# Patient Record
Sex: FEMALE | Race: WHITE | NOT HISPANIC OR LATINO | URBAN - NONMETROPOLITAN AREA
[De-identification: names, ages, dates, MRNs, and addresses within clinical notes are randomized per-mention and may not be internally consistent; named-entity substitution may affect disease eponyms.]

---

## 2018-03-12 ENCOUNTER — HISTORICAL (OUTPATIENT)
Dept: ADMINISTRATIVE | Facility: HOSPITAL | Age: 58
End: 2018-03-12

## 2021-04-08 LAB — CRC RECOMMENDATION EXT: NORMAL

## 2021-04-28 LAB
ALBUMIN SERPL-MCNC: 3.8 G/DL (ref 3.4–5)
ALBUMIN/GLOB SERPL: 1.5 {RATIO}
ALP SERPL-CCNC: 90 U/L (ref 50–144)
ALT SERPL-CCNC: 17 U/L (ref 1–45)
ANION GAP SERPL CALC-SCNC: 9 MMOL/L (ref 7–16)
AST SERPL-CCNC: 18 U/L (ref 14–36)
BASOPHILS # BLD AUTO: 0.04 X10(3)/MCL (ref 0.01–0.08)
BASOPHILS NFR BLD AUTO: 0.6 % (ref 0.1–1.2)
BILIRUB SERPL-MCNC: 0.55 MG/DL (ref 0.1–1)
BUN SERPL-MCNC: 9 MG/DL (ref 7–20)
CALCIUM SERPL-MCNC: 8.9 MG/DL (ref 8.4–10.2)
CHLORIDE SERPL-SCNC: 108 MMOL/L (ref 94–110)
CHOLEST SERPL-MCNC: 194 MG/DL (ref 0–200)
CO2 SERPL-SCNC: 25 MMOL/L (ref 21–32)
CREAT SERPL-MCNC: 0.7 MG/DL (ref 0.52–1.04)
CREAT UR-MCNC: 17.4 MG/DL
CREAT/UREA NIT SERPL: 12.9 (ref 12–20)
EOSINOPHIL # BLD AUTO: 0.18 X10(3)/MCL (ref 0.04–0.36)
EOSINOPHIL NFR BLD AUTO: 2.5 % (ref 0.7–7)
ERYTHROCYTE [DISTWIDTH] IN BLOOD BY AUTOMATED COUNT: 12.7 % (ref 11–14.5)
EST. AVERAGE GLUCOSE BLD GHB EST-MCNC: 138 MG/DL (ref 70–115)
GLOBULIN SER-MCNC: 2.5 G/DL (ref 2–3.9)
GLUCOSE SERPL-MCNC: 124 MGM./DL (ref 70–115)
HBA1C MFR BLD: 6.6 % (ref 4–6)
HCT VFR BLD AUTO: 42.3 % (ref 36–48)
HDLC SERPL-MCNC: 46 MG/DL (ref 40–60)
HGB BLD-MCNC: 13.7 G/DL (ref 11.8–16)
IMM GRANULOCYTES # BLD AUTO: 0.01 X10E3/UL (ref 0–0.03)
IMM GRANULOCYTES NFR BLD AUTO: 0.1 % (ref 0–0.5)
LDLC SERPL CALC-MCNC: 124.1 MG/DL (ref 30–100)
LYMPHOCYTES # BLD AUTO: 1.89 X10(3)/MCL (ref 1.16–3.74)
LYMPHOCYTES NFR BLD AUTO: 26.1 % (ref 20–55)
MCH RBC QN AUTO: 29.4 PG (ref 27–34)
MCHC RBC AUTO-ENTMCNC: 32.4 G/DL (ref 31–37)
MCV RBC AUTO: 90.8 FL (ref 79–99)
MICROALBUMIN/CREAT RATIO PNL UR: <69 (ref 0–29)
MONOCYTES # BLD AUTO: 0.65 X10(3)/MCL (ref 0.24–0.36)
MONOCYTES NFR BLD AUTO: 9 % (ref 4.7–12.5)
NEUTROPHILS # BLD AUTO: 4.47 X10(3)/MCL (ref 1.56–6.13)
NEUTROPHILS NFR BLD AUTO: 61.7 % (ref 37–73)
PLATELET # BLD AUTO: 303 X10(3)/MCL (ref 140–371)
PMV BLD AUTO: 11.3 FL (ref 9.4–12.4)
POTASSIUM SERPL-SCNC: 4.1 MMOL/L (ref 3.5–5.1)
PROT SERPL-MCNC: 6.3 G/DL (ref 6.3–8.2)
RBC # BLD AUTO: 4.66 X10(6)/MCL (ref 4–5.1)
SODIUM SERPL-SCNC: 142 MMOL/L (ref 135–145)
TRIGL SERPL-MCNC: 113 MG/DL (ref 30–200)
TSH SERPL-ACNC: 1.77 UIU/ML (ref 0.36–3.74)
WBC # SPEC AUTO: 7.2 X10(3)/MCL (ref 4–11.5)

## 2021-05-06 ENCOUNTER — HISTORICAL (OUTPATIENT)
Dept: ADMINISTRATIVE | Facility: HOSPITAL | Age: 61
End: 2021-05-06

## 2021-05-06 LAB — BCS RECOMMENDATION EXT: NORMAL

## 2021-08-27 LAB
ALBUMIN SERPL-MCNC: 3.9 G/DL (ref 3.4–5)
ALBUMIN/GLOB SERPL: 1.4 {RATIO}
ALP SERPL-CCNC: 91 U/L (ref 50–144)
ALT SERPL-CCNC: 16 U/L (ref 1–45)
ANION GAP SERPL CALC-SCNC: 7 MMOL/L (ref 7–16)
AST SERPL-CCNC: 23 U/L (ref 14–36)
BILIRUB SERPL-MCNC: 0.64 MG/DL (ref 0.1–1)
BUN SERPL-MCNC: 9 MG/DL (ref 7–20)
CALCIUM SERPL-MCNC: 9.4 MG/DL (ref 8.4–10.2)
CHLORIDE SERPL-SCNC: 106 MMOL/L (ref 94–110)
CHOLEST SERPL-MCNC: 140 MG/DL (ref 0–200)
CO2 SERPL-SCNC: 26 MMOL/L (ref 21–32)
CREAT SERPL-MCNC: 0.7 MG/DL (ref 0.52–1.04)
CREAT/UREA NIT SERPL: 12.9 (ref 12–20)
GLOBULIN SER-MCNC: 2.7 G/DL (ref 2–3.9)
GLUCOSE SERPL-MCNC: 120 MGM./DL (ref 70–115)
HDLC SERPL-MCNC: 40 MG/DL (ref 40–60)
LDLC SERPL CALC-MCNC: 66.6 MG/DL (ref 30–100)
POTASSIUM SERPL-SCNC: 4 MMOL/L (ref 3.5–5.1)
PROT SERPL-MCNC: 6.6 G/DL (ref 6.3–8.2)
SODIUM SERPL-SCNC: 139 MMOL/L (ref 135–145)
TRIGL SERPL-MCNC: 170 MG/DL (ref 30–200)

## 2022-04-12 ENCOUNTER — HISTORICAL (OUTPATIENT)
Dept: ADMINISTRATIVE | Facility: HOSPITAL | Age: 62
End: 2022-04-12

## 2022-04-30 VITALS
DIASTOLIC BLOOD PRESSURE: 60 MMHG | BODY MASS INDEX: 29.54 KG/M2 | HEIGHT: 63 IN | SYSTOLIC BLOOD PRESSURE: 122 MMHG | WEIGHT: 166.69 LBS | OXYGEN SATURATION: 98 %

## 2022-05-04 LAB
AGE: 63
ALBUMIN SERPL-MCNC: 4 G/DL (ref 3.4–5)
ALBUMIN/GLOB SERPL: 1.7 {RATIO}
ALP SERPL-CCNC: 93 U/L (ref 50–144)
ALT SERPL-CCNC: 16 U/L (ref 1–45)
ANION GAP SERPL CALC-SCNC: 7 MMOL/L (ref 2–13)
AST SERPL-CCNC: 20 U/L (ref 14–36)
BASOPHILS # BLD AUTO: 0.06 10*3/UL (ref 0.01–0.08)
BASOPHILS NFR BLD AUTO: 1.1 % (ref 0.1–1.2)
BILIRUB SERPL-MCNC: 0.5 MG/DL (ref 0–1)
BUN SERPL-MCNC: 8 MG/DL (ref 7–20)
CALCIUM SERPL-MCNC: 9.2 MG/DL (ref 8.4–10.2)
CHLORIDE SERPL-SCNC: 106 MMOL/L (ref 98–110)
CHOLEST SERPL-MCNC: 210 MG/DL (ref 0–200)
CO2 SERPL-SCNC: 28 MMOL/L (ref 21–32)
CREAT SERPL-MCNC: 0.69 MG/DL (ref 0.52–1.04)
CREAT/UREA NIT SERPL: 11.6 (ref 12–20)
EOSINOPHIL # BLD AUTO: 0.14 10*3/UL (ref 0.04–0.36)
EOSINOPHIL NFR BLD AUTO: 2.6 % (ref 0.7–7)
ERYTHROCYTE [DISTWIDTH] IN BLOOD BY AUTOMATED COUNT: 12.5 % (ref 11–14.5)
EST. AVERAGE GLUCOSE BLD GHB EST-MCNC: 147 MG/DL (ref 70–115)
GLOBULIN SER-MCNC: 2.4 G/DL (ref 2–3.9)
GLUCOSE SERPL-MCNC: 149 MG/DL (ref 70–115)
HBA1C MFR BLD: 6.9 % (ref 4–6)
HCT VFR BLD AUTO: 41.3 % (ref 36–48)
HDLC SERPL-MCNC: 34 MG/DL (ref 40–60)
HGB BLD-MCNC: 13.8 G/DL (ref 11.8–16)
IMM GRANULOCYTES # BLD AUTO: 0.02 10*3/UL (ref 0–0.03)
IMM GRANULOCYTES NFR BLD AUTO: 0.4 % (ref 0–0.5)
LDLC SERPL CALC-MCNC: 117.4 MG/DL (ref 30–100)
LYMPHOCYTES # BLD AUTO: 1.7 10*3/UL (ref 1.16–3.74)
LYMPHOCYTES NFR BLD AUTO: 31 % (ref 20–55)
MANUAL DIFF? (OHS): NORMAL
MCH RBC QN AUTO: 29.7 PG (ref 27–34)
MCHC RBC AUTO-ENTMCNC: 33.4 G/DL (ref 31–37)
MCV RBC AUTO: 89 FL (ref 79–99)
MONOCYTES # BLD AUTO: 0.51 10*3/UL (ref 0.24–0.36)
MONOCYTES NFR BLD AUTO: 9.3 % (ref 4.7–12.5)
NEUTROPHILS # BLD AUTO: 3.06 10*3/UL (ref 1.56–6.13)
NEUTROPHILS NFR BLD AUTO: 55.6 % (ref 37–73)
PLATELET # BLD AUTO: 292 10*3/UL (ref 140–371)
PMV BLD AUTO: 11.2 FL (ref 9.4–12.4)
POTASSIUM SERPL-SCNC: 4.2 MMOL/L (ref 3.5–5.1)
PROT SERPL-MCNC: 6.4 G/DL (ref 6.3–8.2)
RBC # BLD AUTO: 4.64 10*6/UL (ref 4–5.1)
SODIUM SERPL-SCNC: 141 MMOL/L (ref 135–145)
TRIGL SERPL-MCNC: 291 MG/DL (ref 30–200)
TSH SERPL-ACNC: 3.53 M[IU]/L (ref 0.36–3.74)
WBC # SPEC AUTO: 5.5 10*3/UL (ref 4–11.5)

## 2022-05-09 ENCOUNTER — HISTORICAL (OUTPATIENT)
Dept: ADMINISTRATIVE | Facility: HOSPITAL | Age: 62
End: 2022-05-09

## 2022-06-01 LAB
LEFT EYE DM RETINOPATHY: NEGATIVE
RIGHT EYE DM RETINOPATHY: NEGATIVE

## 2023-01-19 ENCOUNTER — DOCUMENTATION ONLY (OUTPATIENT)
Dept: ADMINISTRATIVE | Facility: HOSPITAL | Age: 63
End: 2023-01-19
Payer: MEDICAID

## 2023-01-20 ENCOUNTER — DOCUMENTATION ONLY (OUTPATIENT)
Dept: ADMINISTRATIVE | Facility: HOSPITAL | Age: 63
End: 2023-01-20
Payer: MEDICAID

## 2023-01-20 ENCOUNTER — DOCUMENTATION ONLY (OUTPATIENT)
Dept: ADMINISTRATIVE | Facility: HOSPITAL | Age: 63
End: 2023-01-20

## 2023-04-03 DIAGNOSIS — F32.A DEPRESSION, UNSPECIFIED DEPRESSION TYPE: Primary | ICD-10-CM

## 2023-04-03 RX ORDER — DULOXETIN HYDROCHLORIDE 60 MG/1
CAPSULE, DELAYED RELEASE ORAL
Qty: 90 CAPSULE | Refills: 3 | Status: SHIPPED | OUTPATIENT
Start: 2023-04-03 | End: 2023-07-03

## 2023-06-13 DIAGNOSIS — I99.8 VASCULAR INSUFFICIENCY: Primary | ICD-10-CM

## 2023-06-13 RX ORDER — CLOPIDOGREL BISULFATE 75 MG/1
TABLET ORAL
Qty: 90 TABLET | Refills: 0 | Status: SHIPPED | OUTPATIENT
Start: 2023-06-13 | End: 2023-07-03 | Stop reason: SDUPTHER

## 2023-06-15 ENCOUNTER — DOCUMENTATION ONLY (OUTPATIENT)
Dept: FAMILY MEDICINE | Facility: CLINIC | Age: 63
End: 2023-06-15

## 2023-06-15 LAB
LEFT EYE DM RETINOPATHY: NEGATIVE
RIGHT EYE DM RETINOPATHY: NEGATIVE

## 2023-06-21 PROCEDURE — 82306 VITAMIN D 25 HYDROXY: CPT | Performed by: NURSE PRACTITIONER

## 2023-06-21 PROCEDURE — 80061 LIPID PANEL: CPT | Performed by: NURSE PRACTITIONER

## 2023-06-21 PROCEDURE — 80053 COMPREHEN METABOLIC PANEL: CPT | Performed by: NURSE PRACTITIONER

## 2023-06-21 PROCEDURE — 83036 HEMOGLOBIN GLYCOSYLATED A1C: CPT | Performed by: NURSE PRACTITIONER

## 2023-06-21 PROCEDURE — 85025 COMPLETE CBC W/AUTO DIFF WBC: CPT | Performed by: NURSE PRACTITIONER

## 2023-06-21 PROCEDURE — 84443 ASSAY THYROID STIM HORMONE: CPT | Performed by: NURSE PRACTITIONER

## 2023-07-03 ENCOUNTER — OFFICE VISIT (OUTPATIENT)
Dept: FAMILY MEDICINE | Facility: CLINIC | Age: 63
End: 2023-07-03
Payer: MEDICARE

## 2023-07-03 VITALS
HEIGHT: 63 IN | BODY MASS INDEX: 27.73 KG/M2 | OXYGEN SATURATION: 98 % | SYSTOLIC BLOOD PRESSURE: 100 MMHG | DIASTOLIC BLOOD PRESSURE: 60 MMHG | WEIGHT: 156.5 LBS | TEMPERATURE: 98 F | HEART RATE: 63 BPM

## 2023-07-03 DIAGNOSIS — I34.0 MITRAL VALVE INSUFFICIENCY, UNSPECIFIED ETIOLOGY: ICD-10-CM

## 2023-07-03 DIAGNOSIS — Z00.00 MEDICARE ANNUAL WELLNESS VISIT, SUBSEQUENT: Primary | ICD-10-CM

## 2023-07-03 DIAGNOSIS — Z12.4 SCREENING FOR MALIGNANT NEOPLASM OF CERVIX: ICD-10-CM

## 2023-07-03 DIAGNOSIS — F17.200 CURRENT SMOKER: ICD-10-CM

## 2023-07-03 DIAGNOSIS — E78.2 MIXED HYPERLIPIDEMIA: ICD-10-CM

## 2023-07-03 DIAGNOSIS — M51.36 DEGENERATIVE DISC DISEASE, LUMBAR: ICD-10-CM

## 2023-07-03 DIAGNOSIS — G47.33 OBSTRUCTIVE SLEEP APNEA SYNDROME: ICD-10-CM

## 2023-07-03 DIAGNOSIS — J44.9 MILD CHRONIC OBSTRUCTIVE PULMONARY DISEASE: ICD-10-CM

## 2023-07-03 DIAGNOSIS — Z12.31 SCREENING MAMMOGRAM FOR BREAST CANCER: ICD-10-CM

## 2023-07-03 DIAGNOSIS — E11.9 TYPE 2 DIABETES MELLITUS WITHOUT COMPLICATION, WITHOUT LONG-TERM CURRENT USE OF INSULIN: ICD-10-CM

## 2023-07-03 DIAGNOSIS — Z91.199 NONCOMPLIANCE: ICD-10-CM

## 2023-07-03 DIAGNOSIS — E55.9 VITAMIN D DEFICIENCY: ICD-10-CM

## 2023-07-03 DIAGNOSIS — E03.9 HYPOTHYROIDISM, UNSPECIFIED TYPE: ICD-10-CM

## 2023-07-03 DIAGNOSIS — F41.1 GENERALIZED ANXIETY DISORDER: ICD-10-CM

## 2023-07-03 PROBLEM — E66.3 OVERWEIGHT WITH BODY MASS INDEX (BMI) 25.0-29.9: Status: ACTIVE | Noted: 2023-07-03

## 2023-07-03 PROBLEM — F32.A DEPRESSIVE DISORDER: Status: ACTIVE | Noted: 2023-07-03

## 2023-07-03 PROBLEM — M54.50 LOW BACK PAIN: Status: ACTIVE | Noted: 2023-07-03

## 2023-07-03 PROBLEM — I87.2 CHRONIC VENOUS INSUFFICIENCY: Status: ACTIVE | Noted: 2018-08-20

## 2023-07-03 PROBLEM — F31.9 BIPOLAR DISORDER: Status: ACTIVE | Noted: 2023-07-03

## 2023-07-03 PROBLEM — M51.369 DEGENERATIVE DISC DISEASE, LUMBAR: Status: ACTIVE | Noted: 2023-07-03

## 2023-07-03 PROBLEM — Z72.0 TOBACCO USER: Status: ACTIVE | Noted: 2023-07-03

## 2023-07-03 PROCEDURE — 99406 PR TOBACCO USE CESSATION INTERMEDIATE 3-10 MINUTES: ICD-10-PCS | Mod: ,,, | Performed by: NURSE PRACTITIONER

## 2023-07-03 PROCEDURE — 3074F PR MOST RECENT SYSTOLIC BLOOD PRESSURE < 130 MM HG: ICD-10-PCS | Mod: ,,, | Performed by: NURSE PRACTITIONER

## 2023-07-03 PROCEDURE — 3078F DIAST BP <80 MM HG: CPT | Mod: ,,, | Performed by: NURSE PRACTITIONER

## 2023-07-03 PROCEDURE — 1160F PR REVIEW ALL MEDS BY PRESCRIBER/CLIN PHARMACIST DOCUMENTED: ICD-10-PCS | Mod: ,,, | Performed by: NURSE PRACTITIONER

## 2023-07-03 PROCEDURE — 1160F RVW MEDS BY RX/DR IN RCRD: CPT | Mod: ,,, | Performed by: NURSE PRACTITIONER

## 2023-07-03 PROCEDURE — 3074F SYST BP LT 130 MM HG: CPT | Mod: ,,, | Performed by: NURSE PRACTITIONER

## 2023-07-03 PROCEDURE — G0439 PR MEDICARE ANNUAL WELLNESS SUBSEQUENT VISIT: ICD-10-PCS | Mod: 25,,, | Performed by: NURSE PRACTITIONER

## 2023-07-03 PROCEDURE — 1159F PR MEDICATION LIST DOCUMENTED IN MEDICAL RECORD: ICD-10-PCS | Mod: ,,, | Performed by: NURSE PRACTITIONER

## 2023-07-03 PROCEDURE — 3008F BODY MASS INDEX DOCD: CPT | Mod: ,,, | Performed by: NURSE PRACTITIONER

## 2023-07-03 PROCEDURE — 1159F MED LIST DOCD IN RCRD: CPT | Mod: ,,, | Performed by: NURSE PRACTITIONER

## 2023-07-03 PROCEDURE — 3078F PR MOST RECENT DIASTOLIC BLOOD PRESSURE < 80 MM HG: ICD-10-PCS | Mod: ,,, | Performed by: NURSE PRACTITIONER

## 2023-07-03 PROCEDURE — 99406 BEHAV CHNG SMOKING 3-10 MIN: CPT | Mod: ,,, | Performed by: NURSE PRACTITIONER

## 2023-07-03 PROCEDURE — G0439 PPPS, SUBSEQ VISIT: HCPCS | Mod: 25,,, | Performed by: NURSE PRACTITIONER

## 2023-07-03 PROCEDURE — 3008F PR BODY MASS INDEX (BMI) DOCUMENTED: ICD-10-PCS | Mod: ,,, | Performed by: NURSE PRACTITIONER

## 2023-07-03 RX ORDER — AZELASTINE 1 MG/ML
1 SPRAY, METERED NASAL 2 TIMES DAILY
COMMUNITY
End: 2023-07-03

## 2023-07-03 RX ORDER — PREGABALIN 150 MG/1
150 CAPSULE ORAL 2 TIMES DAILY
COMMUNITY

## 2023-07-03 RX ORDER — LEVOTHYROXINE SODIUM 88 UG/1
88 TABLET ORAL
Qty: 90 TABLET | Refills: 1 | Status: SHIPPED | OUTPATIENT
Start: 2023-07-03 | End: 2023-12-26

## 2023-07-03 RX ORDER — METFORMIN HYDROCHLORIDE 500 MG/1
1000 TABLET, EXTENDED RELEASE ORAL
COMMUNITY
End: 2023-07-03

## 2023-07-03 RX ORDER — SEMAGLUTIDE 1.34 MG/ML
INJECTION, SOLUTION SUBCUTANEOUS
Qty: 1.5 ML | Refills: 1 | Status: SHIPPED | OUTPATIENT
Start: 2023-07-03 | End: 2023-08-24

## 2023-07-03 RX ORDER — ROSUVASTATIN CALCIUM 40 MG/1
40 TABLET, COATED ORAL DAILY
COMMUNITY
Start: 2023-06-06 | End: 2023-07-03 | Stop reason: SDUPTHER

## 2023-07-03 RX ORDER — FLUTICASONE PROPIONATE 50 UG/1
1 POWDER, METERED RESPIRATORY (INHALATION) 2 TIMES DAILY
COMMUNITY
End: 2023-07-03

## 2023-07-03 RX ORDER — CLOPIDOGREL BISULFATE 75 MG/1
75 TABLET ORAL DAILY
Qty: 90 TABLET | Refills: 3 | Status: SHIPPED | OUTPATIENT
Start: 2023-07-03

## 2023-07-03 RX ORDER — CARIPRAZINE 1.5 MG/1
1 CAPSULE, GELATIN COATED ORAL DAILY
COMMUNITY
Start: 2022-07-12 | End: 2023-07-03

## 2023-07-03 RX ORDER — BUSPIRONE HYDROCHLORIDE 5 MG/1
5 TABLET ORAL DAILY
Qty: 90 TABLET | Refills: 3 | Status: SHIPPED | OUTPATIENT
Start: 2023-07-03

## 2023-07-03 RX ORDER — FAMOTIDINE 20 MG/1
20 TABLET, FILM COATED ORAL 2 TIMES DAILY
COMMUNITY
End: 2023-07-03

## 2023-07-03 RX ORDER — MONTELUKAST SODIUM 10 MG/1
10 TABLET ORAL NIGHTLY
COMMUNITY
End: 2023-07-03

## 2023-07-03 RX ORDER — ROSUVASTATIN CALCIUM 40 MG/1
40 TABLET, COATED ORAL DAILY
Qty: 90 TABLET | Refills: 0 | Status: SHIPPED | OUTPATIENT
Start: 2023-07-03

## 2023-07-03 RX ORDER — LEVOTHYROXINE SODIUM 88 UG/1
1 TABLET ORAL DAILY
COMMUNITY
Start: 2023-02-22 | End: 2023-07-03 | Stop reason: SDUPTHER

## 2023-07-03 RX ORDER — ERGOCALCIFEROL 1.25 MG/1
50000 CAPSULE ORAL
Qty: 13 CAPSULE | Refills: 1 | Status: SHIPPED | OUTPATIENT
Start: 2023-07-03

## 2023-07-03 RX ORDER — BUSPIRONE HYDROCHLORIDE 5 MG/1
5 TABLET ORAL 2 TIMES DAILY
COMMUNITY
End: 2023-07-03 | Stop reason: SDUPTHER

## 2023-07-03 RX ORDER — ASPIRIN 81 MG/1
81 TABLET ORAL DAILY
COMMUNITY

## 2023-07-03 NOTE — ASSESSMENT & PLAN NOTE
Diabetes labs:   Lab Results   Component Value Date    HGBA1C 8.1 (H) 06/21/2023      The risks and benefits of medications were discussed with the patient. All questions answered.

## 2023-07-03 NOTE — PROGRESS NOTES
Patient ID: Magda Wahl  : 1960    Chief Complaint: Medicare AWV    History of Present Illness:  The patient is a 62 y.o. White female who presents to clinic for annual wellness visit.    Diet and nutrition:  Diet is high in salt, high in fat, low in fiber, high caloric intake, high carbohydrate meals, high calcium intake.    Fracture risk: No history of fracture, no recent unexplained fracture.    Physical activity:  Does not exercise on a regular basis, good physical condition.    Depression risks:  + history of depression, never feel sad, empty, or tearful, no sleep disturbances, no agitation, no loss of energy, no feelings of worthlessness or guilt, no thoughts of suicide.    Orientation:  No disorientation to time, no disorientation to place.    Concentration and memory:  No decreased concentration ability, no memory lapses or loss, does not forget words.    Speech forward/motor difficulties: No speech difficulties, no difficulty expressing formulated concepts, no difficulty with fine manipulative tasks, no difficulty writing forward/copying, no slowed reaction time, does not knock things over when trying to pick them up.    Fall risk assessment:  No frequent falls while walking, no fall in the past year, no dizziness forward/vertigo, no fear of falling.  Hearing:  No loss of hearing, does not wear hearing aids.    Vision:  No vision problems, does wear glasses.    Activity of daily living: Able to bathe with limited or no assistance, able to control urination and bowels, able to dress with limited or no assistance, able to feed self with limited or no assistance, able to get out of chair or bed with limited or no assistance, able to [place] with limited or no assistance, able to toilet with limited are no assistance.    Activities of daily living:  Able to do housework with limited or no assistance, able to grocery shop with limited or no assistance, able to manage medications with limited or no  assistance, able to manage money with limited or no assistance, able to prepare meals with limited or no assistance, able to use the phone with limited or no assistance.    Screenings: not due for vaccinations, due for breast cancer screening, due for cervical cancer screening, not due for colorectal cancer screening.    Patient helps care for disabled adult son as well as 90 yr old mother       Allergies: Patient is allergic to fluoxetine, codeine, and olanzapine.     Past Medical History:  has no past medical history on file.    Surgical History:  has a past surgical history that includes Colonoscopy.    Family History: family history is not on file.    Social History:  reports that she has been smoking cigarettes. She has a 15.00 pack-year smoking history. She has been exposed to tobacco smoke. She has never used smokeless tobacco.    Care Team: Patient Care Team:  SOLIS Huber as PCP - General (Family Medicine)  Manas Medrano MD as Consulting Physician (Cardiology)     Current Medications:  Current Outpatient Medications   Medication Instructions    aspirin (ECOTRIN) 81 mg, Oral, Daily    busPIRone (BUSPAR) 5 mg, Oral, Daily    clopidogreL (PLAVIX) 75 mg, Oral, Daily    ergocalciferol (ERGOCALCIFEROL) 50,000 Units, Oral, Every 7 days    levothyroxine (SYNTHROID) 88 mcg, Oral, Before breakfast    pregabalin (LYRICA) 150 mg, Oral, 2 times daily    rosuvastatin (CRESTOR) 40 mg, Oral, Daily    semaglutide (OZEMPIC) 0.25 mg or 0.5 mg(2 mg/1.5 mL) pen injector Inject 0.25 mg into the skin every 7 days for 28 days, THEN 0.5 mg every 7 days. Start with 0.25 mg dose once weekly x 1 month, then increase to 0.5 mg dose once weekly thereafter.       Opioid Screening: Patient medication list reviewed, patient is taking prescription opioids. Patient is not using additional opioids than prescribed. Patient is at low risk of substance abuse based on this opioid use history.     Patient Reported Health Risk  Assessment  Are you male or female?: Female  During the past four weeks, how much have you been bothered by emotional problems such as feeling anxious, depressed, irritable, sad, or downhearted and blue?: Moderately  During the past five weeks, has your physical and/or emotional health limited your social activities with family, friends, neighbors, or groups?: Not at all  During the past four weeks, how much bodily pain have you generally had?: Moderate pain  During the past four weeks, was someone available to help if you needed and wanted help?: Yes, as much as I wanted  During the past four weeks, what was the hardest physical activity you could do for at least two minutes?: Heavy  Can you get to places out of walking distance without help?  (For example, can you travel alone on buses or taxis, or drive your own car?): Yes  Can you go shopping for groceries or clothes without someone's help?: Yes  Can you prepare your own meals?: Yes  Can you do your own housework without help?: Yes  Because of any health problems, do you need the help of another person with your personal care needs such as eating, bathing, dressing, or getting around the house?: No  Can you handle your own money without help?: Yes  During the past four weeks, how would you rate your health in general?: Good  How have things been going for you during the past four weeks?: Good and bad parts about equal  Are you having difficulties driving your car?: No  Do you always fasten your seat belt when you are in a car?: Yes, usually  How often in the past four weeks have you been bothered by falling or dizzy when standing up?: Never  How often in the past four weeks have you been bothered by sexual problems?: Never  How often in the past four weeks have you been bothered by trouble eating well?: Never  How often in the past four weeks have you been bothered by teeth or denture problems?: Never  How often in the past four weeks have you been bothered with  problems using the telephone?: Never  How often in the past four weeks have you been bothered by tiredness or fatigue?: Always  Have you fallen two or more times in the past year?: No  Are you afraid of falling?: Yes  Are you a smoker?: Yes, I'm not ready to quit  During the past four weeks, how many drinks of wine, beer, or other alcoholic beverages did you have?: No alcohol at all  Do you exercise for about 20 minutes three or more days a week?: No, I usually do not exercise this much  Have you been given any information to help you with hazards in your house that might hurt you?: Yes  Have you been given any information to help you with keeping track of your medications?: Yes  How often do you have trouble taking medicines the way you've been told to take them?: I always take them as prescribed  How confident are you that you can control and manage most of your health problems?: Very confident  What is your race? (Check all that apply.):     Review of Systems   Constitutional:  Negative for appetite change, fatigue, fever and unexpected weight change.   HENT:  Negative for nasal congestion, ear pain, facial swelling, hearing loss, mouth sores, nosebleeds, sore throat and trouble swallowing.    Eyes:  Negative for pain, discharge, redness and visual disturbance.   Respiratory:  Negative for cough, chest tightness and shortness of breath.    Cardiovascular:  Negative for chest pain, palpitations and leg swelling.   Gastrointestinal:  Negative for abdominal pain, blood in stool, constipation, diarrhea and nausea.   Endocrine: Negative for cold intolerance, heat intolerance, polydipsia, polyphagia and polyuria.   Genitourinary:  Negative for difficulty urinating, dysuria, frequency and hematuria.   Musculoskeletal:  Positive for arthralgias and myalgias. Negative for joint swelling and joint deformity.   Integumentary:  Negative for color change, rash and mole/lesion.   Neurological:  Negative for dizziness,  "weakness, headaches and memory loss.   Hematological:  Negative for adenopathy. Does not bruise/bleed easily.   Psychiatric/Behavioral:  Negative for confusion, sleep disturbance and suicidal ideas.       Visit Vitals  /60 (BP Location: Right arm, Patient Position: Sitting)   Pulse 63   Temp 97.9 °F (36.6 °C) (Temporal)   Ht 5' 3.39" (1.61 m)   Wt 71 kg (156 lb 8.4 oz)   SpO2 98%   BMI 27.39 kg/m²       Physical Exam  Vitals reviewed.   Constitutional:       General: She is not in acute distress.     Appearance: Normal appearance. She is overweight.   HENT:      Head: Normocephalic and atraumatic.      Right Ear: Tympanic membrane, ear canal and external ear normal.      Left Ear: Tympanic membrane, ear canal and external ear normal.      Nose: Nose normal. No congestion.      Mouth/Throat:      Mouth: Mucous membranes are moist.      Pharynx: Oropharynx is clear. No oropharyngeal exudate or posterior oropharyngeal erythema.   Eyes:      Extraocular Movements: Extraocular movements intact.      Conjunctiva/sclera: Conjunctivae normal.      Pupils: Pupils are equal, round, and reactive to light.   Cardiovascular:      Rate and Rhythm: Normal rate and regular rhythm.      Pulses: Normal pulses.      Heart sounds: Murmur (2/6) heard.   Pulmonary:      Effort: Pulmonary effort is normal.      Breath sounds: Normal breath sounds.   Abdominal:      General: Bowel sounds are normal.      Palpations: Abdomen is soft.      Tenderness: There is no abdominal tenderness.   Musculoskeletal:         General: No swelling, tenderness or deformity. Normal range of motion.      Cervical back: Normal range of motion and neck supple.   Lymphadenopathy:      Cervical: No cervical adenopathy.   Skin:     General: Skin is warm and dry.      Capillary Refill: Capillary refill takes less than 2 seconds.      Coloration: Skin is not jaundiced.      Findings: No rash.   Neurological:      Mental Status: She is alert and oriented to " person, place, and time.      Cranial Nerves: No cranial nerve deficit.   Psychiatric:         Mood and Affect: Mood normal.         Behavior: Behavior normal.        No flowsheet data found.  Fall Risk Assessment - Outpatient 7/3/2023   Mobility Status Ambulatory   Number of falls 0   Identified as fall risk 0           Depression Screening  Over the past two weeks, has the patient felt down, depressed, or hopeless?: Yes  Over the past two weeks, has the patient felt little interest or pleasure in doing things?: No  Functional Ability/Safety Screening  Was the patient's timed Up & Go test unsteady or longer than 30 seconds?: No  Does the patient need help with phone, transportation, shopping, preparing meals, housework, laundry, meds, or managing money?: No  Does the patient's home have rugs in the hallway, lack grab bars in the bathroom, lack handrails on the stairs or have poor lighting?: No  Have you noticed any hearing difficulties?: No  Cognitive Function (Assessed through direct observation with due consideration of information obtained by way of patient reports and/or concerns raised by family, friends, caretakers, or others)    Does the patient repeat questions/statements in the same day?: No  Does the patient have trouble remembering the date, year, and time?: No  Does the patient have difficulty managing finances?: No  Does the patient have a decreased sense of direction?: No    Labs Reviewed:  Chemistry:  Lab Results   Component Value Date     06/21/2023    K 4.4 06/21/2023    CHLORIDE 104 06/21/2023    BUN 10.0 06/21/2023    CREATININE 0.77 06/21/2023    EGFRNORACEVR 86 06/21/2023    GLUCOSE 178 (H) 06/21/2023    CALCIUM 9.2 06/21/2023    ALKPHOS 88 06/21/2023    LABPROT 6.8 06/21/2023    ALBUMIN 4.2 06/21/2023    AST 21 06/21/2023    ALT 16 06/21/2023    [id number] <20.0 (L) 06/21/2023    TSH 1.360 06/21/2023        Lab Results   Component Value Date    HGBA1C 8.1 (H) 06/21/2023         Hematology:  Lab Results   Component Value Date    WBC 5.82 06/21/2023    RBC 4.85 06/21/2023    HGB 14.5 06/21/2023    HCT 42.5 06/21/2023    MCV 87.6 06/21/2023    MCH 29.9 06/21/2023    MCHC 34.1 06/21/2023    RDW 12.4 06/21/2023     06/21/2023    MPV 11.2 06/21/2023       Lipid Panel:  Lab Results   Component Value Date    CHOL 307 (H) 06/21/2023    HDL 45 06/21/2023    DLDL 216.6 (H) 06/21/2023    TRIG 132 06/21/2023        Assessment & Plan:  1. Medicare annual wellness visit, subsequent  Overview:  Cervical Cancer Screening- refer to obclinic  Breast Cancer Screening- ordered mammo  Osteoporosis Screening- not due  Colon Cancer Screening -  4/08/21 colonoscopy WNL, repeat 10 years  Eye Exam- established with Dr. Franklin.   Dental Exam- Recommend biannually.          2. Type 2 diabetes mellitus without complication, without long-term current use of insulin  Overview:  Established with Dr. Franklin for vision.  Last prescribed metformin 1000 daily. Stopped d/t side effects. Begin ozempic    Assessment & Plan:  Diabetes labs:   Lab Results   Component Value Date    HGBA1C 8.1 (H) 06/21/2023      The risks and benefits of medications were discussed with the patient. All questions answered.       Orders:  -     semaglutide (OZEMPIC) 0.25 mg or 0.5 mg(2 mg/1.5 mL) pen injector; Inject 0.25 mg into the skin every 7 days for 28 days, THEN 0.5 mg every 7 days. Start with 0.25 mg dose once weekly x 1 month, then increase to 0.5 mg dose once weekly thereafter.  Dispense: 1.5 mL; Refill: 1    3. Current smoker  Overview:  Down to 1/2 ppd. Declines medication     Assessment & Plan:  Educated patient on need to identify triggers for cigarette smoking and to find an alternative to alleviate these triggers such as walking, eating unsalted sunflower seeds, eating carrots. Advised patient to develop a plan to quit smoking whether it is to decrease by a few cigarettes every 3-5 days or quit cold turkey. Advised  patient to schedule a quit date. Spent 3 minutes discussing smoking cessation with patient. Patient states understanding.         4. Degenerative disc disease, lumbar  Overview:  Fall in 2010 at work, worked up by Dr. Soto and workman's comp.  Deemed disabled.  Completed injections and PT in the past. Did not tolerate gabapentin, now takes lyrica only once daily PRN, filled per Dr. Mendoza.     Assessment & Plan:   last filled 8/15/22, #60, 8/15/22, 9/12/22 and 10/27/22      5. Obstructive sleep apnea syndrome  Overview:  Improved with CPAP      6. Mixed hyperlipidemia  Overview:  Last prescribed rosuvastatin 40 per Dr. Medrano, gaps in fill hx,     Assessment & Plan:  Lipid Panel:  Lab Results   Component Value Date    CHOL 307 (H) 06/21/2023    HDL 45 06/21/2023    DLDL 216.6 (H) 06/21/2023    TRIG 132 06/21/2023    AST 21 06/21/2023    ALT 16 06/21/2023    ALKPHOS 88 06/21/2023    LABPROT 6.8 06/21/2023    ALBUMIN 4.2 06/21/2023        Orders:  -     rosuvastatin (CRESTOR) 40 MG Tab; Take 1 tablet (40 mg total) by mouth Daily.  Dispense: 90 tablet; Refill: 0    7. Noncompliance  Comments:  educated on importance of compliance, 90 day supply medication sent out  Overview:  Gaps in fill history       8. Hypothyroidism, unspecified type  Overview:  Gaps in fill hx but no dose adjustment in over a year. Continue 88mcg.     Assessment & Plan:  Lab Results   Component Value Date    TSH 1.360 06/21/2023         Orders:  -     levothyroxine (SYNTHROID) 88 MCG tablet; Take 1 tablet (88 mcg total) by mouth before breakfast.  Dispense: 90 tablet; Refill: 1    9. Generalized anxiety disorder  Overview:  Only taking buspar 5mg - takes 1/2 tab every morning.    Orders:  -     busPIRone (BUSPAR) 5 MG Tab; Take 1 tablet (5 mg total) by mouth once daily.  Dispense: 90 tablet; Refill: 3    10. Mild chronic obstructive pulmonary disease  Comments:  hasn't filled inhalers in over a year. obtain update pft  Overview:  2018 pft showed  mild restriction. Patient did not do PFT in 2022 as ordered.     Orders:  -     Complete PFT w/ bronchodilator; Future    11. Mitral valve insufficiency, unspecified etiology  Overview:  Taking plavix    Orders:  -     clopidogreL (PLAVIX) 75 mg tablet; Take 1 tablet (75 mg total) by mouth once daily.  Dispense: 90 tablet; Refill: 3    12. Vitamin D deficiency  Assessment & Plan:  Lab Results   Component Value Date    [id number] <20.0 (L) 06/21/2023     Begin weekly vit D    Orders:  -     ergocalciferol (ERGOCALCIFEROL) 50,000 unit Cap; Take 1 capsule (50,000 Units total) by mouth every 7 days.  Dispense: 13 capsule; Refill: 1    13. Screening for malignant neoplasm of cervix  -     Ambulatory referral/consult to Obstetrics / Gynecology; Future; Expected date: 07/10/2023    14. Screening mammogram for breast cancer  -     Mammo Digital Screening Bilat w/ [facility]; Future; Expected date: 07/03/2023       Vaccinations:  Immunization History   Administered Date(s) Administered    Tdap 05/15/2015       Future Appointments   Date Time Provider Department Center   9/5/2023  1:00 PM SOLIS Huber [facility]       Follow up for 1)2 mo f/u DM injection 2) 4 mo f/u DM, chol, fast labs 3)1 yr Medicare Wellness Fasting labs prior. Call sooner if needed.    SOLIS HUBER         Medicare Annual Wellness and Personalized Prevention Plan:   Fall Risk + Home Safety + Hearing Impairment + Depression Screen + Opioid and Substance Abuse Screening + Cognitive Impairment Screen + Health Risk Assessment all reviewed.     Health Maintenance Topics with due status: Not Due       Topic Last Completion Date    TETANUS VACCINE 05/15/2015    Colorectal Cancer Screening 04/08/2021    Lipid Panel 06/21/2023    Hemoglobin A1c 06/21/2023    Low Dose Statin 07/03/2023    Influenza Vaccine Not Due      The patient's Health Maintenance was reviewed and the following appears to be due at this time:   Health Maintenance Due   Topic  Date Due    Hepatitis C Screening  Never done    Cervical Cancer Screening  Never done    COVID-19 Vaccine (1) Never done    Foot Exam  Never done    HIV Screening  Never done    Shingles Vaccine (1 of 2) Never done    Diabetes Urine Screening  04/28/2022    Mammogram  05/06/2022    Eye Exam  06/01/2023       Advance Care Planning   I attest to discussing Advance Care Planning with patient and/or family member.  Education was provided including the importance of the Health Care Power of , Advance Directives, and/or [place] documentation.  The patient expressed understanding to the importance of this information and discussion.

## 2023-07-03 NOTE — ASSESSMENT & PLAN NOTE
Educated patient on need to identify triggers for cigarette smoking and to find an alternative to alleviate these triggers such as walking, eating unsalted sunflower seeds, eating carrots. Advised patient to develop a plan to quit smoking whether it is to decrease by a few cigarettes every 3-5 days or quit cold turkey. Advised patient to schedule a quit date. Spent 3 minutes discussing smoking cessation with patient. Patient states understanding.

## 2023-07-03 NOTE — ASSESSMENT & PLAN NOTE
Lipid Panel:  Lab Results   Component Value Date    CHOL 307 (H) 06/21/2023    HDL 45 06/21/2023    DLDL 216.6 (H) 06/21/2023    TRIG 132 06/21/2023    AST 21 06/21/2023    ALT 16 06/21/2023    ALKPHOS 88 06/21/2023    LABPROT 6.8 06/21/2023    ALBUMIN 4.2 06/21/2023

## 2023-08-24 DIAGNOSIS — E11.9 TYPE 2 DIABETES MELLITUS WITHOUT COMPLICATION, WITHOUT LONG-TERM CURRENT USE OF INSULIN: ICD-10-CM

## 2023-08-24 RX ORDER — SEMAGLUTIDE 0.68 MG/ML
INJECTION, SOLUTION SUBCUTANEOUS
Qty: 3 ML | Refills: 0 | Status: SHIPPED | OUTPATIENT
Start: 2023-08-24 | End: 2023-09-05 | Stop reason: SDUPTHER

## 2023-09-05 ENCOUNTER — OFFICE VISIT (OUTPATIENT)
Dept: FAMILY MEDICINE | Facility: CLINIC | Age: 63
End: 2023-09-05
Payer: MEDICARE

## 2023-09-05 VITALS
HEART RATE: 81 BPM | TEMPERATURE: 99 F | DIASTOLIC BLOOD PRESSURE: 60 MMHG | SYSTOLIC BLOOD PRESSURE: 102 MMHG | WEIGHT: 150.38 LBS | HEIGHT: 63 IN | BODY MASS INDEX: 26.64 KG/M2 | OXYGEN SATURATION: 97 %

## 2023-09-05 DIAGNOSIS — E78.2 MIXED HYPERLIPIDEMIA: ICD-10-CM

## 2023-09-05 DIAGNOSIS — E11.65 TYPE 2 DIABETES MELLITUS WITH HYPERGLYCEMIA, WITHOUT LONG-TERM CURRENT USE OF INSULIN: Primary | ICD-10-CM

## 2023-09-05 PROCEDURE — 1159F MED LIST DOCD IN RCRD: CPT | Mod: ,,, | Performed by: NURSE PRACTITIONER

## 2023-09-05 PROCEDURE — 99214 OFFICE O/P EST MOD 30 MIN: CPT | Mod: ,,, | Performed by: NURSE PRACTITIONER

## 2023-09-05 PROCEDURE — 3008F PR BODY MASS INDEX (BMI) DOCUMENTED: ICD-10-PCS | Mod: ,,, | Performed by: NURSE PRACTITIONER

## 2023-09-05 PROCEDURE — 1159F PR MEDICATION LIST DOCUMENTED IN MEDICAL RECORD: ICD-10-PCS | Mod: ,,, | Performed by: NURSE PRACTITIONER

## 2023-09-05 PROCEDURE — 3078F PR MOST RECENT DIASTOLIC BLOOD PRESSURE < 80 MM HG: ICD-10-PCS | Mod: ,,, | Performed by: NURSE PRACTITIONER

## 2023-09-05 PROCEDURE — 3074F PR MOST RECENT SYSTOLIC BLOOD PRESSURE < 130 MM HG: ICD-10-PCS | Mod: ,,, | Performed by: NURSE PRACTITIONER

## 2023-09-05 PROCEDURE — 3052F HG A1C>EQUAL 8.0%<EQUAL 9.0%: CPT | Mod: ,,, | Performed by: NURSE PRACTITIONER

## 2023-09-05 PROCEDURE — 1101F PT FALLS ASSESS-DOCD LE1/YR: CPT | Mod: ,,, | Performed by: NURSE PRACTITIONER

## 2023-09-05 PROCEDURE — 3078F DIAST BP <80 MM HG: CPT | Mod: ,,, | Performed by: NURSE PRACTITIONER

## 2023-09-05 PROCEDURE — 99214 PR OFFICE/OUTPT VISIT, EST, LEVL IV, 30-39 MIN: ICD-10-PCS | Mod: ,,, | Performed by: NURSE PRACTITIONER

## 2023-09-05 PROCEDURE — 3288F PR FALLS RISK ASSESSMENT DOCUMENTED: ICD-10-PCS | Mod: ,,, | Performed by: NURSE PRACTITIONER

## 2023-09-05 PROCEDURE — 3008F BODY MASS INDEX DOCD: CPT | Mod: ,,, | Performed by: NURSE PRACTITIONER

## 2023-09-05 PROCEDURE — 1160F RVW MEDS BY RX/DR IN RCRD: CPT | Mod: ,,, | Performed by: NURSE PRACTITIONER

## 2023-09-05 PROCEDURE — 1160F PR REVIEW ALL MEDS BY PRESCRIBER/CLIN PHARMACIST DOCUMENTED: ICD-10-PCS | Mod: ,,, | Performed by: NURSE PRACTITIONER

## 2023-09-05 PROCEDURE — 3074F SYST BP LT 130 MM HG: CPT | Mod: ,,, | Performed by: NURSE PRACTITIONER

## 2023-09-05 PROCEDURE — 3052F PR MOST RECENT HEMOGLOBIN A1C LEVEL 8.0 - < 9.0%: ICD-10-PCS | Mod: ,,, | Performed by: NURSE PRACTITIONER

## 2023-09-05 PROCEDURE — 1101F PR PT FALLS ASSESS DOC 0-1 FALLS W/OUT INJ PAST YR: ICD-10-PCS | Mod: ,,, | Performed by: NURSE PRACTITIONER

## 2023-09-05 PROCEDURE — 3288F FALL RISK ASSESSMENT DOCD: CPT | Mod: ,,, | Performed by: NURSE PRACTITIONER

## 2023-09-05 RX ORDER — SEMAGLUTIDE 0.68 MG/ML
0.5 INJECTION, SOLUTION SUBCUTANEOUS
Qty: 3 ML | Refills: 2 | Status: SHIPPED | OUTPATIENT
Start: 2023-09-05 | End: 2023-12-11

## 2023-09-05 RX ORDER — INSULIN PUMP SYRINGE, 3 ML
EACH MISCELLANEOUS
Qty: 1 EACH | Refills: 0 | Status: SHIPPED | OUTPATIENT
Start: 2023-09-05

## 2023-09-05 RX ORDER — LANCETS
EACH MISCELLANEOUS
Qty: 100 EACH | Refills: 3 | Status: SHIPPED | OUTPATIENT
Start: 2023-09-05

## 2023-09-05 NOTE — PROGRESS NOTES
Patient ID: Magda Wahl  : 1960     Chief Complaint: 2 month follow up ozempic     Allergies: Patient is allergic to fluoxetine, codeine, and olanzapine.     History of Present Illness:  The patient is a 63 y.o. White female who presents to clinic for evaluation and management with a chief complaint of 2 month follow up ozempic    Tolerating 0.5mg ozempic, declines increase in dose at this time.     Diabetes  She presents for her follow-up diabetic visit. She has type 2 diabetes mellitus. Her disease course has been improving. Pertinent negatives for diabetes include no blurred vision, no chest pain, no fatigue, no foot paresthesias, no foot ulcerations, no polydipsia, no polyphagia, no polyuria, no visual change, no weakness and no weight loss. Symptoms are improving. Pertinent negatives for diabetic complications include no CVA, peripheral neuropathy or retinopathy. Risk factors for coronary artery disease include diabetes mellitus, dyslipidemia, family history, obesity, tobacco exposure and post-menopausal. Current diabetic treatment includes oral agent (dual therapy).        Past Medical History:  has a past medical history of Bipolar disorder, Chronic venous insufficiency, Degenerative disc disease, lumbar, Depressive disorder, Hypothyroidism, Mild chronic obstructive pulmonary disease, Mitral valve insufficiency, Mixed hyperlipidemia, Obstructive sleep apnea syndrome, Tobacco user, Type 2 diabetes mellitus, and Vitamin D deficiency.    Social History:  reports that she has been smoking cigarettes. She has a 15.0 pack-year smoking history. She has been exposed to tobacco smoke. She has never used smokeless tobacco.    Care Team: Patient Care Team:  Omar Barboza APRN as PCP - General (Family Medicine)  Manas Medrano MD as Consulting Physician (Cardiology)  Buddy Franklin OD as Consulting Physician (Optometry)     Current Medications:  Current Outpatient Medications   Medication  "Instructions    aspirin (ECOTRIN) 81 mg, Oral, Daily    blood sugar diagnostic Strp To check BG 1 times daily for NIDDM, to use with insurance preferred meter    blood-glucose meter kit To check BG 1 times daily, to use with insurance preferred meter    busPIRone (BUSPAR) 5 mg, Oral, Daily    clopidogreL (PLAVIX) 75 mg, Oral, Daily    ergocalciferol (ERGOCALCIFEROL) 50,000 Units, Oral, Every 7 days    lancets Misc To check BG 1 times daily for NIDDM, to use with insurance preferred meter    levothyroxine (SYNTHROID) 88 mcg, Oral, Before breakfast    OZEMPIC 0.5 mg, Subcutaneous, Every 7 days    pregabalin (LYRICA) 150 mg, Oral, 2 times daily    rosuvastatin (CRESTOR) 40 mg, Oral, Daily       Review of Systems   Constitutional:  Negative for fatigue and weight loss.   Eyes:  Negative for blurred vision.   Cardiovascular:  Negative for chest pain.   Endocrine: Negative for polydipsia, polyphagia and polyuria.   Neurological:  Negative for weakness.        Visit Vitals  /60 (BP Location: Right arm, Patient Position: Sitting)   Pulse 81   Temp 99 °F (37.2 °C)   Ht 5' 3.39" (1.61 m)   Wt 68.2 kg (150 lb 6.4 oz)   SpO2 97%   BMI 26.32 kg/m²       Physical Exam  Vitals reviewed.   Constitutional:       General: She is not in acute distress.     Appearance: Normal appearance. She is overweight.   HENT:      Head: Normocephalic and atraumatic.      Nose: Nose normal.      Mouth/Throat:      Mouth: Mucous membranes are moist.      Pharynx: Oropharynx is clear.   Eyes:      Conjunctiva/sclera: Conjunctivae normal.      Comments: Wearing eyeglasses   Cardiovascular:      Rate and Rhythm: Normal rate and regular rhythm.      Pulses: Normal pulses.      Heart sounds: Murmur (2/6) heard.   Pulmonary:      Effort: Pulmonary effort is normal.      Breath sounds: Normal breath sounds.   Abdominal:      General: Bowel sounds are normal.      Palpations: Abdomen is soft.      Tenderness: There is no abdominal tenderness. "   Musculoskeletal:         General: No swelling. Normal range of motion.      Cervical back: Normal range of motion and neck supple.   Lymphadenopathy:      Cervical: No cervical adenopathy.   Skin:     General: Skin is warm and dry.      Coloration: Skin is not jaundiced.      Findings: No rash.   Neurological:      Mental Status: She is alert and oriented to person, place, and time.      Cranial Nerves: No cranial nerve deficit.   Psychiatric:         Mood and Affect: Mood normal.         Behavior: Behavior normal.          Labs Reviewed:  Chemistry:  Lab Results   Component Value Date     06/21/2023    K 4.4 06/21/2023    CHLORIDE 104 06/21/2023    BUN 10.0 06/21/2023    CREATININE 0.77 06/21/2023    EGFRNORACEVR 86 06/21/2023    GLUCOSE 178 (H) 06/21/2023    CALCIUM 9.2 06/21/2023    ALKPHOS 88 06/21/2023    LABPROT 6.8 06/21/2023    ALBUMIN 4.2 06/21/2023    AST 21 06/21/2023    ALT 16 06/21/2023    [id number] <20.0 (L) 06/21/2023    TSH 1.360 06/21/2023        Lab Results   Component Value Date    HGBA1C 8.1 (H) 06/21/2023        Hematology:  Lab Results   Component Value Date    WBC 5.82 06/21/2023    RBC 4.85 06/21/2023    HGB 14.5 06/21/2023    HCT 42.5 06/21/2023    MCV 87.6 06/21/2023    MCH 29.9 06/21/2023    MCHC 34.1 06/21/2023    RDW 12.4 06/21/2023     06/21/2023    MPV 11.2 06/21/2023       Lipid Panel:  Lab Results   Component Value Date    CHOL 307 (H) 06/21/2023    HDL 45 06/21/2023    DLDL 216.6 (H) 06/21/2023    TRIG 132 06/21/2023        Assessment & Plan:  1. Type 2 diabetes mellitus with hyperglycemia, without long-term current use of insulin  Overview:  Established with Dr. Franklin for vision.  Last prescribed metformin 1000 daily. Stopped d/t side effects. Begin ozempic    Orders:  -     Comprehensive Metabolic Panel; Future; Expected date: 10/05/2023  -     Hemoglobin A1C; Future; Expected date: 10/05/2023  -     semaglutide (OZEMPIC) 0.25 mg or 0.5 mg (2 mg/3 mL) pen  injector; Inject 0.5 mg into the skin every 7 days.  Dispense: 3 mL; Refill: 2  -     blood-glucose meter kit; To check BG 1 times daily, to use with insurance preferred meter  Dispense: 1 each; Refill: 0  -     lancets Misc; To check BG 1 times daily for NIDDM, to use with insurance preferred meter  Dispense: 100 each; Refill: 3  -     blood sugar diagnostic Strp; To check BG 1 times daily for NIDDM, to use with insurance preferred meter  Dispense: 100 each; Refill: 3    2. Mixed hyperlipidemia  Overview:  Last prescribed rosuvastatin 40 per Dr. Medrano, gaps in fill hx,     Orders:  -     Comprehensive Metabolic Panel; Future; Expected date: 10/05/2023  -     Lipid Panel; Future; Expected date: 10/05/2023         No future appointments.      Follow up for 1) 1 month dm, chol, fasting labs prior 2) after 7/4/24 Wellness, fasting labs prior. Call sooner if needed.    SOLIS SANCHEZ    Lab Frequency Next Occurrence   Ambulatory referral/consult to Obstetrics / Gynecology Once 07/10/2023   Mammo Digital Screening Bilat w/ Medhat Once 07/03/2023

## 2023-10-02 PROBLEM — Z00.00 MEDICARE ANNUAL WELLNESS VISIT, SUBSEQUENT: Status: RESOLVED | Noted: 2023-07-03 | Resolved: 2023-10-02

## 2023-10-06 ENCOUNTER — LAB VISIT (OUTPATIENT)
Dept: LAB | Facility: HOSPITAL | Age: 63
End: 2023-10-06
Attending: NURSE PRACTITIONER
Payer: MEDICARE

## 2023-10-06 DIAGNOSIS — E11.65 TYPE 2 DIABETES MELLITUS WITH HYPERGLYCEMIA, WITHOUT LONG-TERM CURRENT USE OF INSULIN: ICD-10-CM

## 2023-10-06 DIAGNOSIS — E78.2 MIXED HYPERLIPIDEMIA: ICD-10-CM

## 2023-10-06 LAB
ALBUMIN SERPL-MCNC: 4.3 G/DL (ref 3.4–5)
ALBUMIN/GLOB SERPL: 1.7 RATIO
ALP SERPL-CCNC: 91 UNIT/L (ref 50–144)
ALT SERPL-CCNC: 15 UNIT/L (ref 1–45)
ANION GAP SERPL CALC-SCNC: 9 MEQ/L (ref 2–13)
AST SERPL-CCNC: 21 UNIT/L (ref 14–36)
BILIRUB SERPL-MCNC: 0.6 MG/DL (ref 0–1)
BUN SERPL-MCNC: 12 MG/DL (ref 7–20)
CALCIUM SERPL-MCNC: 9.2 MG/DL (ref 8.4–10.2)
CHLORIDE SERPL-SCNC: 105 MMOL/L (ref 98–110)
CHOLEST SERPL-MCNC: 135 MG/DL (ref 0–200)
CO2 SERPL-SCNC: 24 MMOL/L (ref 21–32)
CREAT SERPL-MCNC: 0.9 MG/DL (ref 0.66–1.25)
CREAT/UREA NIT SERPL: 13 (ref 12–20)
EST. AVERAGE GLUCOSE BLD GHB EST-MCNC: 162.8 MG/DL (ref 70–115)
GFR SERPLBLD CREATININE-BSD FMLA CKD-EPI: 71 MLS/MIN/1.73/M2
GLOBULIN SER-MCNC: 2.5 GM/DL (ref 2–3.9)
GLUCOSE SERPL-MCNC: 123 MG/DL (ref 70–115)
HBA1C MFR BLD: 7.3 % (ref 4–6)
HDLC SERPL-MCNC: 45 MG/DL (ref 40–60)
LDLC SERPL DIRECT ASSAY-SCNC: 69.6 MG/DL (ref 30–100)
POTASSIUM SERPL-SCNC: 4.1 MMOL/L (ref 3.5–5.1)
PROT SERPL-MCNC: 6.8 GM/DL (ref 6.3–8.2)
SODIUM SERPL-SCNC: 138 MMOL/L (ref 135–145)
TRIGL SERPL-MCNC: 124 MG/DL (ref 30–200)

## 2023-10-06 PROCEDURE — 80061 LIPID PANEL: CPT

## 2023-10-06 PROCEDURE — 36415 COLL VENOUS BLD VENIPUNCTURE: CPT

## 2023-10-06 PROCEDURE — 83036 HEMOGLOBIN GLYCOSYLATED A1C: CPT

## 2023-10-06 PROCEDURE — 80053 COMPREHEN METABOLIC PANEL: CPT

## 2023-12-11 DIAGNOSIS — E11.65 TYPE 2 DIABETES MELLITUS WITH HYPERGLYCEMIA, WITHOUT LONG-TERM CURRENT USE OF INSULIN: ICD-10-CM

## 2023-12-11 RX ORDER — SEMAGLUTIDE 0.68 MG/ML
INJECTION, SOLUTION SUBCUTANEOUS
Qty: 3 ML | Refills: 2 | Status: SHIPPED | OUTPATIENT
Start: 2023-12-11 | End: 2023-12-12 | Stop reason: SDUPTHER

## 2023-12-12 ENCOUNTER — OFFICE VISIT (OUTPATIENT)
Dept: FAMILY MEDICINE | Facility: CLINIC | Age: 63
End: 2023-12-12
Payer: MEDICARE

## 2023-12-12 VITALS
BODY MASS INDEX: 24.48 KG/M2 | HEART RATE: 79 BPM | HEIGHT: 63 IN | OXYGEN SATURATION: 97 % | SYSTOLIC BLOOD PRESSURE: 124 MMHG | TEMPERATURE: 98 F | WEIGHT: 138.19 LBS | DIASTOLIC BLOOD PRESSURE: 68 MMHG

## 2023-12-12 DIAGNOSIS — E78.49 OTHER HYPERLIPIDEMIA: ICD-10-CM

## 2023-12-12 DIAGNOSIS — E11.65 TYPE 2 DIABETES MELLITUS WITH HYPERGLYCEMIA, WITHOUT LONG-TERM CURRENT USE OF INSULIN: Primary | ICD-10-CM

## 2023-12-12 DIAGNOSIS — Z28.21 INFLUENZA VACCINATION DECLINED BY PATIENT: ICD-10-CM

## 2023-12-12 DIAGNOSIS — Z28.21 PNEUMOCOCCAL VACCINATION DECLINED BY PATIENT: ICD-10-CM

## 2023-12-12 PROCEDURE — 3008F PR BODY MASS INDEX (BMI) DOCUMENTED: ICD-10-PCS | Mod: ,,, | Performed by: NURSE PRACTITIONER

## 2023-12-12 PROCEDURE — 1160F RVW MEDS BY RX/DR IN RCRD: CPT | Mod: ,,, | Performed by: NURSE PRACTITIONER

## 2023-12-12 PROCEDURE — 1159F PR MEDICATION LIST DOCUMENTED IN MEDICAL RECORD: ICD-10-PCS | Mod: ,,, | Performed by: NURSE PRACTITIONER

## 2023-12-12 PROCEDURE — 1159F MED LIST DOCD IN RCRD: CPT | Mod: ,,, | Performed by: NURSE PRACTITIONER

## 2023-12-12 PROCEDURE — 3074F SYST BP LT 130 MM HG: CPT | Mod: ,,, | Performed by: NURSE PRACTITIONER

## 2023-12-12 PROCEDURE — 3078F DIAST BP <80 MM HG: CPT | Mod: ,,, | Performed by: NURSE PRACTITIONER

## 2023-12-12 PROCEDURE — 3051F PR MOST RECENT HEMOGLOBIN A1C LEVEL 7.0 - < 8.0%: ICD-10-PCS | Mod: ,,, | Performed by: NURSE PRACTITIONER

## 2023-12-12 PROCEDURE — 99214 PR OFFICE/OUTPT VISIT, EST, LEVL IV, 30-39 MIN: ICD-10-PCS | Mod: ,,, | Performed by: NURSE PRACTITIONER

## 2023-12-12 PROCEDURE — 3008F BODY MASS INDEX DOCD: CPT | Mod: ,,, | Performed by: NURSE PRACTITIONER

## 2023-12-12 PROCEDURE — 1160F PR REVIEW ALL MEDS BY PRESCRIBER/CLIN PHARMACIST DOCUMENTED: ICD-10-PCS | Mod: ,,, | Performed by: NURSE PRACTITIONER

## 2023-12-12 PROCEDURE — 3078F PR MOST RECENT DIASTOLIC BLOOD PRESSURE < 80 MM HG: ICD-10-PCS | Mod: ,,, | Performed by: NURSE PRACTITIONER

## 2023-12-12 PROCEDURE — 99214 OFFICE O/P EST MOD 30 MIN: CPT | Mod: ,,, | Performed by: NURSE PRACTITIONER

## 2023-12-12 PROCEDURE — 3051F HG A1C>EQUAL 7.0%<8.0%: CPT | Mod: ,,, | Performed by: NURSE PRACTITIONER

## 2023-12-12 PROCEDURE — 82043 UR ALBUMIN QUANTITATIVE: CPT | Performed by: NURSE PRACTITIONER

## 2023-12-12 PROCEDURE — 3074F PR MOST RECENT SYSTOLIC BLOOD PRESSURE < 130 MM HG: ICD-10-PCS | Mod: ,,, | Performed by: NURSE PRACTITIONER

## 2023-12-12 RX ORDER — SEMAGLUTIDE 0.68 MG/ML
0.5 INJECTION, SOLUTION SUBCUTANEOUS
Qty: 9 ML | Refills: 3 | Status: SHIPPED | OUTPATIENT
Start: 2023-12-12 | End: 2024-03-06

## 2023-12-12 NOTE — PROGRESS NOTES
Patient ID: Magda Wahl  : 1960     Chief Complaint: Diabetes (Follow up)    Allergies: Patient is allergic to fluoxetine, codeine, and olanzapine.     History of Present Illness:  The patient is a 63 y.o. White female who presents to clinic for evaluation and management with a chief complaint of Diabetes (Follow up)   Tolerating ozempic    Diabetes  She presents for her follow-up diabetic visit. She has type 2 diabetes mellitus. Her disease course has been improving. Pertinent negatives for diabetes include no blurred vision, no chest pain, no fatigue, no foot paresthesias, no foot ulcerations, no polydipsia, no polyphagia, no polyuria, no visual change, no weakness and no weight loss. Symptoms are improving. Pertinent negatives for diabetic complications include no CVA, peripheral neuropathy or retinopathy. Risk factors for coronary artery disease include diabetes mellitus, dyslipidemia, family history, obesity, tobacco exposure and post-menopausal. Current diabetic treatment includes oral agent (dual therapy).        Past Medical History:  has a past medical history of Bipolar disorder, Chronic venous insufficiency, Degenerative disc disease, lumbar, Depressive disorder, Hypothyroidism, Mild chronic obstructive pulmonary disease, Mitral valve insufficiency, Mixed hyperlipidemia, Obstructive sleep apnea syndrome, Tobacco user, Type 2 diabetes mellitus, and Vitamin D deficiency.    Social History:  reports that she has been smoking cigarettes. She has a 15.0 pack-year smoking history. She has been exposed to tobacco smoke. She has never used smokeless tobacco.    Care Team: Patient Care Team:  Omar Barboza APRN as PCP - General (Family Medicine)  Manas Medrano MD as Consulting Physician (Cardiology)  Buddy Franklin OD as Consulting Physician (Optometry)     Current Medications:  Current Outpatient Medications   Medication Instructions    aspirin (ECOTRIN) 81 mg, Oral, Daily    blood sugar  "diagnostic Strp To check BG 1 times daily for NIDDM, to use with insurance preferred meter    blood-glucose meter kit To check BG 1 times daily, to use with insurance preferred meter    busPIRone (BUSPAR) 5 mg, Oral, Daily    clopidogreL (PLAVIX) 75 mg, Oral, Daily    ergocalciferol (ERGOCALCIFEROL) 50,000 Units, Oral, Every 7 days    lancets Misc To check BG 1 times daily for NIDDM, to use with insurance preferred meter    levothyroxine (SYNTHROID) 88 mcg, Oral, Before breakfast    OZEMPIC 0.5 mg, Subcutaneous, Every 7 days    pregabalin (LYRICA) 150 mg, Oral, 2 times daily    rosuvastatin (CRESTOR) 40 mg, Oral, Daily       Review of Systems   Constitutional:  Negative for fatigue and weight loss.   Eyes:  Negative for blurred vision.   Cardiovascular:  Negative for chest pain.   Endocrine: Negative for polydipsia, polyphagia and polyuria.   Neurological:  Negative for weakness.        Visit Vitals  /68 (BP Location: Right arm, Patient Position: Sitting, BP Method: Medium (Manual))   Pulse 79   Temp 97.5 °F (36.4 °C) (Temporal)   Ht 5' 3.39" (1.61 m)   Wt 62.7 kg (138 lb 3.2 oz)   SpO2 97%   BMI 24.18 kg/m²       Physical Exam  Vitals reviewed.   Constitutional:       General: She is not in acute distress.     Appearance: Normal appearance. She is overweight.   HENT:      Head: Normocephalic and atraumatic.      Nose: Nose normal.      Mouth/Throat:      Mouth: Mucous membranes are moist.      Pharynx: Oropharynx is clear.   Eyes:      Conjunctiva/sclera: Conjunctivae normal.      Comments: Wearing eyeglasses   Cardiovascular:      Rate and Rhythm: Normal rate and regular rhythm.      Pulses: Normal pulses.      Heart sounds: Murmur (2/6) heard.   Pulmonary:      Effort: Pulmonary effort is normal.      Breath sounds: Normal breath sounds.   Abdominal:      General: Bowel sounds are normal.      Palpations: Abdomen is soft.      Tenderness: There is no abdominal tenderness.   Musculoskeletal:         General: " No swelling. Normal range of motion.      Cervical back: Normal range of motion and neck supple.   Lymphadenopathy:      Cervical: No cervical adenopathy.   Skin:     General: Skin is warm and dry.      Coloration: Skin is not jaundiced.      Findings: No rash.   Neurological:      Mental Status: She is alert and oriented to person, place, and time.      Cranial Nerves: No cranial nerve deficit.   Psychiatric:         Mood and Affect: Mood normal.         Behavior: Behavior normal.          Labs Reviewed:  Chemistry:  Lab Results   Component Value Date     10/06/2023    K 4.1 10/06/2023    CHLORIDE 105 10/06/2023    BUN 12.0 10/06/2023    CREATININE 0.90 10/06/2023    EGFRNORACEVR 71 10/06/2023    GLUCOSE 123 (H) 10/06/2023    CALCIUM 9.2 10/06/2023    ALKPHOS 91 10/06/2023    LABPROT 6.8 10/06/2023    ALBUMIN 4.3 10/06/2023    AST 21 10/06/2023    ALT 15 10/06/2023    [id number] <20.0 (L) 06/21/2023    TSH 1.360 06/21/2023        Lab Results   Component Value Date    HGBA1C 7.3 (H) 10/06/2023        Hematology:  Lab Results   Component Value Date    WBC 5.82 06/21/2023    RBC 4.85 06/21/2023    HGB 14.5 06/21/2023    HCT 42.5 06/21/2023    MCV 87.6 06/21/2023    MCH 29.9 06/21/2023    MCHC 34.1 06/21/2023    RDW 12.4 06/21/2023     06/21/2023    MPV 11.2 06/21/2023       Lipid Panel:  Lab Results   Component Value Date    CHOL 135 10/06/2023    HDL 45 10/06/2023    DLDL 69.6 10/06/2023    TRIG 124 10/06/2023        Assessment & Plan:  1. Type 2 diabetes mellitus with hyperglycemia, without long-term current use of insulin  Overview:  Established with Dr. Franklin for vision.  Last prescribed metformin 1000 daily. Stopped d/t side effects. Improved with ozempic    Assessment & Plan:  Diabetes labs:   Lab Results   Component Value Date    HGBA1C 7.3 (H) 10/06/2023          Orders:  -     semaglutide (OZEMPIC) 0.25 mg or 0.5 mg (2 mg/3 mL) pen injector; Inject 0.5 mg into the skin every 7 days.   Dispense: 9 mL; Refill: 3  -     Microalbumin/Creatinine Ratio, Urine    2. Other hyperlipidemia  Overview:  Improved with rosuvastatin 40mg    Assessment & Plan:  Lipid Panel:  Lab Results   Component Value Date    CHOL 135 10/06/2023    HDL 45 10/06/2023    DLDL 69.6 10/06/2023    TRIG 124 10/06/2023    AST 21 10/06/2023    ALT 15 10/06/2023    ALKPHOS 91 10/06/2023    LABPROT 6.8 10/06/2023    ALBUMIN 4.3 10/06/2023            3. Influenza vaccination declined by patient    4. Pneumococcal vaccination declined by patient         No future appointments.    Follow up for 1) March f/u dm, nonfast labs prior 2) after 7/3/24, Medicare Wellness, fasting labs prior. Call sooner if needed.    SOLIS SANCHEZ    Lab Frequency Next Occurrence   Ambulatory referral/consult to Obstetrics / Gynecology Once 07/10/2023   Mammo Digital Screening Bilat w/ Medhat Once 07/03/2023   Hemoglobin A1C Once 03/12/2024

## 2023-12-12 NOTE — ASSESSMENT & PLAN NOTE
Lipid Panel:  Lab Results   Component Value Date    CHOL 135 10/06/2023    HDL 45 10/06/2023    DLDL 69.6 10/06/2023    TRIG 124 10/06/2023    AST 21 10/06/2023    ALT 15 10/06/2023    ALKPHOS 91 10/06/2023    LABPROT 6.8 10/06/2023    ALBUMIN 4.3 10/06/2023

## 2023-12-13 ENCOUNTER — PATIENT OUTREACH (OUTPATIENT)
Dept: ADMINISTRATIVE | Facility: HOSPITAL | Age: 63
End: 2023-12-13
Payer: MEDICARE

## 2023-12-13 LAB
CREAT UR-MCNC: 75.5 MG/DL (ref 45–106)
MICROALBUMIN UR-MCNC: 6.3 UG/ML
MICROALBUMIN/CREAT RATIO PNL UR: 8.3 MG/GM CR (ref 0–30)

## 2023-12-13 NOTE — PROGRESS NOTES
Population Health Chart Review & Patient Outreach Details    Health Maintenance Topics Addressed and Outreach Outcomes / Actions Taken:            Diabetic Eye Exam []  Eye Exam Screening Order Placed    []  Eye Camera Scheduled or Optometry/Ophthalmology Referral Placed    [x]  External Records Requested & Care Team Updated if Applicable    []  External Records Uploaded, Care Team Updated, & History Updated if Applicable    []  Patient Declined Scheduling Eye Exam    []  Patient Will Schedule with External Provider & Care Team Updated if Applicable            Abi Yadav MA - Panel Care Coordinator

## 2023-12-13 NOTE — LETTER
AUTHORIZATION FOR RELEASE OF   CONFIDENTIAL INFORMATION    Dear [facility],    We are seeing Magda Wahl, date of birth 1960, in the clinic at [facility]. Omar Barboza APRN is the patient's PCP. Magda Wahl has an outstanding lab/procedure at the time we reviewed her chart. In order to help keep her health information updated, she has authorized us to request the following medical record(s):        (  )  MAMMOGRAM                                      (  )  COLONOSCOPY      (  )  PAP SMEAR                                          (  )  OUTSIDE LAB RESULTS     (  )  DEXA SCAN                                          ( x ) DIABETIC EYE EXAM            (  )  FOOT EXAM                                          (  )  ENTIRE RECORD     (  )  OUTSIDE IMMUNIZATIONS                 (  )  _______________         Please fax records to [facility], Byrne, Laci, APRN, [phone].            Patient Name: Magda Wahl  : 1960  Patient Phone #: [phone]

## 2023-12-26 DIAGNOSIS — E03.9 HYPOTHYROIDISM, UNSPECIFIED TYPE: ICD-10-CM

## 2023-12-26 RX ORDER — LEVOTHYROXINE SODIUM 88 UG/1
88 TABLET ORAL EVERY MORNING
Qty: 90 TABLET | Refills: 0 | Status: SHIPPED | OUTPATIENT
Start: 2023-12-26 | End: 2024-03-22

## 2024-03-05 PROCEDURE — 83036 HEMOGLOBIN GLYCOSYLATED A1C: CPT | Performed by: NURSE PRACTITIONER

## 2024-03-06 DIAGNOSIS — E11.65 TYPE 2 DIABETES MELLITUS WITH HYPERGLYCEMIA, WITHOUT LONG-TERM CURRENT USE OF INSULIN: ICD-10-CM

## 2024-03-06 RX ORDER — SEMAGLUTIDE 0.68 MG/ML
INJECTION, SOLUTION SUBCUTANEOUS
Qty: 3 ML | Refills: 0 | Status: SHIPPED | OUTPATIENT
Start: 2024-03-06 | End: 2024-03-11 | Stop reason: SDUPTHER

## 2024-03-11 ENCOUNTER — PATIENT OUTREACH (OUTPATIENT)
Dept: ADMINISTRATIVE | Facility: HOSPITAL | Age: 64
End: 2024-03-11
Payer: MEDICARE

## 2024-03-11 ENCOUNTER — OFFICE VISIT (OUTPATIENT)
Dept: FAMILY MEDICINE | Facility: CLINIC | Age: 64
End: 2024-03-11
Payer: MEDICARE

## 2024-03-11 VITALS
WEIGHT: 126.81 LBS | HEIGHT: 63 IN | HEART RATE: 84 BPM | OXYGEN SATURATION: 98 % | TEMPERATURE: 97 F | SYSTOLIC BLOOD PRESSURE: 116 MMHG | DIASTOLIC BLOOD PRESSURE: 68 MMHG | BODY MASS INDEX: 22.47 KG/M2

## 2024-03-11 DIAGNOSIS — E55.9 VITAMIN D DEFICIENCY: ICD-10-CM

## 2024-03-11 DIAGNOSIS — E03.9 ACQUIRED HYPOTHYROIDISM: ICD-10-CM

## 2024-03-11 DIAGNOSIS — Z00.00 ENCOUNTER FOR MEDICARE ANNUAL WELLNESS EXAM: ICD-10-CM

## 2024-03-11 DIAGNOSIS — E78.49 OTHER HYPERLIPIDEMIA: ICD-10-CM

## 2024-03-11 DIAGNOSIS — E11.65 TYPE 2 DIABETES MELLITUS WITH HYPERGLYCEMIA, WITHOUT LONG-TERM CURRENT USE OF INSULIN: Primary | ICD-10-CM

## 2024-03-11 PROCEDURE — 3008F BODY MASS INDEX DOCD: CPT | Mod: ,,, | Performed by: NURSE PRACTITIONER

## 2024-03-11 PROCEDURE — 1159F MED LIST DOCD IN RCRD: CPT | Mod: ,,, | Performed by: NURSE PRACTITIONER

## 2024-03-11 PROCEDURE — 3074F SYST BP LT 130 MM HG: CPT | Mod: ,,, | Performed by: NURSE PRACTITIONER

## 2024-03-11 PROCEDURE — 3044F HG A1C LEVEL LT 7.0%: CPT | Mod: ,,, | Performed by: NURSE PRACTITIONER

## 2024-03-11 PROCEDURE — 1160F RVW MEDS BY RX/DR IN RCRD: CPT | Mod: ,,, | Performed by: NURSE PRACTITIONER

## 2024-03-11 PROCEDURE — 3078F DIAST BP <80 MM HG: CPT | Mod: ,,, | Performed by: NURSE PRACTITIONER

## 2024-03-11 PROCEDURE — 99213 OFFICE O/P EST LOW 20 MIN: CPT | Mod: ,,, | Performed by: NURSE PRACTITIONER

## 2024-03-11 RX ORDER — SEMAGLUTIDE 0.68 MG/ML
0.5 INJECTION, SOLUTION SUBCUTANEOUS
Qty: 3 ML | Refills: 11 | Status: SHIPPED | OUTPATIENT
Start: 2024-03-11 | End: 2024-05-29

## 2024-03-11 NOTE — LETTER
AUTHORIZATION FOR RELEASE OF   CONFIDENTIAL INFORMATION    Dear [facility],    Fax: [phone]    We are seeing Magda Wahl, date of birth 1960, in the clinic at [facility]. Omar Barboza APRN is the patient's PCP. Magda Wahl has an outstanding lab/procedure at the time we reviewed her chart. In order to help keep her health information updated, she has authorized us to request the following medical record(s):        (  )  MAMMOGRAM                                      (  )  COLONOSCOPY      (  )  PAP SMEAR                                          (  )  OUTSIDE LAB RESULTS     (  )  DEXA SCAN                                          ( x ) DIABETIC  EYE EXAM            (  )  FOOT EXAM                                          (  )  ENTIRE RECORD     (  )  OUTSIDE IMMUNIZATIONS                 (  )  _______________         Please fax records to [facility], Byrne, Laci, APRN, [phone].            Patient Name: Magda Wahl  : 1960  Patient Phone #: [phone]

## 2024-03-11 NOTE — PROGRESS NOTES
Population Health Chart Review & Patient Outreach Details      Health Maintenance Topics Overdue:      VBHM Score: 4     Osteoporosis Screening  Eye Exam  Mammogram  Foot Exam    Influenza Vaccine  Pneumonia Vaccine  Shingles/Zoster Vaccine  RSV Vaccine                  Health Maintenance Topic(s) Outreach Outcomes & Actions Taken:    Eye Exam - Outreach Outcomes & Actions Taken  : External Records Requested & Care Team Updated if Applicable

## 2024-03-11 NOTE — PROGRESS NOTES
Patient ID: Magda Wahl  : 1960     Chief Complaint: Diabetes (Follow up ) and Results    Allergies: Patient is allergic to fluoxetine, codeine, and olanzapine.     History of Present Illness:  The patient is a 63 y.o. White female who presents to clinic for evaluation and management with a chief complaint of Diabetes (Follow up ) and Results   Tolerating ozempic    Diabetes  She presents for her follow-up diabetic visit. She has type 2 diabetes mellitus. Her disease course has been improving. Pertinent negatives for diabetes include no blurred vision, no chest pain, no fatigue, no foot paresthesias, no foot ulcerations, no polydipsia, no polyphagia, no polyuria, no visual change, no weakness and no weight loss. Symptoms are improving. Pertinent negatives for diabetic complications include no CVA, peripheral neuropathy or retinopathy. Risk factors for coronary artery disease include diabetes mellitus, dyslipidemia, family history, obesity, tobacco exposure and post-menopausal. Current diabetic treatment includes oral agent (dual therapy).        Past Medical History:  has a past medical history of Bipolar disorder, Chronic venous insufficiency, Degenerative disc disease, lumbar, Depressive disorder, Hypothyroidism, Mild chronic obstructive pulmonary disease, Mitral valve insufficiency, Mixed hyperlipidemia, Obstructive sleep apnea syndrome, Tobacco user, Type 2 diabetes mellitus, and Vitamin D deficiency.    Social History:  reports that she has been smoking cigarettes. She has a 15.0 pack-year smoking history. She has been exposed to tobacco smoke. She has never used smokeless tobacco.    Care Team: Patient Care Team:  Omar Barboza APRN as PCP - General (Family Medicine)  Manas Medrano MD as Consulting Physician (Cardiology)  Buddy Franklin OD as Consulting Physician (Optometry)     Current Medications:  Current Outpatient Medications   Medication Instructions    aspirin (ECOTRIN) 81 mg,  "Oral, Daily    blood sugar diagnostic Strp To check BG 1 times daily for NIDDM, to use with insurance preferred meter    blood-glucose meter kit To check BG 1 times daily, to use with insurance preferred meter    busPIRone (BUSPAR) 5 mg, Oral, Daily    clopidogreL (PLAVIX) 75 mg, Oral, Daily    ergocalciferol (ERGOCALCIFEROL) 50,000 Units, Oral, Every 7 days    lancets Misc To check BG 1 times daily for NIDDM, to use with insurance preferred meter    levothyroxine (SYNTHROID) 88 mcg, Oral, Every morning    OZEMPIC 0.5 mg, Subcutaneous, Every 7 days    pregabalin (LYRICA) 150 mg, Oral, 2 times daily    rosuvastatin (CRESTOR) 40 mg, Oral, Daily       Review of Systems   Constitutional:  Negative for fatigue and weight loss.   Eyes:  Negative for blurred vision.   Cardiovascular:  Negative for chest pain.   Endocrine: Negative for polydipsia, polyphagia and polyuria.   Neurological:  Negative for weakness.        Visit Vitals  /68 (BP Location: Right arm, Patient Position: Sitting, BP Method: Medium (Manual))   Pulse 84   Temp 97.2 °F (36.2 °C) (Temporal)   Ht 5' 3.39" (1.61 m)   Wt 57.5 kg (126 lb 12.8 oz)   SpO2 98%   BMI 22.19 kg/m²       Physical Exam  Vitals reviewed.   Constitutional:       General: She is not in acute distress.     Appearance: Normal appearance. She is overweight.   HENT:      Head: Normocephalic and atraumatic.      Nose: Nose normal.      Mouth/Throat:      Mouth: Mucous membranes are moist.      Pharynx: Oropharynx is clear.   Eyes:      Conjunctiva/sclera: Conjunctivae normal.      Comments: Wearing eyeglasses   Cardiovascular:      Rate and Rhythm: Normal rate and regular rhythm.      Pulses: Normal pulses.      Heart sounds: Murmur (2/6) heard.   Pulmonary:      Effort: Pulmonary effort is normal.      Breath sounds: Normal breath sounds.   Musculoskeletal:         General: No swelling.   Skin:     General: Skin is warm and dry.      Coloration: Skin is not jaundiced.      Findings: " No rash.   Neurological:      Mental Status: She is alert and oriented to person, place, and time.      Cranial Nerves: No cranial nerve deficit.   Psychiatric:         Mood and Affect: Mood normal.         Behavior: Behavior normal.          Labs Reviewed:  Chemistry:  Lab Results   Component Value Date     10/06/2023    K 4.1 10/06/2023    CHLORIDE 105 10/06/2023    BUN 12.0 10/06/2023    CREATININE 0.90 10/06/2023    EGFRNORACEVR 71 10/06/2023    GLUCOSE 123 (H) 10/06/2023    CALCIUM 9.2 10/06/2023    ALKPHOS 91 10/06/2023    LABPROT 6.8 10/06/2023    ALBUMIN 4.3 10/06/2023    AST 21 10/06/2023    ALT 15 10/06/2023    [id number] <20.0 (L) 06/21/2023    TSH 1.360 06/21/2023        Lab Results   Component Value Date    HGBA1C 5.9 03/05/2024        Hematology:  Lab Results   Component Value Date    WBC 5.82 06/21/2023    RBC 4.85 06/21/2023    HGB 14.5 06/21/2023    HCT 42.5 06/21/2023    MCV 87.6 06/21/2023    MCH 29.9 06/21/2023    MCHC 34.1 06/21/2023    RDW 12.4 06/21/2023     06/21/2023    MPV 11.2 06/21/2023       Lipid Panel:  Lab Results   Component Value Date    CHOL 135 10/06/2023    HDL 45 10/06/2023    DLDL 69.6 10/06/2023    TRIG 124 10/06/2023        Assessment & Plan:  1. Type 2 diabetes mellitus with hyperglycemia, without long-term current use of insulin  Overview:  Established with Dr. Franklin for vision.  Last prescribed metformin 1000 daily. Stopped d/t side effects. Improved with ozempic    Assessment & Plan:  Diabetes labs:   Lab Results   Component Value Date    HGBA1C 5.9 03/05/2024          Orders:  -     semaglutide (OZEMPIC) 0.25 mg or 0.5 mg (2 mg/3 mL) pen injector; Inject 0.5 mg into the skin every 7 days.  Dispense: 3 mL; Refill: 11  -     CBC Auto Differential; Future; Expected date: 06/11/2024  -     Comprehensive Metabolic Panel; Future; Expected date: 06/11/2024  -     Hemoglobin A1C; Future; Expected date: 06/11/2024  -     Microalbumin/Creatinine Ratio, Urine;  Future; Expected date: 06/11/2024           Future Appointments   Date Time Provider Department Center   7/31/2024  8:10 AM LAB, [facility] LABORATORY [facility]   8/6/2024 10:30 AM Omar Barboza APRN [facility]       Follow up if symptoms worsen or fail to improve, for Keep Apt as Scheduled. Call sooner if needed.    SOLIS SANCHEZ    Lab Frequency Next Occurrence   Ambulatory referral/consult to Obstetrics / Gynecology Once 07/10/2023   Mammo Digital Screening Bilat w/ Medhat Once 07/03/2023   CBC Auto Differential Once 06/11/2024   Comprehensive Metabolic Panel Once 06/11/2024   Lipid Panel Once 06/11/2024   TSH Once 06/11/2024   Hemoglobin A1C Once 06/11/2024   Microalbumin/Creatinine Ratio, Urine Once 06/11/2024   Vitamin D Once 06/11/2024

## 2024-03-22 DIAGNOSIS — E03.9 HYPOTHYROIDISM, UNSPECIFIED TYPE: ICD-10-CM

## 2024-03-22 RX ORDER — LEVOTHYROXINE SODIUM 88 UG/1
88 TABLET ORAL EVERY MORNING
Qty: 90 TABLET | Refills: 2 | Status: SHIPPED | OUTPATIENT
Start: 2024-03-22

## 2024-04-22 ENCOUNTER — TELEPHONE (OUTPATIENT)
Dept: FAMILY MEDICINE | Facility: CLINIC | Age: 64
End: 2024-04-22
Payer: MEDICARE

## 2024-05-29 ENCOUNTER — OFFICE VISIT (OUTPATIENT)
Dept: FAMILY MEDICINE | Facility: CLINIC | Age: 64
End: 2024-05-29
Payer: MEDICARE

## 2024-05-29 VITALS
WEIGHT: 128 LBS | BODY MASS INDEX: 22.68 KG/M2 | SYSTOLIC BLOOD PRESSURE: 120 MMHG | OXYGEN SATURATION: 98 % | HEART RATE: 70 BPM | HEIGHT: 63 IN | TEMPERATURE: 98 F | DIASTOLIC BLOOD PRESSURE: 62 MMHG

## 2024-05-29 DIAGNOSIS — F31.9 BIPOLAR AFFECTIVE DISORDER, REMISSION STATUS UNSPECIFIED: ICD-10-CM

## 2024-05-29 DIAGNOSIS — Z13.31 POSITIVE DEPRESSION SCREENING: ICD-10-CM

## 2024-05-29 DIAGNOSIS — M51.36 DEGENERATIVE DISC DISEASE, LUMBAR: ICD-10-CM

## 2024-05-29 DIAGNOSIS — E11.65 TYPE 2 DIABETES MELLITUS WITH HYPERGLYCEMIA, WITHOUT LONG-TERM CURRENT USE OF INSULIN: ICD-10-CM

## 2024-05-29 DIAGNOSIS — F31.30 BIPOLAR AFFECTIVE DISORDER, CURRENT EPISODE DEPRESSED, CURRENT EPISODE SEVERITY UNSPECIFIED: Primary | ICD-10-CM

## 2024-05-29 DIAGNOSIS — F32.2 SEVERE DEPRESSION: ICD-10-CM

## 2024-05-29 DIAGNOSIS — F41.1 GENERALIZED ANXIETY DISORDER: Primary | ICD-10-CM

## 2024-05-29 PROCEDURE — 3044F HG A1C LEVEL LT 7.0%: CPT | Mod: ,,, | Performed by: NURSE PRACTITIONER

## 2024-05-29 PROCEDURE — 99214 OFFICE O/P EST MOD 30 MIN: CPT | Mod: ,,, | Performed by: NURSE PRACTITIONER

## 2024-05-29 PROCEDURE — 3078F DIAST BP <80 MM HG: CPT | Mod: ,,, | Performed by: NURSE PRACTITIONER

## 2024-05-29 PROCEDURE — 3074F SYST BP LT 130 MM HG: CPT | Mod: ,,, | Performed by: NURSE PRACTITIONER

## 2024-05-29 PROCEDURE — 3008F BODY MASS INDEX DOCD: CPT | Mod: ,,, | Performed by: NURSE PRACTITIONER

## 2024-05-29 PROCEDURE — 1159F MED LIST DOCD IN RCRD: CPT | Mod: ,,, | Performed by: NURSE PRACTITIONER

## 2024-05-29 PROCEDURE — 1160F RVW MEDS BY RX/DR IN RCRD: CPT | Mod: ,,, | Performed by: NURSE PRACTITIONER

## 2024-05-29 RX ORDER — PREGABALIN 150 MG/1
150 CAPSULE ORAL 2 TIMES DAILY
Qty: 30 CAPSULE | Refills: 2 | Status: CANCELLED | OUTPATIENT
Start: 2024-05-29

## 2024-05-29 RX ORDER — VENLAFAXINE HYDROCHLORIDE 37.5 MG/1
37.5 CAPSULE, EXTENDED RELEASE ORAL DAILY
Qty: 90 CAPSULE | Refills: 1 | Status: SHIPPED | OUTPATIENT
Start: 2024-05-29 | End: 2025-05-29

## 2024-05-29 RX ORDER — CARIPRAZINE 1.5 MG/1
1.5 CAPSULE, GELATIN COATED ORAL DAILY
Qty: 90 CAPSULE | Refills: 1 | Status: SHIPPED | OUTPATIENT
Start: 2024-05-29 | End: 2024-05-29

## 2024-05-29 RX ORDER — PREGABALIN 150 MG/1
150 CAPSULE ORAL 2 TIMES DAILY
Qty: 60 CAPSULE | Refills: 0 | Status: CANCELLED | OUTPATIENT
Start: 2024-05-29

## 2024-05-29 RX ORDER — FAMOTIDINE 20 MG/1
20 TABLET, FILM COATED ORAL DAILY
COMMUNITY

## 2024-05-29 RX ORDER — ARIPIPRAZOLE 2 MG/1
2 TABLET ORAL DAILY
Qty: 90 TABLET | Refills: 0 | Status: SHIPPED | OUTPATIENT
Start: 2024-05-29 | End: 2025-05-29

## 2024-05-29 RX ORDER — SEMAGLUTIDE 0.68 MG/ML
0.5 INJECTION, SOLUTION SUBCUTANEOUS
Qty: 3 ML | Refills: 11 | Status: SHIPPED | OUTPATIENT
Start: 2024-05-29

## 2024-05-29 NOTE — TELEPHONE ENCOUNTER
Can try abilify instead. Might be cheaper. I sent rx to pharmacy     Medications Ordered This Encounter   Medications    ARIPiprazole (ABILIFY) 2 MG Tab     Sig: Take 1 tablet (2 mg total) by mouth once daily.     Dispense:  90 tablet     Refill:  0

## 2024-05-29 NOTE — PROGRESS NOTES
Patient ID: Magda Wahl  : 1960     Chief Complaint: Depression    Allergies: Patient is allergic to fluoxetine, codeine, and olanzapine.     History of Present Illness:  The patient is a 63 y.o. White female who presents to clinic for evaluation and management with a chief complaint of Depression   Depression Patient Health Questionnaire (PHQ-2)  Over the last two weeks how often have you been bothered by little interest or pleasure in doing things: Nearly every day  Over the last two weeks how often have you been bothered by feeling down, depressed or hopeless: Nearly every day  PHQ-2 Total Score: 6  Depression Patient Health Questionnaire (PHQ-9)  Over the last two weeks how often have you been bothered by little interest or pleasure in doing things: Nearly every day  Over the last two weeks how often have you been bothered by feeling down, depressed or hopeless: Nearly every day  Over the last two weeks how often have you been bothered by trouble falling or staying asleep, or sleeping too much: Nearly every day  Over the last two weeks how often have you been bothered by feeling tired or having little energy: Nearly every day  Over the last two weeks how often have you been bothered by a poor appetite or overeating: Nearly every day  Over the last two weeks how often have you been bothered by feeling bad about yourself - or that you are a failure or have let yourself or your family down: Nearly every day  Over the last two weeks how often have you been bothered by trouble concentrating on things, such as reading the newspaper or watching television: Nearly every day  Over the last two weeks how often have you been bothered by moving or speaking so slowly that other people could have noticed. Or the opposite - being so fidgety or restless that you have been moving around a lot more than usual.: Nearly every day  Over the last two weeks how often have you been bothered by thoughts that you would be  better off dead, or of hurting yourself: Nearly every day  If you checked off any problems, how difficult have these problems made it for you to do your work, take care of things at home or get along with other people?: Very difficult  PHQ-9 Score: 27  PHQ-9 Interpretation: Severe    Generalized Anxiety Disorder 7-item (LESLEE-7) Scale. HOW OFTEN DURING THE PAST 2 WEEKS HAVE YOU FELT BOTHERED BY:  1. Feeling nervous, anxious, or on edge?: Nearly everyday  2. Not being able to stop or control worrying?: Nearly everyday  3. Worrying too much about different things?: Nearly everyday  4. Trouble relaxing?: Nearly everyday  5. Being so restless that it is hard to sit still?: Nearly everyday  6. Becoming easily annoyed or irritable?: Nearly everyday  7. Feeling afraid as if something awful might happen?: Nearly everyday  8. If you checked off any problems, how difficult have these problems made it for you to do your work, take care of things at home, or get along with other people?: Extremely difficult  LESLEE-7 Score: 21  Number answered (out of first 7): 7  Interpretation: Severe Anxiety      Has there ever been a period of time when you were not your usual self and...  you felt so good or so hyper that other people thought you were not your normal self or you were so hyper that you got into trouble?: No  you were so irritable that you shouted at people or started fights or arguments?: Yes  you felt much more self-confident than usual?: No  you got much less sleep than usual and found that you didn't really miss it?: No  you were more talkative or spoke much faster than usual?: Yes  thoughts raced through your head or you couldn't slow your mind down?: Yes  you were so easily distracted by things around you that you had trouble concentrating or staying on track?: Yes  you had more energy than usual?: No  you were much more active or did many more things than usual?: No  you were much more social or outgoing than usual, for  example, you telephoned friends in the middle of the night?: No  you were much more interested in sex than usual?: No  you did things that were unusual for you or that other people might have thought were excessive, foolish, or risky?: No  spending money got you or your family in trouble?: No    Patient was helping to care for 92 year old mother but can no longer do so due to sister causing problems.          Past Medical History:  has a past medical history of Bipolar disorder, Chronic venous insufficiency, Degenerative disc disease, lumbar, Depressive disorder, Hypothyroidism, Mild chronic obstructive pulmonary disease, Mitral valve insufficiency, Mixed hyperlipidemia, Obstructive sleep apnea syndrome, Tobacco user, Type 2 diabetes mellitus, and Vitamin D deficiency.    Social History:  reports that she has been smoking cigarettes. She started smoking about 49 years ago. She has a 24.7 pack-year smoking history. She has been exposed to tobacco smoke. She has never used smokeless tobacco. She reports that she does not currently use alcohol. She reports that she does not use drugs.    Care Team: Patient Care Team:  Omar Barboza APRN as PCP - General (Family Medicine)  Manas Medrano MD as Consulting Physician (Cardiology)  Buddy Franklin OD as Consulting Physician (Optometry)     Current Medications:  Current Outpatient Medications   Medication Instructions    blood sugar diagnostic Strp To check BG 1 times daily for NIDDM, to use with insurance preferred meter    blood-glucose meter kit To check BG 1 times daily, to use with insurance preferred meter    busPIRone (BUSPAR) 5 mg, Oral, Daily    clopidogreL (PLAVIX) 75 mg, Oral, Daily    famotidine (PEPCID) 20 mg, Oral, Daily    lancets Misc To check BG 1 times daily for NIDDM, to use with insurance preferred meter    levothyroxine (SYNTHROID) 88 mcg, Oral, Every morning    pregabalin (LYRICA) 150 mg, Oral, 2 times daily    rosuvastatin (CRESTOR) 40 mg,  "Oral, Daily    venlafaxine (EFFEXOR-XR) 37.5 mg, Oral, Daily       Review of Systems   Constitutional:  Negative for appetite change, fatigue, fever and unexpected weight change.   HENT:  Negative for nasal congestion, ear pain, facial swelling, hearing loss, mouth sores, nosebleeds, sore throat and trouble swallowing.    Eyes:  Negative for pain, discharge, redness and visual disturbance.   Respiratory:  Negative for cough, chest tightness and shortness of breath.    Cardiovascular:  Negative for chest pain, palpitations and leg swelling.   Gastrointestinal:  Negative for abdominal pain, blood in stool, constipation, diarrhea and nausea.   Endocrine: Negative for cold intolerance, heat intolerance, polydipsia, polyphagia and polyuria.   Genitourinary:  Negative for difficulty urinating, dysuria, frequency and hematuria.   Musculoskeletal:  Positive for arthralgias and myalgias. Negative for joint swelling and joint deformity.   Integumentary:  Negative for color change, rash and mole/lesion.   Neurological:  Negative for dizziness, weakness, headaches and memory loss.   Hematological:  Negative for adenopathy. Does not bruise/bleed easily.   Psychiatric/Behavioral:  Positive for agitation and sleep disturbance. Negative for confusion and suicidal ideas. The patient is nervous/anxious.         Visit Vitals  /62 (BP Location: Left arm)   Pulse 70   Temp 98.2 °F (36.8 °C) (Temporal)   Ht 5' 3.39" (1.61 m)   Wt 58.1 kg (128 lb)   SpO2 98%   BMI 22.40 kg/m²       Physical Exam  Vitals reviewed.   Constitutional:       General: She is not in acute distress.     Appearance: Normal appearance. She is overweight.   HENT:      Head: Normocephalic and atraumatic.      Nose: Nose normal.      Mouth/Throat:      Mouth: Mucous membranes are moist.      Pharynx: Oropharynx is clear.   Eyes:      Conjunctiva/sclera: Conjunctivae normal.      Comments: Wearing eyeglasses   Cardiovascular:      Rate and Rhythm: Normal rate and " regular rhythm.      Pulses: Normal pulses.      Heart sounds: Murmur (2/6) heard.   Pulmonary:      Effort: Pulmonary effort is normal.      Breath sounds: Normal breath sounds.   Musculoskeletal:         General: No swelling.   Skin:     General: Skin is warm and dry.      Coloration: Skin is not jaundiced.      Findings: No rash.   Neurological:      Mental Status: She is alert and oriented to person, place, and time.      Cranial Nerves: No cranial nerve deficit.   Psychiatric:         Mood and Affect: Mood normal.         Behavior: Behavior normal.          Labs Reviewed:  Chemistry:  Lab Results   Component Value Date     10/06/2023    K 4.1 10/06/2023    CHLORIDE 105 10/06/2023    BUN 12.0 10/06/2023    CREATININE 0.90 10/06/2023    EGFRNORACEVR 71 10/06/2023    GLUCOSE 123 (H) 10/06/2023    CALCIUM 9.2 10/06/2023    ALKPHOS 91 10/06/2023    LABPROT 6.8 10/06/2023    ALBUMIN 4.3 10/06/2023    AST 21 10/06/2023    ALT 15 10/06/2023    ITUWECOP23IX <20.0 (L) 06/21/2023    TSH 1.360 06/21/2023        Lab Results   Component Value Date    HGBA1C 5.9 03/05/2024        Hematology:  Lab Results   Component Value Date    WBC 5.82 06/21/2023    RBC 4.85 06/21/2023    HGB 14.5 06/21/2023    HCT 42.5 06/21/2023    MCV 87.6 06/21/2023    MCH 29.9 06/21/2023    MCHC 34.1 06/21/2023    RDW 12.4 06/21/2023     06/21/2023    MPV 11.2 06/21/2023       Lipid Panel:  Lab Results   Component Value Date    CHOL 135 10/06/2023    HDL 45 10/06/2023    DLDL 69.6 10/06/2023    TRIG 124 10/06/2023        Assessment & Plan:  1. Generalized anxiety disorder  Overview:  Only taking buspar 5mg - takes 1/2 tab every morning.    Orders:  -     venlafaxine (EFFEXOR-XR) 37.5 MG 24 hr capsule; Take 1 capsule (37.5 mg total) by mouth once daily.  Dispense: 90 capsule; Refill: 1    2. Positive depression screening  Comments:  I have reviewed the positive depression score which warrants active treatment with psychotherapy and/or  medications.    3. Severe depression  Overview:  Did not tolerate zoloft and prozac in the past.  Tolerated  cymbalta and venlafaxine in the past. Restart venlafaxine per patient request. Declines counseling.     Orders:  -     venlafaxine (EFFEXOR-XR) 37.5 MG 24 hr capsule; Take 1 capsule (37.5 mg total) by mouth once daily.  Dispense: 90 capsule; Refill: 1    4. Degenerative disc disease, lumbar  Overview:  Fall in 2010 at work, worked up by Dr. Soto and workman's comp.  Deemed disabled.  Completed injections and PT in the past. Did not tolerate gabapentin, now takes lyrica only once daily PRN, filled per Dr. Mendoza.       5. Type 2 diabetes mellitus with hyperglycemia, without long-term current use of insulin  Overview:  Established with Dr. Franklin for vision.  Last prescribed metformin 1000 daily. Stopped d/t side effects. Improved with ozempic but can no longer afford.  Send rx back to outpatient pharmacy.     Orders:  -     semaglutide (OZEMPIC) 0.25 mg or 0.5 mg (2 mg/3 mL) pen injector; Inject 0.5 mg into the skin every 7 days.  Dispense: 3 mL; Refill: 11    6. Bipolar affective disorder, remission status unspecified  Overview:  Allergy to zyprexa, abilify ineffective. Last took vraylar 1.5- restart.     Orders:  -     cariprazine (VRAYLAR) 1.5 mg Cap; Take 1 capsule (1.5 mg total) by mouth Daily.  Dispense: 90 capsule; Refill: 1         Future Appointments   Date Time Provider Department Center   7/31/2024  8:10 AM LAB, [facility] LABORATORY DRAW STATION [facility]   8/6/2024 10:30 AM Omar Barboza APRN [facility]       Follow up for 6 wk f/u virtual visit Depression/Anxiety. Call sooner if needed.    SOLIS SANCHEZ    Lab Frequency Next Occurrence   Ambulatory referral/consult to Obstetrics / Gynecology Once 07/10/2023   Mammo Digital Screening Bilat w/ Medhat Once 07/03/2023   CBC Auto Differential Once 06/11/2024   Comprehensive Metabolic Panel Once 06/11/2024   Lipid Panel Once  06/11/2024   TSH Once 06/11/2024   Hemoglobin A1C Once 06/11/2024   Microalbumin/Creatinine Ratio, Urine Once 06/11/2024   Vitamin D Once 06/11/2024          I have reviewed the positive depression score which warrants active treatment with psychotherapy and/or medications.

## 2024-06-10 ENCOUNTER — TELEPHONE (OUTPATIENT)
Dept: FAMILY MEDICINE | Facility: CLINIC | Age: 64
End: 2024-06-10
Payer: MEDICARE

## 2024-06-10 NOTE — TELEPHONE ENCOUNTER
Pt states her anxiety is really high and she is taking 2 buspars a day, advised to give effexor more time to work. Pt verbalized understanding

## 2024-07-09 ENCOUNTER — OFFICE VISIT (OUTPATIENT)
Dept: FAMILY MEDICINE | Facility: CLINIC | Age: 64
End: 2024-07-09
Payer: MEDICARE

## 2024-07-09 DIAGNOSIS — F41.1 GENERALIZED ANXIETY DISORDER: Primary | ICD-10-CM

## 2024-07-09 DIAGNOSIS — Z13.31 POSITIVE DEPRESSION SCREENING: ICD-10-CM

## 2024-07-09 DIAGNOSIS — G47.09 OTHER INSOMNIA: ICD-10-CM

## 2024-07-09 DIAGNOSIS — F32.2 SEVERE DEPRESSION: ICD-10-CM

## 2024-07-09 DIAGNOSIS — E11.65 TYPE 2 DIABETES MELLITUS WITH HYPERGLYCEMIA, WITHOUT LONG-TERM CURRENT USE OF INSULIN: ICD-10-CM

## 2024-07-09 PROCEDURE — 1160F RVW MEDS BY RX/DR IN RCRD: CPT | Mod: 95,,, | Performed by: NURSE PRACTITIONER

## 2024-07-09 PROCEDURE — 1159F MED LIST DOCD IN RCRD: CPT | Mod: 95,,, | Performed by: NURSE PRACTITIONER

## 2024-07-09 PROCEDURE — 99214 OFFICE O/P EST MOD 30 MIN: CPT | Mod: 95,,, | Performed by: NURSE PRACTITIONER

## 2024-07-09 PROCEDURE — 3044F HG A1C LEVEL LT 7.0%: CPT | Mod: 95,,, | Performed by: NURSE PRACTITIONER

## 2024-07-09 RX ORDER — DOXEPIN 3 MG/1
3 TABLET, FILM COATED ORAL NIGHTLY
Qty: 30 TABLET | Refills: 3 | Status: SHIPPED | OUTPATIENT
Start: 2024-07-09

## 2024-07-09 RX ORDER — VENLAFAXINE HYDROCHLORIDE 75 MG/1
75 CAPSULE, EXTENDED RELEASE ORAL DAILY
Qty: 90 CAPSULE | Refills: 1 | Status: SHIPPED | OUTPATIENT
Start: 2024-07-09 | End: 2025-07-09

## 2024-07-09 RX ORDER — BUSPIRONE HYDROCHLORIDE 5 MG/1
10 TABLET ORAL DAILY
Qty: 180 TABLET | Refills: 3 | Status: SHIPPED | OUTPATIENT
Start: 2024-07-09

## 2024-07-09 NOTE — PROGRESS NOTES
Patient ID: Magda Wahl  : 1960     Chief Complaint: follow up anxiety and depression     Allergies: Patient is allergic to fluoxetine, codeine, and olanzapine.     This is a telemedicine note. Patient was treated using telemedicine, real time audio and video, according to [facility] protocols. Omar [facility], conducted the visit from the [facility]. The patient participated in the visit at a non-[facility] location selected by the patient. I am licensed in the state where the patient stated they are located. The patient stated that they understood and accepted the privacy and security risks to their information at their location. The patient verbally consented to proceed with a telemedicine visit. This visit is not recorded. Patient was located at the patient's home, [place], [place].     Video Time Documentation:  Time of call:   Spent 12 minutes with patient face to face discussed health concerns.     History of Present Illness:  The patient is a 63 y.o. White female who scheduled telemedicine visit for chief complaint of follow up anxiety and depression    Patient was helping to care for 92 year old mother but can no longer do so due to sister causing problems.   Pateint is f/u today on restarting effexor and buspar. Reports mood improving but not at goal. Denies any adverse affects. Does c/o difficulty staying asleep.     Depression Patient Health Questionnaire (PHQ-2)  Over the last two weeks how often have you been bothered by little interest or pleasure in doing things: Not at all  Over the last two weeks how often have you been bothered by feeling down, depressed or hopeless: Nearly every day  PHQ-2 Total Score: 3  Depression Patient Health Questionnaire (PHQ-9)  Over the last two weeks how often have you been bothered by little interest or pleasure in doing things: Not at all  Over the last two weeks how often have you been bothered by feeling down, depressed or hopeless:  Nearly every day  Over the last two weeks how often have you been bothered by trouble falling or staying asleep, or sleeping too much: More than half the days  Over the last two weeks how often have you been bothered by feeling tired or having little energy: More than half the days  Over the last two weeks how often have you been bothered by a poor appetite or overeating: Not at all  Over the last two weeks how often have you been bothered by feeling bad about yourself - or that you are a failure or have let yourself or your family down: Several days  Over the last two weeks how often have you been bothered by trouble concentrating on things, such as reading the newspaper or watching television: More than half the days  Over the last two weeks how often have you been bothered by moving or speaking so slowly that other people could have noticed. Or the opposite - being so fidgety or restless that you have been moving around a lot more than usual.: More than half the days  Over the last two weeks how often have you been bothered by thoughts that you would be better off dead, or of hurting yourself: Not at all  If you checked off any problems, how difficult have these problems made it for you to do your work, take care of things at home or get along with other people?: Very difficult  PHQ-9 Score: 12  PHQ-9 Interpretation: Moderate      Generalized Anxiety Disorder 7-item (LESLEE-7) Scale. HOW OFTEN DURING THE PAST 2 WEEKS HAVE YOU FELT BOTHERED BY:  1. Feeling nervous, anxious, or on edge?: Nearly everyday  2. Not being able to stop or control worrying?: Several days  3. Worrying too much about different things?: More than half the days  4. Trouble relaxing?: Several days  5. Being so restless that it is hard to sit still?: Several days  6. Becoming easily annoyed or irritable?: Nearly everyday  7. Feeling afraid as if something awful might happen?: Several days  8. If you checked off any problems, how difficult have  these problems made it for you to do your work, take care of things at home, or get along with other people?: Very difficult  LESLEE-7 Score: 12  Number answered (out of first 7): 7  Interpretation: Moderate Anxiety      Patient also reports she is not taking ozempic d/t cost.            Past Medical History:  has a past medical history of Bipolar disorder, Chronic venous insufficiency, Degenerative disc disease, lumbar, Depressive disorder, Hypothyroidism, Mild chronic obstructive pulmonary disease, Mitral valve insufficiency, Mixed hyperlipidemia, Obstructive sleep apnea syndrome, Tobacco user, Type 2 diabetes mellitus, and Vitamin D deficiency.    Social History:  reports that she has been smoking cigarettes. She started smoking about 49 years ago. She has a 24.8 pack-year smoking history. She has been exposed to tobacco smoke. She has never used smokeless tobacco. She reports that she does not currently use alcohol. She reports that she does not use drugs.    Family History: family history includes Arthritis in her mother; Cancer in her father and mother; Depression in her sister; Diabetes in her father and mother; Hearing loss in her father.    Care Team: Patient Care Team:  Omar Barboza APRN as PCP - General (Family Medicine)  Manas Medrano MD as Consulting Physician (Cardiology)  Buddy Franklin OD as Consulting Physician (Optometry)     Current Medications:  Current Outpatient Medications   Medication Instructions    ARIPiprazole (ABILIFY) 2 mg, Oral, Daily    blood sugar diagnostic Strp To check BG 1 times daily for NIDDM, to use with insurance preferred meter    blood-glucose meter kit To check BG 1 times daily, to use with insurance preferred meter    busPIRone (BUSPAR) 10 mg, Oral, Daily    clopidogreL (PLAVIX) 75 mg, Oral, Daily    doxepin (SILENOR) 3 mg, Oral, Nightly    famotidine (PEPCID) 20 mg, Oral, Daily    lancets Misc To check BG 1 times daily for NIDDM, to use with insurance preferred  meter    levothyroxine (SYNTHROID) 88 mcg, Oral, Every morning    liraglutide 0.6 mg/0.1 mL, 18 mg/3 mL, subq PNIJ (VICTOZA 2-MARTA) 0.6 mg/0.1 mL (18 mg/3 mL) PnIj pen Inject 0.6 mg into the skin once daily for 7 days, THEN 1.2 mg once daily.    pregabalin (LYRICA) 150 mg, Oral, 2 times daily    rosuvastatin (CRESTOR) 40 mg, Oral, Daily    venlafaxine (EFFEXOR-XR) 75 mg, Oral, Daily       Patient is allergic to fluoxetine, codeine, and olanzapine.     Review of Systems   Constitutional:  Negative for activity change and unexpected weight change.   HENT:  Positive for hearing loss. Negative for rhinorrhea and trouble swallowing.    Eyes:  Negative for discharge and visual disturbance.   Respiratory:  Negative for chest tightness and wheezing.    Cardiovascular:  Negative for chest pain and palpitations.   Gastrointestinal:  Negative for blood in stool, constipation, diarrhea and vomiting.   Endocrine: Negative for polydipsia and polyuria.   Genitourinary:  Negative for difficulty urinating, dysuria, hematuria and menstrual problem.   Musculoskeletal:  Negative for arthralgias, joint swelling and neck pain.   Neurological:  Negative for weakness and headaches.   Psychiatric/Behavioral:  Positive for dysphoric mood. Negative for confusion.         There were no vitals taken for this visit.    Physical Exam: LIMITED DUE TO TELEMEDICINE RESTRICTIONS.  General: Well nourished, Alert and oriented, No acute distress.  ENMT: Sclera non-icteric, conversational hearing normal.  Respiratory: Non-labored respirations, no dyspnea with conversation.  Musculoskeletal: normal movement of all extremities that are visible in video frame  Integumentary:  No visible suspicious lesions or rashes. No diaphoresis noted.   Psychiatric: Normal interaction, Coherent speech, Euthymic mood, Appropriate affect     Assessment & Plan:  1. Generalized anxiety disorder  Overview:  Increase buspart to 10mg BID.     Orders:  -     venlafaxine  (EFFEXOR-XR) 75 MG 24 hr capsule; Take 1 capsule (75 mg total) by mouth once daily.  Dispense: 90 capsule; Refill: 1  -     busPIRone (BUSPAR) 5 MG Tab; Take 2 tablets (10 mg total) by mouth once daily.  Dispense: 180 tablet; Refill: 3    2. Severe depression  Overview:  Did not tolerate zoloft and prozac in the past.  Tolerated  cymbalta and venlafaxine in the past. Restarted venlafaxin- increase to 75mg daily. Declines counseling.     Orders:  -     venlafaxine (EFFEXOR-XR) 75 MG 24 hr capsule; Take 1 capsule (75 mg total) by mouth once daily.  Dispense: 90 capsule; Refill: 1    3. Positive depression screening  Comments:  I have reviewed the positive depression score which warrants active treatment with psychotherapy and/or medications.    4. Other insomnia  Overview:  Begin silenor.     Assessment & Plan:  The risks and benefits of medications were discussed with the patient. All questions answered.       Orders:  -     doxepin (SILENOR) 3 mg Tab; Take 3 mg by mouth every evening.  Dispense: 30 tablet; Refill: 3    5. Type 2 diabetes mellitus with hyperglycemia, without long-term current use of insulin  Overview:  Established with Dr. Franklin for vision.  Last prescribed metformin 1000 daily. Stopped d/t side effects. Improved with ozempic but can no longer afford.  Send victoza to [facility]- if still not affordable will send byetta.     Orders:  -     liraglutide 0.6 mg/0.1 mL, 18 mg/3 mL, subq PNIJ (VICTOZA 2-MARTA) 0.6 mg/0.1 mL (18 mg/3 mL) PnIj pen; Inject 0.6 mg into the skin once daily for 7 days, THEN 1.2 mg once daily.  Dispense: 12 mL; Refill: 11         Future Appointments   Date Time Provider Department Center   7/31/2024  8:10 AM LAB, [facility] LABORATORY DRAW STATION [facility]   8/6/2024 10:30 AM Omar Barboza APRN [facility]       Follow up for Keep Apt as Scheduled. Call sooner if needed.    Lab Frequency Next Occurrence   Ambulatory referral/consult to Obstetrics / Gynecology  Once 07/10/2023   Mammo Digital Screening Bilat w/ Medhat Once 07/03/2023   CBC Auto Differential Once 06/11/2024   Comprehensive Metabolic Panel Once 06/11/2024   Lipid Panel Once 06/11/2024   TSH Once 06/11/2024   Hemoglobin A1C Once 06/11/2024   Microalbumin/Creatinine Ratio, Urine Once 06/11/2024   Vitamin D Once 06/11/2024        SOLIS SANCHEZ     I have used clinical judgement based on duration and functional status to consider definite necessity for treatment.

## 2024-07-16 ENCOUNTER — TELEPHONE (OUTPATIENT)
Dept: FAMILY MEDICINE | Facility: CLINIC | Age: 64
End: 2024-07-16
Payer: MEDICARE

## 2024-07-16 DIAGNOSIS — E11.65 TYPE 2 DIABETES MELLITUS WITH HYPERGLYCEMIA, WITHOUT LONG-TERM CURRENT USE OF INSULIN: Primary | ICD-10-CM

## 2024-07-16 RX ORDER — EXENATIDE 250 UG/ML
5 INJECTION SUBCUTANEOUS 2 TIMES DAILY WITH MEALS
Qty: 1.2 ML | Refills: 11 | Status: SHIPPED | OUTPATIENT
Start: 2024-07-16 | End: 2025-07-16

## 2024-07-16 NOTE — TELEPHONE ENCOUNTER
Vicotza is still on back order. Patient would like to switch to Byetta. I called the hops and I was told that they do not know when they will get more victoza in. Please advise. She is type 2 DM and is unable to afford ozempic.

## 2024-07-16 NOTE — TELEPHONE ENCOUNTER
I have written for the following medications and/or orders for the patient.  Please notify the patient.        Medications Ordered This Encounter   Medications    exenatide (BYETTA) 5 mcg/dose (250 mcg/mL) 1.2 mL injection     Sig: Inject 0.02 mLs (5 mcg total) into the skin 2 (two) times daily with meals.     Dispense:  1.2 mL     Refill:  11

## 2024-07-30 ENCOUNTER — TELEPHONE (OUTPATIENT)
Dept: FAMILY MEDICINE | Facility: CLINIC | Age: 64
End: 2024-07-30
Payer: MEDICARE

## 2024-08-01 PROCEDURE — 82043 UR ALBUMIN QUANTITATIVE: CPT | Performed by: NURSE PRACTITIONER

## 2024-08-01 PROCEDURE — 84443 ASSAY THYROID STIM HORMONE: CPT | Performed by: NURSE PRACTITIONER

## 2024-08-01 PROCEDURE — 82306 VITAMIN D 25 HYDROXY: CPT | Performed by: NURSE PRACTITIONER

## 2024-08-01 PROCEDURE — 82570 ASSAY OF URINE CREATININE: CPT | Performed by: NURSE PRACTITIONER

## 2024-08-01 PROCEDURE — 83036 HEMOGLOBIN GLYCOSYLATED A1C: CPT | Performed by: NURSE PRACTITIONER

## 2024-08-01 PROCEDURE — 85025 COMPLETE CBC W/AUTO DIFF WBC: CPT | Performed by: NURSE PRACTITIONER

## 2024-08-01 PROCEDURE — 80053 COMPREHEN METABOLIC PANEL: CPT | Performed by: NURSE PRACTITIONER

## 2024-08-01 PROCEDURE — 80061 LIPID PANEL: CPT | Performed by: NURSE PRACTITIONER

## 2024-08-05 DIAGNOSIS — E03.9 HYPOTHYROIDISM, UNSPECIFIED TYPE: ICD-10-CM

## 2024-08-05 RX ORDER — LEVOTHYROXINE SODIUM 88 UG/1
88 TABLET ORAL EVERY MORNING
Qty: 90 TABLET | Refills: 2 | OUTPATIENT
Start: 2024-08-05

## 2024-08-06 ENCOUNTER — OFFICE VISIT (OUTPATIENT)
Dept: FAMILY MEDICINE | Facility: CLINIC | Age: 64
End: 2024-08-06
Payer: MEDICARE

## 2024-08-06 ENCOUNTER — PATIENT MESSAGE (OUTPATIENT)
Dept: FAMILY MEDICINE | Facility: CLINIC | Age: 64
End: 2024-08-06

## 2024-08-06 VITALS
SYSTOLIC BLOOD PRESSURE: 128 MMHG | TEMPERATURE: 98 F | HEART RATE: 75 BPM | DIASTOLIC BLOOD PRESSURE: 74 MMHG | WEIGHT: 135 LBS | BODY MASS INDEX: 23.92 KG/M2 | OXYGEN SATURATION: 98 % | HEIGHT: 63 IN

## 2024-08-06 DIAGNOSIS — Z91.81 AT RISK FOR FALLS: ICD-10-CM

## 2024-08-06 DIAGNOSIS — E55.9 VITAMIN D DEFICIENCY: ICD-10-CM

## 2024-08-06 DIAGNOSIS — Z72.0 TOBACCO USER: ICD-10-CM

## 2024-08-06 DIAGNOSIS — E03.9 ACQUIRED HYPOTHYROIDISM: ICD-10-CM

## 2024-08-06 DIAGNOSIS — E78.2 MIXED HYPERLIPIDEMIA: ICD-10-CM

## 2024-08-06 DIAGNOSIS — J44.9 MILD CHRONIC OBSTRUCTIVE PULMONARY DISEASE: ICD-10-CM

## 2024-08-06 DIAGNOSIS — E78.49 OTHER HYPERLIPIDEMIA: ICD-10-CM

## 2024-08-06 DIAGNOSIS — Z13.31 POSITIVE SCREENING FOR DEPRESSION ON 9-ITEM PATIENT HEALTH QUESTIONNAIRE (PHQ-9): ICD-10-CM

## 2024-08-06 DIAGNOSIS — I34.0 NONRHEUMATIC MITRAL VALVE REGURGITATION: ICD-10-CM

## 2024-08-06 DIAGNOSIS — Z78.0 POST-MENOPAUSAL: ICD-10-CM

## 2024-08-06 DIAGNOSIS — G47.33 OBSTRUCTIVE SLEEP APNEA SYNDROME: ICD-10-CM

## 2024-08-06 DIAGNOSIS — Z00.00 MEDICARE ANNUAL WELLNESS VISIT, INITIAL: Primary | ICD-10-CM

## 2024-08-06 DIAGNOSIS — F32.2 SEVERE DEPRESSION: ICD-10-CM

## 2024-08-06 DIAGNOSIS — R01.1 HEART MURMUR: ICD-10-CM

## 2024-08-06 DIAGNOSIS — Z13.31 POSITIVE DEPRESSION SCREENING: ICD-10-CM

## 2024-08-06 DIAGNOSIS — Z72.3 LACK OF PHYSICAL ACTIVITY: ICD-10-CM

## 2024-08-06 DIAGNOSIS — G47.09 OTHER INSOMNIA: ICD-10-CM

## 2024-08-06 DIAGNOSIS — F41.1 GENERALIZED ANXIETY DISORDER: ICD-10-CM

## 2024-08-06 DIAGNOSIS — F41.9 ANXIETY: ICD-10-CM

## 2024-08-06 DIAGNOSIS — E03.9 HYPOTHYROIDISM, UNSPECIFIED TYPE: ICD-10-CM

## 2024-08-06 DIAGNOSIS — Z87.891 PERSONAL HISTORY OF NICOTINE DEPENDENCE: ICD-10-CM

## 2024-08-06 DIAGNOSIS — F31.9 BIPOLAR AFFECTIVE DISORDER, REMISSION STATUS UNSPECIFIED: ICD-10-CM

## 2024-08-06 DIAGNOSIS — Z12.2 ENCOUNTER FOR SCREENING FOR LUNG CANCER: ICD-10-CM

## 2024-08-06 DIAGNOSIS — E11.65 TYPE 2 DIABETES MELLITUS WITH HYPERGLYCEMIA, WITHOUT LONG-TERM CURRENT USE OF INSULIN: ICD-10-CM

## 2024-08-06 PROBLEM — E66.3 OVERWEIGHT WITH BODY MASS INDEX (BMI) 25.0-29.9: Status: RESOLVED | Noted: 2023-07-03 | Resolved: 2024-08-06

## 2024-08-06 PROCEDURE — 1160F RVW MEDS BY RX/DR IN RCRD: CPT | Mod: ,,, | Performed by: NURSE PRACTITIONER

## 2024-08-06 PROCEDURE — 3044F HG A1C LEVEL LT 7.0%: CPT | Mod: ,,, | Performed by: NURSE PRACTITIONER

## 2024-08-06 PROCEDURE — 3066F NEPHROPATHY DOC TX: CPT | Mod: ,,, | Performed by: NURSE PRACTITIONER

## 2024-08-06 PROCEDURE — 3061F NEG MICROALBUMINURIA REV: CPT | Mod: ,,, | Performed by: NURSE PRACTITIONER

## 2024-08-06 PROCEDURE — 3078F DIAST BP <80 MM HG: CPT | Mod: ,,, | Performed by: NURSE PRACTITIONER

## 2024-08-06 PROCEDURE — G0438 PPPS, INITIAL VISIT: HCPCS | Mod: ,,, | Performed by: NURSE PRACTITIONER

## 2024-08-06 PROCEDURE — 1159F MED LIST DOCD IN RCRD: CPT | Mod: ,,, | Performed by: NURSE PRACTITIONER

## 2024-08-06 PROCEDURE — 3074F SYST BP LT 130 MM HG: CPT | Mod: ,,, | Performed by: NURSE PRACTITIONER

## 2024-08-06 RX ORDER — BUSPIRONE HYDROCHLORIDE 10 MG/1
10 TABLET ORAL DAILY
Qty: 180 TABLET | Refills: 1 | Status: SHIPPED | OUTPATIENT
Start: 2024-08-06

## 2024-08-06 RX ORDER — LEVOTHYROXINE SODIUM 88 UG/1
88 TABLET ORAL EVERY MORNING
Qty: 90 TABLET | Refills: 3 | Status: SHIPPED | OUTPATIENT
Start: 2024-08-06

## 2024-08-06 RX ORDER — ROSUVASTATIN CALCIUM 40 MG/1
40 TABLET, COATED ORAL DAILY
Qty: 90 TABLET | Refills: 3 | Status: SHIPPED | OUTPATIENT
Start: 2024-08-06

## 2024-08-06 RX ORDER — CARIPRAZINE 1.5 MG/1
1.5 CAPSULE, GELATIN COATED ORAL DAILY
Qty: 90 CAPSULE | Refills: 1 | Status: SHIPPED | OUTPATIENT
Start: 2024-08-06

## 2024-08-13 ENCOUNTER — HOSPITAL ENCOUNTER (EMERGENCY)
Facility: HOSPITAL | Age: 64
Discharge: PSYCHIATRIC OR SUBSTANCE USE FACILITY | End: 2024-08-14
Payer: MEDICARE

## 2024-08-13 DIAGNOSIS — T50.902A INTENTIONAL OVERDOSE, INITIAL ENCOUNTER: ICD-10-CM

## 2024-08-13 DIAGNOSIS — F10.920 ALCOHOLIC INTOXICATION WITHOUT COMPLICATION: ICD-10-CM

## 2024-08-13 DIAGNOSIS — Z00.8 MEDICAL CLEARANCE FOR PSYCHIATRIC ADMISSION: ICD-10-CM

## 2024-08-13 DIAGNOSIS — T50.902A SUICIDE ATTEMPT BY DRUG INGESTION, INITIAL ENCOUNTER: Primary | ICD-10-CM

## 2024-08-13 LAB
ALBUMIN SERPL-MCNC: 4.4 G/DL (ref 3.4–5)
ALBUMIN/GLOB SERPL: 1.5 RATIO
ALP SERPL-CCNC: 90 UNIT/L (ref 50–144)
ALT SERPL-CCNC: 31 UNIT/L (ref 1–45)
AMPHET UR QL SCN: NEGATIVE
ANION GAP SERPL CALC-SCNC: 11 MEQ/L (ref 2–13)
APAP SERPL-MCNC: <10 UG/ML (ref 10–30)
AST SERPL-CCNC: 33 UNIT/L (ref 14–36)
BACTERIA #/AREA URNS AUTO: NORMAL /HPF
BARBITURATE SCN PRESENT UR: NEGATIVE
BASOPHILS # BLD AUTO: 0.06 X10(3)/MCL (ref 0.01–0.08)
BASOPHILS NFR BLD AUTO: 0.7 % (ref 0.1–1.2)
BENZODIAZ UR QL SCN: NEGATIVE
BILIRUB SERPL-MCNC: 0.4 MG/DL (ref 0–1)
BILIRUB UR QL STRIP.AUTO: NEGATIVE
BUN SERPL-MCNC: 5 MG/DL (ref 7–20)
CALCIUM SERPL-MCNC: 9.3 MG/DL (ref 8.4–10.2)
CANNABINOIDS UR QL SCN: NEGATIVE
CHLORIDE SERPL-SCNC: 107 MMOL/L (ref 98–110)
CLARITY UR: CLEAR
CO2 SERPL-SCNC: 20 MMOL/L (ref 21–32)
COCAINE UR QL SCN: NEGATIVE
COLOR UR AUTO: YELLOW
CREAT SERPL-MCNC: 0.64 MG/DL (ref 0.66–1.25)
CREAT/UREA NIT SERPL: 8 (ref 12–20)
EOSINOPHIL # BLD AUTO: 0.03 X10(3)/MCL (ref 0.04–0.36)
EOSINOPHIL NFR BLD AUTO: 0.4 % (ref 0.7–7)
ERYTHROCYTE [DISTWIDTH] IN BLOOD BY AUTOMATED COUNT: 12.3 % (ref 11–14.5)
ETHANOL BLD-MCNC: 0.1 G/DL
ETHANOL SERPL-MCNC: 97 MG/DL
GFR SERPLBLD CREATININE-BSD FMLA CKD-EPI: >90 ML/MIN/1.73/M2
GLOBULIN SER-MCNC: 3 GM/DL (ref 2–3.9)
GLUCOSE SERPL-MCNC: 214 MG/DL (ref 70–115)
GLUCOSE UR QL STRIP: NEGATIVE
HCT VFR BLD AUTO: 42 % (ref 36–48)
HGB BLD-MCNC: 14.3 G/DL (ref 11.8–16)
HGB UR QL STRIP: ABNORMAL
IMM GRANULOCYTES # BLD AUTO: 0.03 X10(3)/MCL (ref 0–0.03)
IMM GRANULOCYTES NFR BLD AUTO: 0.4 % (ref 0–0.5)
KETONES UR QL STRIP: NEGATIVE
LEUKOCYTE ESTERASE UR QL STRIP: NEGATIVE
LYMPHOCYTES # BLD AUTO: 1.42 X10(3)/MCL (ref 1.16–3.74)
LYMPHOCYTES NFR BLD AUTO: 17.5 % (ref 20–55)
MCH RBC QN AUTO: 30 PG (ref 27–34)
MCHC RBC AUTO-ENTMCNC: 34 G/DL (ref 31–37)
MCV RBC AUTO: 88.2 FL (ref 79–99)
METHADONE UR QL SCN: NEGATIVE
MONOCYTES # BLD AUTO: 0.42 X10(3)/MCL (ref 0.24–0.36)
MONOCYTES NFR BLD AUTO: 5.2 % (ref 4.7–12.5)
NEUTROPHILS # BLD AUTO: 6.15 X10(3)/MCL (ref 1.56–6.13)
NEUTROPHILS NFR BLD AUTO: 75.8 % (ref 37–73)
NITRITE UR QL STRIP: NEGATIVE
NRBC BLD AUTO-RTO: 0 %
OPIATES UR QL SCN: NEGATIVE
PCP UR QL: NEGATIVE
PH UR STRIP: 6 [PH]
PH UR: 6 [PH] (ref 5–8)
PLATELET # BLD AUTO: 292 X10(3)/MCL (ref 140–371)
PMV BLD AUTO: 10.3 FL (ref 9.4–12.4)
POTASSIUM SERPL-SCNC: 3.6 MMOL/L (ref 3.5–5.1)
PROT SERPL-MCNC: 7.4 GM/DL (ref 6.3–8.2)
PROT UR QL STRIP: NEGATIVE
RBC # BLD AUTO: 4.76 X10(6)/MCL (ref 4–5.1)
RBC #/AREA URNS AUTO: NORMAL /HPF
SARS-COV-2 RDRP RESP QL NAA+PROBE: NEGATIVE
SODIUM SERPL-SCNC: 138 MMOL/L (ref 136–145)
SP GR UR STRIP.AUTO: <=1.005 (ref 1–1.03)
SQUAMOUS #/AREA URNS AUTO: NORMAL /HPF
TSH SERPL-ACNC: 5.06 UIU/ML (ref 0.36–3.74)
UROBILINOGEN UR STRIP-ACNC: 0.2
WBC # BLD AUTO: 8.11 X10(3)/MCL (ref 4–11.5)
WBC #/AREA URNS AUTO: NORMAL /HPF

## 2024-08-13 PROCEDURE — U0002 COVID-19 LAB TEST NON-CDC: HCPCS

## 2024-08-13 PROCEDURE — 81015 MICROSCOPIC EXAM OF URINE: CPT

## 2024-08-13 PROCEDURE — 82077 ASSAY SPEC XCP UR&BREATH IA: CPT

## 2024-08-13 PROCEDURE — 99285 EMERGENCY DEPT VISIT HI MDM: CPT | Mod: 25

## 2024-08-13 PROCEDURE — 25000003 PHARM REV CODE 250

## 2024-08-13 PROCEDURE — 80307 DRUG TEST PRSMV CHEM ANLYZR: CPT

## 2024-08-13 PROCEDURE — 80053 COMPREHEN METABOLIC PANEL: CPT

## 2024-08-13 PROCEDURE — 80143 DRUG ASSAY ACETAMINOPHEN: CPT

## 2024-08-13 PROCEDURE — 81003 URINALYSIS AUTO W/O SCOPE: CPT

## 2024-08-13 PROCEDURE — 93005 ELECTROCARDIOGRAM TRACING: CPT

## 2024-08-13 PROCEDURE — 85025 COMPLETE CBC W/AUTO DIFF WBC: CPT

## 2024-08-13 PROCEDURE — 93010 ELECTROCARDIOGRAM REPORT: CPT | Mod: ,,, | Performed by: INTERNAL MEDICINE

## 2024-08-13 PROCEDURE — 96361 HYDRATE IV INFUSION ADD-ON: CPT

## 2024-08-13 PROCEDURE — 96360 HYDRATION IV INFUSION INIT: CPT

## 2024-08-13 PROCEDURE — 84443 ASSAY THYROID STIM HORMONE: CPT

## 2024-08-13 RX ORDER — AZELASTINE 1 MG/ML
1 SPRAY, METERED NASAL 2 TIMES DAILY
Status: ON HOLD | COMMUNITY
End: 2024-08-22 | Stop reason: HOSPADM

## 2024-08-13 RX ORDER — ARIPIPRAZOLE 2 MG/1
5 TABLET ORAL DAILY
Status: ON HOLD | COMMUNITY
End: 2024-08-22 | Stop reason: HOSPADM

## 2024-08-13 RX ORDER — MONTELUKAST SODIUM 10 MG/1
10 TABLET ORAL NIGHTLY
COMMUNITY

## 2024-08-13 RX ADMIN — SODIUM CHLORIDE 1000 ML: 9 INJECTION, SOLUTION INTRAVENOUS at 08:08

## 2024-08-14 ENCOUNTER — HOSPITAL ENCOUNTER (INPATIENT)
Facility: HOSPITAL | Age: 64
LOS: 9 days | Discharge: HOME | DRG: 918 | End: 2024-08-23
Attending: PSYCHIATRY & NEUROLOGY | Admitting: PSYCHIATRY & NEUROLOGY
Payer: MEDICARE

## 2024-08-14 VITALS
HEIGHT: 63 IN | TEMPERATURE: 98 F | RESPIRATION RATE: 16 BRPM | HEART RATE: 86 BPM | BODY MASS INDEX: 24.8 KG/M2 | SYSTOLIC BLOOD PRESSURE: 116 MMHG | OXYGEN SATURATION: 99 % | DIASTOLIC BLOOD PRESSURE: 68 MMHG | WEIGHT: 140 LBS

## 2024-08-14 DIAGNOSIS — R45.851 DEPRESSION WITH SUICIDAL IDEATION: ICD-10-CM

## 2024-08-14 DIAGNOSIS — F32.A DEPRESSION WITH SUICIDAL IDEATION: ICD-10-CM

## 2024-08-14 LAB
OHS QRS DURATION: 82 MS
OHS QTC CALCULATION: 453 MS
POCT GLUCOSE: 84 MG/DL (ref 70–110)

## 2024-08-14 PROCEDURE — 25000003 PHARM REV CODE 250: Performed by: PSYCHIATRY & NEUROLOGY

## 2024-08-14 PROCEDURE — S4991 NICOTINE PATCH NONLEGEND: HCPCS | Performed by: FAMILY MEDICINE

## 2024-08-14 PROCEDURE — 25000003 PHARM REV CODE 250: Performed by: FAMILY MEDICINE

## 2024-08-14 PROCEDURE — 11400000 HC PSYCH PRIVATE ROOM

## 2024-08-14 RX ORDER — ALUMINUM HYDROXIDE, MAGNESIUM HYDROXIDE, AND SIMETHICONE 1200; 120; 1200 MG/30ML; MG/30ML; MG/30ML
30 SUSPENSION ORAL EVERY 6 HOURS PRN
Status: DISCONTINUED | OUTPATIENT
Start: 2024-08-14 | End: 2024-08-23 | Stop reason: HOSPADM

## 2024-08-14 RX ORDER — ACETAMINOPHEN 325 MG/1
650 TABLET ORAL EVERY 6 HOURS PRN
Status: DISCONTINUED | OUTPATIENT
Start: 2024-08-14 | End: 2024-08-23 | Stop reason: HOSPADM

## 2024-08-14 RX ORDER — IBUPROFEN 200 MG
24 TABLET ORAL
Status: DISCONTINUED | OUTPATIENT
Start: 2024-08-14 | End: 2024-08-23 | Stop reason: HOSPADM

## 2024-08-14 RX ORDER — INSULIN ASPART 100 [IU]/ML
0-10 INJECTION, SOLUTION INTRAVENOUS; SUBCUTANEOUS 2 TIMES DAILY PRN
Status: DISCONTINUED | OUTPATIENT
Start: 2024-08-14 | End: 2024-08-23 | Stop reason: HOSPADM

## 2024-08-14 RX ORDER — IBUPROFEN 200 MG
1 TABLET ORAL
Status: DISCONTINUED | OUTPATIENT
Start: 2024-08-14 | End: 2024-08-14 | Stop reason: HOSPADM

## 2024-08-14 RX ORDER — GLUCAGON 1 MG
1 KIT INJECTION
Status: DISCONTINUED | OUTPATIENT
Start: 2024-08-14 | End: 2024-08-23 | Stop reason: HOSPADM

## 2024-08-14 RX ORDER — HYDROXYZINE PAMOATE 25 MG/1
50 CAPSULE ORAL EVERY 6 HOURS PRN
Status: DISCONTINUED | OUTPATIENT
Start: 2024-08-14 | End: 2024-08-19

## 2024-08-14 RX ORDER — LORAZEPAM 1 MG/1
1 TABLET ORAL EVERY 6 HOURS PRN
Status: DISCONTINUED | OUTPATIENT
Start: 2024-08-14 | End: 2024-08-19

## 2024-08-14 RX ORDER — IBUPROFEN 200 MG
16 TABLET ORAL
Status: DISCONTINUED | OUTPATIENT
Start: 2024-08-14 | End: 2024-08-23 | Stop reason: HOSPADM

## 2024-08-14 RX ORDER — IBUPROFEN 400 MG/1
400 TABLET ORAL EVERY 6 HOURS PRN
Status: DISCONTINUED | OUTPATIENT
Start: 2024-08-14 | End: 2024-08-15

## 2024-08-14 RX ADMIN — NICOTINE 1 PATCH: 21 PATCH, EXTENDED RELEASE TRANSDERMAL at 11:08

## 2024-08-14 RX ADMIN — LORAZEPAM 1 MG: 1 TABLET ORAL at 08:08

## 2024-08-14 NOTE — ED NOTES
Resp even and unlabored - slight movement to feet an hands (purposeful), fly at bedside (leaving for the night) - sitter remains in direct line of site  - QRS 72ms

## 2024-08-14 NOTE — ED NOTES
Venlafaxine HCL 75mg filled 7/9/2024, 33 in bottle, approx 23 unaccounted for - Poison Control, Emani: Monitor for 12-18 hours until asymptomatic -  watch for QRS widening, >100 tx with BiCarb - Charcoal by mouth if pt awake and alert - monitor for CNS depression , check ABG to keep pH 7.45-7.55 - monitor for Hyperthermia - Seizures and Agitation tx with Benzos and check CPK -

## 2024-08-14 NOTE — NURSING
"Admission Note:    Magda Wahl is a 63 y.o. female, : 1960, MRN: [id number], admitted on 2024 to [facility] ([facility]) for Luis Velazquez MD with a diagnosis of Depression with suicidal ideation [F32.A, R45.851]. Patient admitted on a status of Physician Emergency Certificate (PEC). Magda reports the following allergies:.Codeine, Fluoxetine and Olanzapine.    Magda demonstrated an affect that was sad and flat. She admits that she "tried to commit suicide, took pills with vodka". Then she stated "I wanted to stop thinking not take my life". She denies past suicide attempts as well as suicidal thoughts now. She reports many stressors including financial issues, relationship issues with a sibling who won't let her see their 93 y/o mother and having a 58 y/o son who is physically disabled. Her mood during assessment that was depressed and her appearance was unkempt and disheveled.  She denies hallucinations. She has one past [facility] hospitalization back in the s which she said was for a "mental break". Her medications are prescribed by Ella Schneider NP. She denies past or present use of substances including alcohol. She has a poor sleep pattern, sleeping only 3-4 hours a night. No history of trauma or abuse.    Magda's  height is 5' 3" (1.6 m) and weight is 60.5 kg (133 lb 6.1 oz). Her oral temperature is 98.5 °F (36.9 °C). Her blood pressure is 126/72 and her pulse is 84. Her respiration is 16 and oxygen saturation is 97%. Magda's last BM was noted on 24.    Metal detector screening performed via security personnel. The result of the scan was negative. Head-to-toe physical assessment and skin assessment completed with the following findings:Scars to abdomen from cholecystectomy, above right knee and to right lower leg. Bilateral heel dry and callused with fungus to bilateral great toes. Otherwise skin intact.    Magda is  with 3 grown children and 7 grandchildren. She " is disabled from a back injury in 2016 while working at [employer]. Medically she has DM, Hypothyroidism, Hyperlipidemia, COPD and sleep apnea. She had a Cholecystectomy in 1980 and tubal ligation in 1982. She smokes 1/2 to a pack of cigarettes a day and has a nicotine patch in place.     Magda was oriented to unit, staff, peers, and room. Patient belongings/valuables stored in locked intake room cabinet and changes of clothing provided to patient. Magda was placed on Q 15 min observations with suicide precautions. Her  Eamon Wahl was notified per her consent.

## 2024-08-14 NOTE — ED PROVIDER NOTES
Encounter Date: 8/13/2024       History     Chief Complaint   Patient presents with    Suicide Attempt     EMS: within last hour PT drank Vodka and took unk number of Venlafaxine HCL ER 75mg in attempt to harm herself  per FLY- she did nod in agreement when asked about SI in route - NS 500ml NS bolus given with 18g sl to left AC PTA     Patient seen on assumption of care from Dr. Vazquez.  Patient stable at time of assumption of care.  Patient initially evaluated for suicide attempt taking several doses of Effexor with alcohol intoxication.  Patient is without complaint at present.    The history is provided by the patient.     Review of patient's allergies indicates:   Allergen Reactions    Fluoxetine      Other reaction(s): Hearing voices    Codeine Hallucinations     Huron voices      Olanzapine Hives     Past Medical History:   Diagnosis Date    Bipolar disorder 07/03/2023    Allergy to zyprexa, abilify ineffective. Last took vraylar 1.5- has not filled in 9 months, declines medication     Chronic venous insufficiency 08/20/2018    Degenerative disc disease, lumbar 07/03/2023    Fall in 2010 at work, worked up by Dr. Soto and workman's comp.  Deemed disabled.  Completed injections and PT in the past. Did not tolerate gabapentin, now takes lyrica only once daily PRN, filled per Dr. Mendoza.     Depressive disorder 07/03/2023    Last filled cymbalta sept 2022    History of psychiatric hospitalization     Hx of psychiatric care     Hypothyroidism 07/03/2023    Gaps in fill hx but no dose adjustment in over a year. Continue 88mcg.     Mild chronic obstructive pulmonary disease 07/03/2023 2018 pft showed mild restriction. Patient did not do PFT in 2022 as ordered.     Mitral valve insufficiency 07/03/2023    Taking plavix    Mixed hyperlipidemia 07/03/2023    Last prescribed rosuvastatin 40 per Dr. Medrano, gaps in fill hx,     Obstructive sleep apnea syndrome 07/03/2023    Improved with CPAP    Psychiatric problem      Sleep difficulties     Tobacco user 07/03/2023    Down to 1/2 ppd. Declines medication     Type 2 diabetes mellitus 07/03/2023    Established with Dr. Franklin for vision.  Last prescribed metformin 1000 daily. Stopped d/t side effects. Begin ozempic    Vitamin D deficiency 07/03/2023     Past Surgical History:   Procedure Laterality Date    CHOLECYSTECTOMY      COLONOSCOPY      Debbie Mccann MD    EYE SURGERY      TUBAL LIGATION       Family History   Problem Relation Name Age of Onset    Arthritis Mother [place] Isnohemy     Cancer Mother Amirah Isnohemy     Diabetes Mother Amirah Isnohemy     Cancer Father RJ Isnohemy     Diabetes Father SHANDA Isnohemy     Hearing loss Father SHANDA Isnohemy     Depression Sister Magda Wahl      Social History     Tobacco Use    Smoking status: Every Day     Current packs/day: 0.50     Average packs/day: 0.5 packs/day for 49.6 years (24.8 ttl pk-yrs)     Types: Cigarettes     Start date: 1975     Passive exposure: Current    Smokeless tobacco: Never   Substance Use Topics    Alcohol use: Not Currently    Drug use: Never     Review of Systems   Constitutional: Negative.    HENT: Negative.     Eyes: Negative.    Respiratory: Negative.     Cardiovascular: Negative.    Gastrointestinal: Negative.    Endocrine: Negative.    Genitourinary: Negative.    Musculoskeletal: Negative.    Skin: Negative.    Allergic/Immunologic: Negative.    Neurological: Negative.    Hematological: Negative.    Psychiatric/Behavioral:  Positive for behavioral problems, dysphoric mood and suicidal ideas. The patient is nervous/anxious.        Physical Exam     Initial Vitals [08/13/24 2031]   BP Pulse Resp Temp SpO2   (!) 173/84 80 20 97.6 °F (36.4 °C) 96 %      MAP       --         Physical Exam    Constitutional: She appears well-developed and well-nourished. She is not diaphoretic. No distress.   HENT:   Head: Normocephalic and atraumatic.   Mouth/Throat: No oropharyngeal exudate.   Eyes: EOM are normal. Pupils are equal, round, and  reactive to light. Right eye exhibits no discharge. Left eye exhibits no discharge. No scleral icterus.   Neck: Neck supple. No thyromegaly present. No tracheal deviation present. No JVD present.   Normal range of motion.  Cardiovascular:  Normal rate, regular rhythm and normal heart sounds.     Exam reveals no gallop and no friction rub.       No murmur heard.  Pulmonary/Chest: Breath sounds normal. No stridor. No respiratory distress. She has no wheezes. She has no rhonchi. She has no rales.   Abdominal: Abdomen is soft. Bowel sounds are normal. She exhibits no distension. There is no abdominal tenderness. There is no rebound and no guarding.   Musculoskeletal:         General: Normal range of motion.      Cervical back: Normal range of motion and neck supple.     Neurological: She is alert and oriented to person, place, and time. She has normal strength and normal reflexes. She displays normal reflexes. No cranial nerve deficit or sensory deficit. GCS score is 15. GCS eye subscore is 4. GCS verbal subscore is 5. GCS motor subscore is 6.   Skin: Skin is warm.   Psychiatric:   Suicidal         ED Course   Procedures  Labs Reviewed   COMPREHENSIVE METABOLIC PANEL - Abnormal       Result Value    Sodium 138      Potassium 3.6      Chloride 107      CO2 20 (*)     Glucose 214 (*)     Blood Urea Nitrogen 5 (*)     Creatinine 0.64 (*)     Calcium 9.3      Protein Total 7.4      Albumin 4.4      Globulin 3.0      Albumin/Globulin Ratio 1.5      Bilirubin Total 0.4      ALP 90      ALT 31      AST 33      eGFR >90      Anion Gap 11.0      BUN/Creatinine Ratio 8 (*)    TSH - Abnormal    TSH 5.060 (*)    URINALYSIS, REFLEX TO URINE CULTURE - Abnormal    Color, UA Yellow      Appearance, UA Clear      Specific Gravity, UA <=1.005      pH, UA 6.0      Protein, UA Negative      Glucose, UA Negative      Ketones, UA Negative      Blood, UA Small (*)     Bilirubin, UA Negative      Urobilinogen, UA 0.2      Nitrites, UA Negative       Leukocyte Esterase, UA Negative      Narrative:      URINE STABILITY IS 2 HOURS AT ROOM TEMP OR    SIX HOURS REFRIGERATED. PERFORMING TESTING ON    SPECIMENS GREATER THAN THIS AGE MAY AFFECT THE    FOLLOWING TESTS:    PH          SPECIFIC GRAVITY           BLOOD    CLARITY     BILIRUBIN               UROBILINOGEN   ALCOHOL,MEDICAL (ETHANOL) - Abnormal    Ethanol Level 97.0 (*)     Alcohol, Legal Level 0.097 (*)    ACETAMINOPHEN LEVEL - Abnormal    Acetaminophen Level <10.0 (*)    CBC WITH DIFFERENTIAL - Abnormal    WBC 8.11      RBC 4.76      Hgb 14.3      Hct 42.0      MCV 88.2      MCH 30.0      MCHC 34.0      RDW 12.3      Platelet 292      MPV 10.3      Neut % 75.8 (*)     Lymph % 17.5 (*)     Mono % 5.2      Eos % 0.4 (*)     Basophil % 0.7      Lymph # 1.42      Neut # 6.15 (*)     Mono # 0.42 (*)     Eos # 0.03 (*)     Baso # 0.06      IG# 0.03      IG% 0.4      NRBC% 0.0     DRUG SCREEN, URINE (BEAKER) - Normal    Amphetamines, Urine Negative      Barbiturates, Urine Negative      Benzodiazepine, Urine Negative      Cannabinoids, Urine Negative      Cocaine, Urine Negative      Opiates, Urine Negative      Phencyclidine, Urine Negative      Methadone, Urine Negative      pH, Urine 6.0      Narrative:     Cut off concentrations:    Amphetamines - 1000 ng/ml  Barbiturates - 200 ng/ml  Benzodiazepine - 200 ng/ml  Cannabinoids (THC) - 50 ng/ml  Cocaine - 300 ng/ml  Fentanyl - 1.0 ng/ml  MDMA - 500 ng/ml  Opiates - 300 ng/ml   Phencyclidine (PCP) - 25 ng/ml  Methadone - 300 ng/ml      False negatives may result form substances such as bleach added to urine.  False positives may result for the presence of a substance with similar chemical structure to the drug or its metabolite.    This test provides only a PRELIMINARY analytical test result. A more specific alternate chemical method must be used in order to obtain a confirmed analytical result. Gas chromatography/mass spectrometry (GC/MS) is the preferred  confirmatory method. Other chemical confirmation methods are available. Clinical consideration and professional judgement should be applied to any drug of abuse test result, particularly when preliminary positive results are used.    Positive results will be confirmed only at the physicians request. Unconfirmed screening results are to be used only for medical purposes (treatment).          SARS-COV-2 RNA AMPLIFICATION, QUAL - Normal    SARS COV-2 Molecular Negative      Narrative:     The IDNOW COVID-19 assay is a rapid molecular in vitro diagnostic test utilizing an isothermal nucleic acid amplification technology intended for the qualitative detection of nucleic acid from the SARS-CoV-2 viral RNA in direct nasal, nasopharyngeal or throat swabs from individuals who are suspected of COVID-19 by their healthcare provider.   URINALYSIS, MICROSCOPIC - Normal    Bacteria, UA None Seen      RBC, UA 0-2      WBC, UA 0-2      Squamous Epithelial Cells, UA Rare     CBC W/ AUTO DIFFERENTIAL    Narrative:     The following orders were created for panel order CBC auto differential.  Procedure                               Abnormality         Status                     ---------                               -----------         ------                     CBC with Differential[[id number]]       Abnormal            Final result                 Please view results for these tests on the individual orders.        ECG Results              EKG 12-lead (Final result)        Collection Time Result Time QRS Duration OHS QTC Calculation    08/13/24 20:36:10 08/14/24 11:50:00 82 453                     Final result by Interface, Lab In [facility] (08/14/24 11:50:06)                   Narrative:    Test Reason : Z00.8,    Vent. Rate : 075 BPM     Atrial Rate : 075 BPM     P-R Int : 154 ms          QRS Dur : 082 ms      QT Int : 406 ms       P-R-T Axes : 041 054 068 degrees     QTc Int : 453 ms    Normal sinus rhythm  Anterior infarct ,age  undetermined  Abnormal ECG  No previous ECGs available  Confirmed by Donato Patten MD ([id number]) on 8/14/2024 11:49:53 AM    Referred By: [email or web address]   SELF           Confirmed By:Donato Patten MD                      Wet Read by Angel Palma MD (08/13/24 20:41:44, [facility], Emergency Medicine)    Normal sinus rhythm, heart rate 75, normal intervals, normal axis, normal P waves, normal QRS, normal ST segments, normal T-waves, normal QT.                                  Imaging Results    None          Medications   sodium chloride 0.9% bolus 1,000 mL 1,000 mL (0 mLs Intravenous Stopped 8/14/24 0204)     Medical Decision Making  Amount and/or Complexity of Data Reviewed  Labs: ordered.  Discussion of management or test interpretation with external provider(s): Poison control recommends supportive care, and observing patient for at least 12 hours.    0000:  Patient awake and alert, in no apparent distress.  Vital signs are stable.                           Patient accepted by [facility]     Patient is medically cleared for transfer      Clinical Impression:  Final diagnoses:  [Z00.8] Medical clearance for psychiatric admission  [T50.902A] Intentional overdose, initial encounter  [F10.920] Alcoholic intoxication without complication  [T50.902A] Suicide attempt by drug ingestion, initial encounter (Primary)          ED Disposition Condition    Transfer to Psych Facility Stable          ED Prescriptions    None       Follow-up Information    None          Donovan Moore MD  08/14/24 0751       Donovan Moore MD  08/14/24 0842       Donovan Moore MD  08/16/24 [id number]

## 2024-08-14 NOTE — ED NOTES
All Jewelry given to spouse.   In and out straight catheter done for urinalysis. Eugene Hollingsworth at bedside for assistance.

## 2024-08-14 NOTE — ED NOTES
Fly at bedside , report pt is shaking head appropriately when asked questions, pt will nt respond to staff

## 2024-08-14 NOTE — ED NOTES
Pt awakens to verbal stimuli - oriented x 4 - given water for hydration, lm well - VILLALOBOS well - bilat grasp strong - no s/s distress, no neuro deficits

## 2024-08-15 PROBLEM — R45.851 SUICIDE IDEATION: Status: ACTIVE | Noted: 2024-08-15

## 2024-08-15 PROBLEM — F32.A DEPRESSION WITH SUICIDAL IDEATION: Status: ACTIVE | Noted: 2024-08-15

## 2024-08-15 PROBLEM — F33.2 SEVERE EPISODE OF RECURRENT MAJOR DEPRESSIVE DISORDER, WITHOUT PSYCHOTIC FEATURES: Status: ACTIVE | Noted: 2024-08-15

## 2024-08-15 PROBLEM — R45.851 DEPRESSION WITH SUICIDAL IDEATION: Status: RESOLVED | Noted: 2024-08-15 | Resolved: 2024-08-15

## 2024-08-15 PROBLEM — F32.A DEPRESSION WITH SUICIDAL IDEATION: Status: RESOLVED | Noted: 2024-08-15 | Resolved: 2024-08-15

## 2024-08-15 PROBLEM — R45.851 DEPRESSION WITH SUICIDAL IDEATION: Status: ACTIVE | Noted: 2024-08-15

## 2024-08-15 LAB
CHOLEST SERPL-MCNC: 150 MG/DL
CHOLEST/HDLC SERPL: 3 {RATIO} (ref 0–5)
EST. AVERAGE GLUCOSE BLD GHB EST-MCNC: 151.3 MG/DL
GLUCOSE P FAST SERPL-MCNC: 104 MG/DL (ref 70–100)
HBA1C MFR BLD: 6.9 %
HDLC SERPL-MCNC: 54 MG/DL (ref 35–60)
LDLC SERPL CALC-MCNC: 82 MG/DL (ref 50–140)
POCT GLUCOSE: 110 MG/DL (ref 70–110)
POCT GLUCOSE: 121 MG/DL (ref 70–110)
T PALLIDUM AB SER QL: NONREACTIVE
TRIGL SERPL-MCNC: 70 MG/DL (ref 37–140)
VLDLC SERPL CALC-MCNC: 14 MG/DL

## 2024-08-15 PROCEDURE — 36415 COLL VENOUS BLD VENIPUNCTURE: CPT | Performed by: PSYCHIATRY & NEUROLOGY

## 2024-08-15 PROCEDURE — 82947 ASSAY GLUCOSE BLOOD QUANT: CPT | Performed by: PSYCHIATRY & NEUROLOGY

## 2024-08-15 PROCEDURE — 80061 LIPID PANEL: CPT | Performed by: PSYCHIATRY & NEUROLOGY

## 2024-08-15 PROCEDURE — S4991 NICOTINE PATCH NONLEGEND: HCPCS | Performed by: PSYCHIATRY & NEUROLOGY

## 2024-08-15 PROCEDURE — 11400000 HC PSYCH PRIVATE ROOM

## 2024-08-15 PROCEDURE — 25000003 PHARM REV CODE 250: Performed by: PSYCHIATRY & NEUROLOGY

## 2024-08-15 PROCEDURE — 83036 HEMOGLOBIN GLYCOSYLATED A1C: CPT | Performed by: PSYCHIATRY & NEUROLOGY

## 2024-08-15 PROCEDURE — 25000003 PHARM REV CODE 250: Performed by: INTERNAL MEDICINE

## 2024-08-15 PROCEDURE — 86780 TREPONEMA PALLIDUM: CPT | Performed by: PSYCHIATRY & NEUROLOGY

## 2024-08-15 RX ORDER — IBUPROFEN 400 MG/1
400 TABLET ORAL EVERY 6 HOURS PRN
Status: DISCONTINUED | OUTPATIENT
Start: 2024-08-15 | End: 2024-08-23 | Stop reason: HOSPADM

## 2024-08-15 RX ORDER — VENLAFAXINE HYDROCHLORIDE 75 MG/1
75 CAPSULE, EXTENDED RELEASE ORAL DAILY
Status: DISCONTINUED | OUTPATIENT
Start: 2024-08-15 | End: 2024-08-15

## 2024-08-15 RX ORDER — CLOPIDOGREL BISULFATE 75 MG/1
75 TABLET ORAL DAILY
Status: DISCONTINUED | OUTPATIENT
Start: 2024-08-16 | End: 2024-08-23 | Stop reason: HOSPADM

## 2024-08-15 RX ORDER — LEVOTHYROXINE SODIUM 88 UG/1
88 TABLET ORAL EVERY MORNING
Status: DISCONTINUED | OUTPATIENT
Start: 2024-08-16 | End: 2024-08-17

## 2024-08-15 RX ORDER — IBUPROFEN 200 MG
1 TABLET ORAL DAILY
Status: DISCONTINUED | OUTPATIENT
Start: 2024-08-15 | End: 2024-08-23 | Stop reason: HOSPADM

## 2024-08-15 RX ORDER — VENLAFAXINE HYDROCHLORIDE 150 MG/1
150 CAPSULE, EXTENDED RELEASE ORAL DAILY
Status: DISCONTINUED | OUTPATIENT
Start: 2024-08-16 | End: 2024-08-23 | Stop reason: HOSPADM

## 2024-08-15 RX ORDER — PREGABALIN 150 MG/1
150 CAPSULE ORAL 2 TIMES DAILY
Status: DISCONTINUED | OUTPATIENT
Start: 2024-08-15 | End: 2024-08-23 | Stop reason: HOSPADM

## 2024-08-15 RX ORDER — ATORVASTATIN CALCIUM 40 MG/1
80 TABLET, FILM COATED ORAL DAILY
Status: DISCONTINUED | OUTPATIENT
Start: 2024-08-16 | End: 2024-08-23 | Stop reason: HOSPADM

## 2024-08-15 RX ORDER — MONTELUKAST SODIUM 10 MG/1
10 TABLET ORAL NIGHTLY
Status: DISCONTINUED | OUTPATIENT
Start: 2024-08-15 | End: 2024-08-23 | Stop reason: HOSPADM

## 2024-08-15 RX ORDER — FAMOTIDINE 20 MG/1
20 TABLET, FILM COATED ORAL DAILY
Status: DISCONTINUED | OUTPATIENT
Start: 2024-08-15 | End: 2024-08-23 | Stop reason: HOSPADM

## 2024-08-15 RX ADMIN — PREGABALIN 150 MG: 150 CAPSULE ORAL at 08:08

## 2024-08-15 RX ADMIN — FAMOTIDINE 20 MG: 20 TABLET ORAL at 04:08

## 2024-08-15 RX ADMIN — NICOTINE 1 PATCH: 21 PATCH, EXTENDED RELEASE TRANSDERMAL at 08:08

## 2024-08-15 RX ADMIN — MONTELUKAST 10 MG: 10 TABLET, FILM COATED ORAL at 08:08

## 2024-08-15 RX ADMIN — VENLAFAXINE HYDROCHLORIDE 75 MG: 75 CAPSULE, EXTENDED RELEASE ORAL at 11:08

## 2024-08-15 NOTE — NURSING
"Daily Nursing Note:      Behavior:  Patient (Magda Wahl is a 63 y.o. female, : 1960, MRN: [id number]) demonstrating an affect that was sad and flat. Magda demonstrating mood that is depressed and anxious. Magda had an appearance that was clean. Magda denies suicidal ideation. Magda denies suicide plan. Magda denies homicidal ideation. Magda denies hallucinations.    Magda's  height is 5' 3" (1.6 m) and weight is 60.5 kg (133 lb 6.1 oz). Her oral temperature is 98.5 °F (36.9 °C). Her blood pressure is 126/72 and her pulse is 84. Her respiration is 16 and oxygen saturation is 97%. Magda's last BM was noted on 2024.    Intervention:  Encouraged Magda to perform self-hygiene, grooming, and changing of clothing. Monitored Magda's behavior and program compliance. Monitored Magda for suicidal ideation, homicidal ideation, sleep disturbance, and hallucinations. Encouraged Magda to eat all portions of meals and assesse for meal preferences. Monitored Magda for intake and output to ensure hydration. Will notify the Physician for any medication refusal and any change in patient condition.      Response:  Magda verbalizes "Very little" anxiety and depression.  States "I wasn't trying to kill myself, I was just trying to make my brain stop."  Denies pain or discomfort but states she does have lower back pain at times which is relieved by ibuprofen.  Compliant with medication regimen.      Plan:   Continue to monitor per MD orders; maintain patient safety with safety checks Q15 minutes and prn.   "

## 2024-08-15 NOTE — SUBJECTIVE & OBJECTIVE
Patient History               Medical as of 8/15/2024       Past Medical History       Diagnosis Date Comments Source    Bipolar disorder 7/3/2023 Allergy to zyprexa, abilify ineffective. Last took vraylar 1.5- has not filled in 9 months, declines medication  Provider    Chronic venous insufficiency 8/20/2018 -- Provider    Degenerative disc disease, lumbar 7/3/2023 Fall in 2010 at work, worked up by Dr. Soto and workman's comp.  Deemed disabled.  Completed injections and PT in the past. Did not tolerate gabapentin, now takes lyrica only once daily PRN, filled per Dr. Mendoza.  Provider    Depressive disorder 7/3/2023 Last filled cymbalta sept 2022 Provider    Hypothyroidism 7/3/2023 Gaps in fill hx but no dose adjustment in over a year. Continue 88mcg.  Provider    Mild chronic obstructive pulmonary disease 7/3/2023 2018 pft showed mild restriction. Patient did not do PFT in 2022 as ordered.  Provider    Mitral valve insufficiency 7/3/2023 Taking plavix Provider    Mixed hyperlipidemia 7/3/2023 Last prescribed rosuvastatin 40 per Dr. Medrano, gaps in fill hx,  Provider    Obstructive sleep apnea syndrome 7/3/2023 Improved with CPAP Provider    Tobacco user 7/3/2023 Down to 1/2 ppd. Declines medication  Provider    Type 2 diabetes mellitus 7/3/2023 Established with Dr. Franklin for vision.  Last prescribed metformin 1000 daily. Stopped d/t side effects. Begin ozempic Provider    Vitamin D deficiency 7/3/2023 -- Provider                          Surgical as of 8/15/2024       Past Surgical History       Procedure Laterality Date Comments Source    COLONOSCOPY -- -- Debbie Mccann MD Provider    EYE SURGERY -- -- -- Patient    TUBAL LIGATION -- -- -- Patient    CHOLECYSTECTOMY -- -- -- Patient                          Family as of 8/15/2024       Problem Relation Name Age of Onset Comments Source    Arthritis Mother Amirah Herring -- -- Patient    Cancer Mother Amirah Herring -- -- Patient    Diabetes Mother Amirah Herring --  -- Patient    Cancer Father SHANDA Herring -- -- Patient    Diabetes Father SHANDA Herring -- -- Patient    Hearing loss Father SHANDA Herring -- -- Patient    Depression Sister Magda Wahl -- -- Patient                  Tobacco Use as of 8/15/2024       Smoking Status Smoking Start Date Last Attempt to Quit Current Packs/Day Average Packs/Day    Every Day 1975 -- 0.5 0.5 packs/day for 49.6 years (24.8 ttl pk-yrs)   Pack Year History   Packs/Day From To Years    0.5 1975 -- 49.6      Passive Exposure    Current      Smokeless Status Smokeless Type Smokeless Quit Date    Never -- --      Source    Provider                  Alcohol Use as of 8/15/2024       Alcohol Use Drinks/Week Alcohol/Week Comments Source    Not Currently 0 Glasses of wine  0 Cans of beer  0 Shots of liquor  0 Drinks containing 0.5 oz of alcohol -- -- Patient                  Drug Use as of 8/15/2024       Drug Use Types Frequency Comments Source    Never -- -- -- Patient                  Sexual Activity as of 8/15/2024       Sexually Active Birth Control Partners Comments Source    Never -- -- -- Patient                  Activities of Daily Living as of 8/15/2024    None               Social Documentation as of 8/15/2024    None               Occupational as of 8/15/2024    None               Socioeconomic as of 8/15/2024       Marital Status Spouse Name Number of Children Years Education Education Level Preferred Language Ethnicity Race Source     -- -- -- -- English Not  or /a White --                  Pertinent History       Question Response Comments    Lives with -- --    Place in Birth Order -- --    Lives in -- --    Number of Siblings -- --    Raised by -- --    Legal Involvement -- --    Childhood Trauma -- --    Criminal History of -- --    Financial Status -- --    Highest Level of Education -- --    Does patient have access to a firearm? -- --     Service -- --    Primary Leisure Activity -- --    Spirituality -- --           Past Medical History:   Diagnosis Date    Bipolar disorder 7/3/2023    Allergy to zyprexa, abilify ineffective. Last took vraylar 1.5- has not filled in 9 months, declines medication     Chronic venous insufficiency 8/20/2018    Degenerative disc disease, lumbar 7/3/2023    Fall in 2010 at work, worked up by Dr. Soto and workman's comp.  Deemed disabled.  Completed injections and PT in the past. Did not tolerate gabapentin, now takes lyrica only once daily PRN, filled per Dr. Mendoza.     Depressive disorder 7/3/2023    Last filled cymbalta sept 2022    Hypothyroidism 7/3/2023    Gaps in fill hx but no dose adjustment in over a year. Continue 88mcg.     Mild chronic obstructive pulmonary disease 7/3/2023    2018 pft showed mild restriction. Patient did not do PFT in 2022 as ordered.     Mitral valve insufficiency 7/3/2023    Taking plavix    Mixed hyperlipidemia 7/3/2023    Last prescribed rosuvastatin 40 per Dr. Medrano, gaps in fill hx,     Obstructive sleep apnea syndrome 7/3/2023    Improved with CPAP    Tobacco user 7/3/2023    Down to 1/2 ppd. Declines medication     Type 2 diabetes mellitus 7/3/2023    Established with Dr. Franklin for vision.  Last prescribed metformin 1000 daily. Stopped d/t side effects. Begin ozempic    Vitamin D deficiency 7/3/2023     Past Surgical History:   Procedure Laterality Date    CHOLECYSTECTOMY      COLONOSCOPY      Debbie Mccann MD    EYE SURGERY      TUBAL LIGATION       Family History       Problem Relation (Age of Onset)    Arthritis Mother    Cancer Mother, Father    Depression Sister    Diabetes Mother, Father    Hearing loss Father          Tobacco Use    Smoking status: Every Day     Current packs/day: 0.50     Average packs/day: 0.5 packs/day for 49.6 years (24.8 ttl pk-yrs)     Types: Cigarettes     Start date: 1975     Passive exposure: Current    Smokeless tobacco: Never   Substance and Sexual Activity    Alcohol use: Not Currently    Drug use: Never    Sexual  activity: Never     Review of patient's allergies indicates:   Allergen Reactions    Fluoxetine      Other reaction(s): Hearing voices    Codeine Hallucinations     [place] voices      Olanzapine Hives       No current facility-administered medications on file prior to encounter.     Current Outpatient Medications on File Prior to Encounter   Medication Sig    ARIPiprazole (ABILIFY) 2 MG Tab Take 5 mg by mouth once daily.    busPIRone (BUSPAR) 10 MG tablet Take 1 tablet (10 mg total) by mouth once daily.    clopidogreL (PLAVIX) 75 mg tablet Take 1 tablet (75 mg total) by mouth once daily.    doxepin (SILENOR) 3 mg Tab Take 3 mg by mouth every evening.    exenatide (BYETTA) 5 mcg/dose (250 mcg/mL) 1.2 mL injection Inject 0.02 mLs (5 mcg total) into the skin 2 (two) times daily with meals.    famotidine (PEPCID) 20 MG tablet Take 20 mg by mouth Daily.    levothyroxine (SYNTHROID) 88 MCG tablet Take 1 tablet (88 mcg total) by mouth every morning.    montelukast (SINGULAIR) 10 mg tablet Take 10 mg by mouth every evening.    pregabalin (LYRICA) 150 MG capsule Take 150 mg by mouth 2 (two) times daily.    rosuvastatin (CRESTOR) 40 MG Tab Take 1 tablet (40 mg total) by mouth Daily.    venlafaxine (EFFEXOR-XR) 75 MG 24 hr capsule Take 1 capsule (75 mg total) by mouth once daily.    azelastine (ASTELIN) 137 mcg (0.1 %) nasal spray 1 spray by Nasal route 2 (two) times daily.    blood sugar diagnostic Strp To check BG 1 times daily for NIDDM, to use with insurance preferred meter    blood-glucose meter kit To check BG 1 times daily, to use with insurance preferred meter    cariprazine (VRAYLAR) 1.5 mg Cap Take 1 capsule (1.5 mg total) by mouth once daily. (Patient not taking: Reported on 8/14/2024)    lancets Misc To check BG 1 times daily for NIDDM, to use with insurance preferred meter     Psychotherapeutics (From admission, onward)      Start     Stop Route Frequency Ordered    08/15/24 1130  venlafaxine 24 hr capsule 75 mg     "     -- Oral Daily 08/15/24 1016    08/14/24 1725  LORazepam tablet 1 mg         -- Oral Every 6 hours PRN 08/14/24 1725          Review of Systems   Psychiatric/Behavioral:  Positive for decreased concentration, dysphoric mood, self-injury, sleep disturbance and suicidal ideas. The patient is nervous/anxious.      Strengths and Liabilities: Strength: Patient accepts guidance/feedback, Strength: Patient is intelligent., Liability: Patient is dependent., Liability: Patient has no suport network.    Objective:     Vital Signs (Most Recent):  Temp: 98.6 °F (37 °C) (08/15/24 1644)  Pulse: 82 (08/15/24 1644)  Resp: 16 (08/15/24 1644)  BP: 114/69 (08/15/24 1644)  SpO2: 98 % (08/15/24 1644) Vital Signs (24h Range):  Temp:  [98.5 °F (36.9 °C)-98.6 °F (37 °C)] 98.6 °F (37 °C)  Pulse:  [82-88] 82  Resp:  [16] 16  SpO2:  [97 %-98 %] 98 %  BP: (106-114)/(66-69) 114/69     Height: 5' 3" (160 cm)  Weight: 60.5 kg (133 lb 6.1 oz)  Body mass index is 23.63 kg/m².    No intake or output data in the 24 hours ending 08/15/24 1817       Physical Exam    Mental status examination:  64-year-old white female who affectively appears to be extremely constricted at this time.  Whose speech was good articulation and execution.  His thought processes this time were non spontaneous but when produced were linear.  His thought content demonstrated no clear evidence of any auditory or visual hallucinations acknowledged this time.  She made no statements to harm others.  She was continued ongoing thoughts of self-harm.  Her insight and judgment this time were deemed to be limited poor.  On cognitive evaluation she was alert and oriented to person, place, setting and time.  Immediate memory is 3/3 objects immediately.  1/3 objects 5 minutes.  Long-term memory appeared to be intact.  Fund of knowledge is deemed to be low average.  Patient was adequately abstract        Significant Labs: Last 72 Hours:   Recent Lab Results  (Last 5 results in the past " 72 hours)        08/15/24  1657   08/15/24  0543   08/15/24  0440   08/14/24  1728   08/13/24  2112        Phencyclidine         Negative       Amphetamines, Urine         Negative       Appearance, UA         Clear       Bacteria, UA         None Seen       Barbituates, Urine         Negative       Benzodiazepine, Urine         Negative       Bilirubin (UA)         Negative       Cannabinoids, Urine         Negative       Cholesterol Total     150           Cocaine, Urine         Negative       Color, UA         Yellow       Estimated Avg Glucose     151.3           Gluc Fast     104           Glucose, UA         Negative       HDL     54           Hemoglobin A1C External     6.9           Ketones, UA         Negative       LDL Cholesterol     82.00           Leukocyte Esterase, UA         Negative       Methadone Screen, Urine         Negative       NITRITE UA         Negative       Blood, UA         Small       Opiates, Urine         Negative       pH, UA         6.0       pH, Urine         6.0       POCT Glucose 110   121     84         Protein, UA         Negative       RBC, UA         0-2       Specific Gravity,UA         <=1.005       Squam Epithel, UA         Rare       Syphilis Antibody     Nonreactive           Total Cholesterol/HDL Ratio     3           Triglycerides     70           Urobilinogen, UA         0.2       Very Low Density Lipoprotein     14           WBC, UA         0-2                              Significant Imaging: None

## 2024-08-15 NOTE — HPI
65 yo female who presents post attempts to self injure.  Patient was this time he was events after she was gestures to self injure overdoses Effexor XR.  Patient was deemed so with the intention to end her life.      Patient was this time describes recent life stressor characterized by the followin.  Severe financial stress associated with being on disability in her  return   2. Marked instability overall mood  3.  Low self-worth self-esteem   4.  Preoccupation with past events.  5. Lack of interest in pleasurable activities   6.  Poor concentration   7. Ever present anxiety  8. Initial terminal insomnia      Patient was this time reports she was struggled most recently by struggling with finances, struggling with the raising adult disabled son, in conflict with sister over patient was 92-year-old mother.    Patient was has struggled at this time with the mood and continues to do so.      Patient was this time does has a history of past use of alcohol.    Patient was denies use of cannabis other substances.      Patient was this time when questioned about issues of trauma earlier in life denies.  She currently lives in [facility].  She was describes having no previous attempts to self injure.    Psychiatric history shows that patient was been seen on outpatient basis per provider.  Patient was states she was seen by mental health prescriber roughly every 3-6 months.  She was states she does have a therapist.    Medical surgical history he was benign     Medications: Patient was this time does not know the names of her medications.      Family history significant for significant depression on maternal side family.  Paternal grandfather reportedly shot himself.  He was describes no issues of schizophrenia in the family session.      Patient was does has a high school level education.  He was a father of 3 adults has 7 grandchildren.  Patient was highest grade education was 10th grade.  She was  states she was less goal because she got pregnant.    Patient was denies any prior history of legal difficulties.

## 2024-08-15 NOTE — CONSULTS
[facility] Hospital Medicine  Consult Note    Patient Name: Magda Wahl  MRN: [id number]  Admission Date: 8/14/2024  Hospital Length of Stay: 1 days  Attending Physician: Luis Velazquez MD   Primary Care Provider: Omar Barboza APRN   Consults: Kamila Hinkle DO - Hospitalist          Patient information was obtained from ER records.     Consults  Subjective:     Principal Problem: depression with SI    Chief Complaint: Depression with si       HPI: 63yo CF with PMH of venous insufficiency, hypothyroidism, HLD, DMT2, depression, gerd, tobacco use was admitted to [facility] with suicide attempt by overdose with effexor and alcohol. Pt denies sob, cp, trouble tasting or smelling. Labs and vitals reviewed.    Past Medical History:   Diagnosis Date    Bipolar disorder 7/3/2023    Allergy to zyprexa, abilify ineffective. Last took vraylar 1.5- has not filled in 9 months, declines medication     Chronic venous insufficiency 8/20/2018    Degenerative disc disease, lumbar 7/3/2023    Fall in 2010 at work, worked up by Dr. Soto and [employer].  Deemed disabled.  Completed injections and PT in the past. Did not tolerate gabapentin, now takes lyrica only once daily PRN, filled per Dr. Mendoza.     Depressive disorder 7/3/2023    Last filled cymbalta sept 2022    Hypothyroidism 7/3/2023    Gaps in fill hx but no dose adjustment in over a year. Continue 88mcg.     Mild chronic obstructive pulmonary disease 7/3/2023    2018 pft showed mild restriction. Patient did not do PFT in 2022 as ordered.     Mitral valve insufficiency 7/3/2023    Taking plavix    Mixed hyperlipidemia 7/3/2023    Last prescribed rosuvastatin 40 per Dr. Medrano, gaps in fill hx,     Obstructive sleep apnea syndrome 7/3/2023    Improved with CPAP    Tobacco user 7/3/2023    Down to 1/2 ppd. Declines medication     Type 2 diabetes mellitus 7/3/2023    Established with Dr. Franklin for vision.  Last prescribed metformin 1000  daily. Stopped d/t side effects. Begin ozempic    Vitamin D deficiency 7/3/2023       Past Surgical History:   Procedure Laterality Date    CHOLECYSTECTOMY      COLONOSCOPY      Debbie Mccann MD    EYE SURGERY      TUBAL LIGATION         Review of patient's allergies indicates:   Allergen Reactions    Fluoxetine      Other reaction(s): Hearing voices    Codeine Hallucinations     [place] voices      Olanzapine Hives       Current Facility-Administered Medications on File Prior to Encounter   Medication    [DISCONTINUED] nicotine 21 mg/24 hr 1 patch     Current Outpatient Medications on File Prior to Encounter   Medication Sig    ARIPiprazole (ABILIFY) 2 MG Tab Take 5 mg by mouth once daily.    busPIRone (BUSPAR) 10 MG tablet Take 1 tablet (10 mg total) by mouth once daily.    clopidogreL (PLAVIX) 75 mg tablet Take 1 tablet (75 mg total) by mouth once daily.    doxepin (SILENOR) 3 mg Tab Take 3 mg by mouth every evening.    exenatide (BYETTA) 5 mcg/dose (250 mcg/mL) 1.2 mL injection Inject 0.02 mLs (5 mcg total) into the skin 2 (two) times daily with meals.    famotidine (PEPCID) 20 MG tablet Take 20 mg by mouth Daily.    levothyroxine (SYNTHROID) 88 MCG tablet Take 1 tablet (88 mcg total) by mouth every morning.    montelukast (SINGULAIR) 10 mg tablet Take 10 mg by mouth every evening.    pregabalin (LYRICA) 150 MG capsule Take 150 mg by mouth 2 (two) times daily.    rosuvastatin (CRESTOR) 40 MG Tab Take 1 tablet (40 mg total) by mouth Daily.    venlafaxine (EFFEXOR-XR) 75 MG 24 hr capsule Take 1 capsule (75 mg total) by mouth once daily.    azelastine (ASTELIN) 137 mcg (0.1 %) nasal spray 1 spray by Nasal route 2 (two) times daily.    blood sugar diagnostic Strp To check BG 1 times daily for NIDDM, to use with insurance preferred meter    blood-glucose meter kit To check BG 1 times daily, to use with insurance preferred meter    cariprazine (VRAYLAR) 1.5 mg Cap Take 1 capsule (1.5 mg total) by mouth once daily.  (Patient not taking: Reported on 8/14/2024)    lancets Misc To check BG 1 times daily for NIDDM, to use with insurance preferred meter     Family History       Problem Relation (Age of Onset)    Arthritis Mother    Cancer Mother, Father    Depression Sister    Diabetes Mother, Father    Hearing loss Father          Tobacco Use    Smoking status: Every Day     Current packs/day: 0.50     Average packs/day: 0.5 packs/day for 49.6 years (24.8 ttl pk-yrs)     Types: Cigarettes     Start date: 1975     Passive exposure: Current    Smokeless tobacco: Never   Substance and Sexual Activity    Alcohol use: Not Currently    Drug use: Never    Sexual activity: Never     Review of Systems   Constitutional:  Negative for fever.   HENT: Negative.     Eyes: Negative.    Respiratory: Negative.  Negative for shortness of breath.    Cardiovascular:  Negative for chest pain.   Gastrointestinal:  Negative for abdominal distention, abdominal pain, nausea and vomiting.   Musculoskeletal:  Negative for back pain.   Neurological: Negative.    Psychiatric/Behavioral:  Positive for behavioral problems and dysphoric mood.      Objective:     Vital Signs (Most Recent):  Temp: 98.5 °F (36.9 °C) (08/15/24 0609)  Pulse: 88 (08/15/24 0609)  Resp: 16 (08/15/24 0609)  BP: 106/66 (08/15/24 0609)  SpO2: 97 % (08/15/24 0609) Vital Signs (24h Range):  Temp:  [98.5 °F (36.9 °C)] 98.5 °F (36.9 °C)  Pulse:  [84-88] 88  Resp:  [16] 16  SpO2:  [97 %] 97 %  BP: (106-126)/(66-72) 106/66     Weight: 60.5 kg (133 lb 6.1 oz)  Body mass index is 23.63 kg/m².     Physical Exam  Vitals and nursing note reviewed. Exam conducted with a chaperone present.   Constitutional:       General: She is not in acute distress.     Appearance: Normal appearance.   HENT:      Head: Normocephalic and atraumatic.      Nose: Nose normal.      Mouth/Throat:      Mouth: Mucous membranes are moist.   Eyes:      Extraocular Movements: Extraocular movements intact and EOM normal.       Conjunctiva/sclera: Conjunctivae normal.      Pupils: Pupils are equal, round, and reactive to light.   Cardiovascular:      Rate and Rhythm: Normal rate and regular rhythm.      Heart sounds: Normal heart sounds. No murmur heard.     No gallop.   Pulmonary:      Effort: Pulmonary effort is normal.      Breath sounds: Normal breath sounds. No wheezing, rhonchi or rales.   Musculoskeletal:         General: No swelling or deformity. Normal range of motion.      Cervical back: Normal range of motion and neck supple.   Skin:     General: Skin is warm and dry.   Neurological:      General: No focal deficit present.      Mental Status: She is alert.      Gait: Gait normal.   Psychiatric:         Attention and Perception: Attention normal.         Mood and Affect: Mood is depressed.         Speech: Speech normal.         Behavior: Behavior is cooperative.         CRANIAL NERVES     CN I  cranial nerve I not tested    CN II   Visual fields full to confrontation.     CN III, IV, VI   Pupils are equal, round, and reactive to light.  Extraocular motions are normal.   Right pupil: Accommodation: intact.   Left pupil: Accommodation: intact.   CN III: no CN III palsy  CN VI: no CN VI palsy    CN V   Facial sensation intact.   Right facial sensation deficit: none  Left facial sensation deficit: none    CN VII   Facial expression full, symmetric.   Right facial weakness: none  Left facial weakness: none    CN VIII   CN VIII normal.   Hearing: intact    CN IX, X   CN IX normal.   CN X normal.   Palate: symmetric    CN XI   CN XI normal.   Right sternocleidomastoid strength: normal  Left sternocleidomastoid strength: normal  Right trapezius strength: normal  Left trapezius strength: normal    CN XII   CN XII normal.   Tongue: not atrophic  Fasciculations: absent  Tongue deviation: none         Significant Labs: All pertinent labs within the past 24 hours have been reviewed.  Recent Lab Results         08/15/24  0440   08/14/24  4818         Cholesterol Total 150         Estimated Avg Glucose 151.3         Gluc Fast 104         HDL 54         Hemoglobin A1C External 6.9         LDL Cholesterol 82.00         POCT Glucose   84       Syphilis Antibody Nonreactive         Total Cholesterol/HDL Ratio 3         Triglycerides 70         Very Low Density Lipoprotein 14                 Significant Imaging: I have reviewed all pertinent imaging results/findings within the past 24 hours.  Assessment/Plan:     Depression with suicidal ideation  Mgt per psyc.  VS and labs reviewed and stable.        Type 2 diabetes mellitus with hyperglycemia, without long-term current use of insulin  ISS.      Other hyperlipidemia  Continue home meds.      Acquired hypothyroidism  Continue home meds.      Tobacco user  Rec cessation      Chronic venous insufficiency  Continue home meds.        VTE Risk Mitigation (From admission, onward)      None                Thank you for your consult. I will sign off. Please contact us if you have any additional questions.    NAZ LOWE,   Department of Hospital Medicine   [facility]

## 2024-08-15 NOTE — PATIENT CARE CONFERENCE
Spoke with pt's  Eamon.  He expressed concern for her and states he just wants her to get better.  He states he is aware of the family conflcits and tells her to just leave it alone but that is all he can do.

## 2024-08-15 NOTE — PATIENT CARE CONFERENCE
Pt's  called and advised that he recently discovered (today) that Magda had been withdrawing money from their bank account and going to [place] to [facility].  Pt states that over 800 has been taken out of the account.  That money was designated for rent.

## 2024-08-15 NOTE — PROGRESS NOTES
Recreation Therapy Assessment    1. MOOD: sad,flat, depressed and anxious    2. BEHAVIOR:cooperative     3. AFFECT:flat and low energy    4. COGNITION: alert in interview    5. MOTOR BEHAVIOR/SKILLS: ambulatory    6. SPEECH:normal    7. LEISURE FUNCTIONING: decline due to depression and anxiety in daily life.     8. LEISURE NEEDS: to regain leisure interests she use to enjoy.     9. PT EDUCATION LEVEL:  10 th grade pt states    10. WHAT IS THE BEST THING THAT EVER HAPPENED TO YOU? My children  pt states    11. THINGS THAT FRUSTRATE AND ANGER ME ARE: my depression,anxiety and mental block pt states. Also my sister will not let me see my mother. Pt states.     12. WHEN I GET ANGRY, I USUALLY: isolate and be depressed and anxious    13. MY RELATIONSHIP WITH MOST PEOPLE ARE: not good lately due to depression and anxiety    14. LEISURE INTERESTS/SKILLS: I use to enjoy doing art, music, movies , games,cook outs and visiting with family and friends. Pt states she also use to enjoy fishing and cooking.     15. LEISURE BARRIERS: depression and anxiety .    16. ASSESSMENT SUMMARY: pt  is a 64 year old white female. Pt is depressed ,anxious and mental block she states. Pt has decline in daily living skills.  Pt states she is close to 2 sisters and has 11 siblings. Pt lives with her  . Pt states she is disabled from a hurt back from working at [employer]. Pt states we have money problems and family problems. Pt states she is close to her children.       17. TX PLAN FOR RECREATION THERAPY: pt will receive rt services 2 x a day and 5 x a week with chad ADAMS. Pt will be assisted to complete 8 assignments on problem solving skills, emotional outlets,leisure skills, self worth and social skills. Pt will utilize art,music, cognitive games and exercise to achieve these goals. All to enhance positive problem solving skills, emotional outlets,social skills, self worth,and leisure skills at home once dc. Assist pt 1;1 as  needed to complete goals to the best of her ability.       18. SIGNATURE/CREDENTIALS:chad lewis MA CTRS                                                                      DATE:  8  15  2024                            TIME:8;42 am         RECREATION THERAPIST  DAY

## 2024-08-15 NOTE — SUBJECTIVE & OBJECTIVE
Past Medical History:   Diagnosis Date    Bipolar disorder 7/3/2023    Allergy to zyprexa, abilify ineffective. Last took vraylar 1.5- has not filled in 9 months, declines medication     Chronic venous insufficiency 8/20/2018    Degenerative disc disease, lumbar 7/3/2023    Fall in 2010 at work, worked up by Dr. Soto and workman's comp.  Deemed disabled.  Completed injections and PT in the past. Did not tolerate gabapentin, now takes lyrica only once daily PRN, filled per Dr. Mendoza.     Depressive disorder 7/3/2023    Last filled cymbalta sept 2022    Hypothyroidism 7/3/2023    Gaps in fill hx but no dose adjustment in over a year. Continue 88mcg.     Mild chronic obstructive pulmonary disease 7/3/2023    2018 pft showed mild restriction. Patient did not do PFT in 2022 as ordered.     Mitral valve insufficiency 7/3/2023    Taking plavix    Mixed hyperlipidemia 7/3/2023    Last prescribed rosuvastatin 40 per Dr. Medrano, gaps in fill hx,     Obstructive sleep apnea syndrome 7/3/2023    Improved with CPAP    Tobacco user 7/3/2023    Down to 1/2 ppd. Declines medication     Type 2 diabetes mellitus 7/3/2023    Established with Dr. Franklin for vision.  Last prescribed metformin 1000 daily. Stopped d/t side effects. Begin ozempic    Vitamin D deficiency 7/3/2023       Past Surgical History:   Procedure Laterality Date    CHOLECYSTECTOMY      COLONOSCOPY      Debbie Mccann MD    EYE SURGERY      TUBAL LIGATION         Review of patient's allergies indicates:   Allergen Reactions    Fluoxetine      Other reaction(s): Hearing voices    Codeine Hallucinations     [place]anche voices      Olanzapine Hives       Current Facility-Administered Medications on File Prior to Encounter   Medication    [DISCONTINUED] nicotine 21 mg/24 hr 1 patch     Current Outpatient Medications on File Prior to Encounter   Medication Sig    ARIPiprazole (ABILIFY) 2 MG Tab Take 5 mg by mouth once daily.    busPIRone (BUSPAR) 10 MG tablet Take 1 tablet (10 mg  total) by mouth once daily.    clopidogreL (PLAVIX) 75 mg tablet Take 1 tablet (75 mg total) by mouth once daily.    doxepin (SILENOR) 3 mg Tab Take 3 mg by mouth every evening.    exenatide (BYETTA) 5 mcg/dose (250 mcg/mL) 1.2 mL injection Inject 0.02 mLs (5 mcg total) into the skin 2 (two) times daily with meals.    famotidine (PEPCID) 20 MG tablet Take 20 mg by mouth Daily.    levothyroxine (SYNTHROID) 88 MCG tablet Take 1 tablet (88 mcg total) by mouth every morning.    montelukast (SINGULAIR) 10 mg tablet Take 10 mg by mouth every evening.    pregabalin (LYRICA) 150 MG capsule Take 150 mg by mouth 2 (two) times daily.    rosuvastatin (CRESTOR) 40 MG Tab Take 1 tablet (40 mg total) by mouth Daily.    venlafaxine (EFFEXOR-XR) 75 MG 24 hr capsule Take 1 capsule (75 mg total) by mouth once daily.    azelastine (ASTELIN) 137 mcg (0.1 %) nasal spray 1 spray by Nasal route 2 (two) times daily.    blood sugar diagnostic Strp To check BG 1 times daily for NIDDM, to use with insurance preferred meter    blood-glucose meter kit To check BG 1 times daily, to use with insurance preferred meter    cariprazine (VRAYLAR) 1.5 mg Cap Take 1 capsule (1.5 mg total) by mouth once daily. (Patient not taking: Reported on 8/14/2024)    lancets Misc To check BG 1 times daily for NIDDM, to use with insurance preferred meter     Family History       Problem Relation (Age of Onset)    Arthritis Mother    Cancer Mother, Father    Depression Sister    Diabetes Mother, Father    Hearing loss Father          Tobacco Use    Smoking status: Every Day     Current packs/day: 0.50     Average packs/day: 0.5 packs/day for 49.6 years (24.8 ttl pk-yrs)     Types: Cigarettes     Start date: 1975     Passive exposure: Current    Smokeless tobacco: Never   Substance and Sexual Activity    Alcohol use: Not Currently    Drug use: Never    Sexual activity: Never     Review of Systems   Constitutional:  Negative for fever.   HENT: Negative.     Eyes:  Negative.    Respiratory: Negative.  Negative for shortness of breath.    Cardiovascular:  Negative for chest pain.   Gastrointestinal:  Negative for abdominal distention, abdominal pain, nausea and vomiting.   Musculoskeletal:  Negative for back pain.   Neurological: Negative.    Psychiatric/Behavioral:  Positive for behavioral problems and dysphoric mood.      Objective:     Vital Signs (Most Recent):  Temp: 98.5 °F (36.9 °C) (08/15/24 0609)  Pulse: 88 (08/15/24 0609)  Resp: 16 (08/15/24 0609)  BP: 106/66 (08/15/24 0609)  SpO2: 97 % (08/15/24 0609) Vital Signs (24h Range):  Temp:  [98.5 °F (36.9 °C)] 98.5 °F (36.9 °C)  Pulse:  [84-88] 88  Resp:  [16] 16  SpO2:  [97 %] 97 %  BP: (106-126)/(66-72) 106/66     Weight: 60.5 kg (133 lb 6.1 oz)  Body mass index is 23.63 kg/m².     Physical Exam  Vitals and nursing note reviewed. Exam conducted with a chaperone present.   Constitutional:       General: She is not in acute distress.     Appearance: Normal appearance.   HENT:      Head: Normocephalic and atraumatic.      Nose: Nose normal.      Mouth/Throat:      Mouth: Mucous membranes are moist.   Eyes:      Extraocular Movements: Extraocular movements intact and EOM normal.      Conjunctiva/sclera: Conjunctivae normal.      Pupils: Pupils are equal, round, and reactive to light.   Cardiovascular:      Rate and Rhythm: Normal rate and regular rhythm.      Heart sounds: Normal heart sounds. No murmur heard.     No gallop.   Pulmonary:      Effort: Pulmonary effort is normal.      Breath sounds: Normal breath sounds. No wheezing, rhonchi or rales.   Musculoskeletal:         General: No swelling or deformity. Normal range of motion.      Cervical back: Normal range of motion and neck supple.   Skin:     General: Skin is warm and dry.   Neurological:      General: No focal deficit present.      Mental Status: She is alert.      Gait: Gait normal.   Psychiatric:         Attention and Perception: Attention normal.         Mood  and Affect: Mood is depressed.         Speech: Speech normal.         Behavior: Behavior is cooperative.         CRANIAL NERVES     CN I  cranial nerve I not tested    CN II   Visual fields full to confrontation.     CN III, IV, VI   Pupils are equal, round, and reactive to light.  Extraocular motions are normal.   Right pupil: Accommodation: intact.   Left pupil: Accommodation: intact.   CN III: no CN III palsy  CN VI: no CN VI palsy    CN V   Facial sensation intact.   Right facial sensation deficit: none  Left facial sensation deficit: none    CN VII   Facial expression full, symmetric.   Right facial weakness: none  Left facial weakness: none    CN VIII   CN VIII normal.   Hearing: intact    CN IX, X   CN IX normal.   CN X normal.   Palate: symmetric    CN XI   CN XI normal.   Right sternocleidomastoid strength: normal  Left sternocleidomastoid strength: normal  Right trapezius strength: normal  Left trapezius strength: normal    CN XII   CN XII normal.   Tongue: not atrophic  Fasciculations: absent  Tongue deviation: none         Significant Labs: All pertinent labs within the past 24 hours have been reviewed.  Recent Lab Results         08/15/24  0440   08/14/24  1728        Cholesterol Total 150         Estimated Avg Glucose 151.3         Gluc Fast 104         HDL 54         Hemoglobin A1C External 6.9         LDL Cholesterol 82.00         POCT Glucose   84       Syphilis Antibody Nonreactive         Total Cholesterol/HDL Ratio 3         Triglycerides 70         Very Low Density Lipoprotein 14                 Significant Imaging: I have reviewed all pertinent imaging results/findings within the past 24 hours.

## 2024-08-15 NOTE — PSYCH EVALUATION
"Behavioral Health Unit  Psychosocial History and Assessment  Progress Note      Patient Name: Magda Wahl YOB: 1960 SW: Obey MillerFRANCO madrigalW Date: 8/15/2024    Chief Complaint: suicidal ideation and Pt overdosed on Effexor and alcohol.  Pt denies she was trying to kill herself.  States "I just wanted it to stop".    Consent:     Did the patient consent for an interview with the ? Yes    Did the patient consent for the  to contact family/friend/caregiver?   Yes  Relationship:     Did the patient give consent for the  to inform family/friend/caregiver of his/her whereabouts or to discuss discharge planning? Yes    Source of Information: Face to face with patient, Telephone interview with family/friend/caregiver, and Chart review    Is information obtained from interviews considered reliable?   yes    Reason for Admission:     There are no hospital problems to display for this patient.      History of Present Illness - (Patient Perception):   Pt has long history of MH issues.  Pt upset over all of the family and financial issues she has.  States she thinks about things to much and it all just gets to her.  Pt has had previous hospitalizations in [place].  Pt currently sees jenna bates and Carlos Jarvis in [place].  Pt also has a 47 year old disabled son that she struggles to care for.    History of Present Illness - (Perception of Others): Years according to patient    Present biopsychosocial functioning: moderate    Past biopsychosocial functioning: Pt has a history of higher function.    Family and Marital/Relationship History:     Significant Other/Partner Relationships:  : 47 years.  3 adult children.  Describes family as close.    Family Relationships: Strained      Childhood History:     Where was patient raised? [place]    Who raised the patient? parents      How does patient describe their childhood? Ok  Middle child of 11 " "children.  Father is .  Mother has Alzheimer's and is care for by other family members.      Who is patient's primary support person?       Culture and Sabianism:     Sabianism: Latter-day    How strong of a role does Druze and spirituality play in patient's life? "I try to be spiritual"    Roman Catholic or spiritual concerns regarding treatment: not applicable     History of Abuse:   History of Abuse: Denies      Outcome:     Psychiatric and Medical History:     History of psychiatric illness or treatment: prior inpatient treatment and currently under psychiatric care    Medical history:   Past Medical History:   Diagnosis Date    Bipolar disorder 7/3/2023    Allergy to zyprexa, abilify ineffective. Last took vraylar 1.5- has not filled in 9 months, declines medication     Chronic venous insufficiency 2018    Degenerative disc disease, lumbar 7/3/2023    Fall in  at work, worked up by Dr. Soto and workman's comp.  Deemed disabled.  Completed injections and PT in the past. Did not tolerate gabapentin, now takes lyrica only once daily PRN, filled per Dr. Mendoza.     Depressive disorder 7/3/2023    Last filled cymbalta 2022    Hypothyroidism 7/3/2023    Gaps in fill hx but no dose adjustment in over a year. Continue 88mcg.     Mild chronic obstructive pulmonary disease 7/3/2023    2018 pft showed mild restriction. Patient did not do PFT in  as ordered.     Mitral valve insufficiency 7/3/2023    Taking plavix    Mixed hyperlipidemia 7/3/2023    Last prescribed rosuvastatin 40 per Dr. Medrano, gaps in fill hx,     Obstructive sleep apnea syndrome 7/3/2023    Improved with CPAP    Tobacco user 7/3/2023    Down to 1/2 ppd. Declines medication     Type 2 diabetes mellitus 7/3/2023    Established with Dr. Franklin for vision.  Last prescribed metformin 1000 daily. Stopped d/t side effects. Begin ozempic    Vitamin D deficiency 7/3/2023       Substance Abuse History:     Alcohol - (Patient " Perspective):   Social History     Substance and Sexual Activity   Alcohol Use Not Currently       Alcohol - (Collateral Perspective): none according to patient    Drugs - (Patient Perspective):   Social History     Substance and Sexual Activity   Drug Use Never       Drugs - (Collateral Perspective): none according to patient    Additional Comments:     Education:     Currently Enrolled? No  High School (9-12) or GED    Special Education? No    Interested in Completing Education/GED: No    Employment and Financial:     Currently employed? disabled    Source of Income:  's SSA    Able to afford basic needs (food, shelter, utilities)? Yes    Who manages finances/personal affairs? patient      Service:     ? no    Combat Service? No     Community Resources:     Describe present use of community resources: [facility] in [place]     Identify previously used community resources   (Include previous mental health treatment - outpatient and inpatient): Pt has had 2 prior inpatient admits    Environmental:     Current living situation:Lives with spouse    Social Evaluation:     Patient Assets: General fund of knowledge, Supportive family/friends, Motivation for treatment/growth, and Capable of independent living    Patient Limitations: poor coping skills, lack of adequate support    High risk psychosocial issues that may impact discharge planning:   none    Recommendations:     Anticipated discharge plan:   outpatient follow up    High risk issues requiring early treatment planning and immediate intervention: none    Community resources needed for discharge planning:  aftercare treatment sources    Anticipated social work role(s) in treatment and discharge planning:  will offer advice and  as well as group therapy 4x per week and individual as needed.   will assisst with discharge planning and referral to aftercare resources.

## 2024-08-15 NOTE — HPI
63yo CF with PMH of venous insufficiency, hypothyroidism, HLD, DMT2, depression, gerd, tobacco use was admitted to [facility] with suicide attempt by overdose with effexor and alcohol. Pt denies sob, cp, trouble tasting or smelling. Labs and vitals reviewed.

## 2024-08-15 NOTE — PLAN OF CARE
Problem: Adult Behavioral Health Plan of Care  Goal: Plan of Care Review  Outcome: Progressing  Goal: Patient-Specific Goal (Individualization)  Outcome: Progressing  Goal: Adheres to Safety Considerations for Self and Others  Outcome: Progressing  Goal: Absence of New-Onset Illness or Injury  Outcome: Progressing  Intervention: Identify and Manage Fall Risk  Flowsheets (Taken 8/14/2024 2015)  Safety Measures: suicide assessment completed  Goal: Optimized Coping Skills in Response to Life Stressors  Outcome: Progressing  Intervention: Promote Effective Coping Strategies  Flowsheets (Taken 8/14/2024 2015)  Supportive Measures:   active listening utilized   verbalization of feelings encouraged

## 2024-08-15 NOTE — NURSING
"Daily Nursing Note:      Behavior:    Patient (Magda Wahl is a 64 y.o. female, : 1960, MRN: [id number]) demonstrating an affect that was flat. Magda demonstrating mood that is depressed and pleasant and appropriate. Magda had an appearance that was clean. Magda denies suicidal ideation. Magda denies suicide plan. Magda denies homicidal ideation. Magda denies hallucinations. She rates her anxiety 1/10 and depression a 5/10. She reports to having trouble staying asleep last night. She interacts when approached, otherwise withdrawn and quiet.    Magda's  height is 5' 3" (1.6 m) and weight is 60.5 kg (133 lb 6.1 oz). Her oral temperature is 98.5 °F (36.9 °C). Her blood pressure is 106/66 and her pulse is 88. Her respiration is 16 and oxygen saturation is 97%.   Rolos last BM was noted on: 24      Intervention:    Encourage Magda to perform self-hygiene, grooming, and changing of clothing. Monitor Magda's behavior and program compliance. Monitor Magda for suicidal ideation, homicidal ideation, sleep disturbance, and hallucinations. Encourage Magda to eat all portions of meals and assess for meal preferences. Monitor Magda for intake and output to ensure hydration. Notify the Physician for any medication refusal and any change in patient condition.      Response:    Magda verbalizes understand of unit process and procedures. She is compliant with medications, no adverse effects observed or reported. Will continue to monitor mood and behavior, encourage group participation and offer emotional support.      Plan:     Continue to monitor per MD orders; maintain patient safety. Q 15 min safety checks with suicide precautions.  "

## 2024-08-15 NOTE — PROGRESS NOTES
Recreation Therapy Progress Note    Date: 8  15  2024    Time: 1400     Group Title: leisure skills    Mood: depressed     Behavior: attended group and participated    Affect: depressed but better eye contact in 2nd group     Speech: pt talking a little more in 2nd group today    Cognition: pt more alert in 2nd group    Participation Level: 100% in art and music activity that helped her to relax.    Intervention:cont rt services.           Recreation Therapist   Signature: chad RAYMONDS   none

## 2024-08-15 NOTE — PLAN OF CARE
Problem: Diabetes Comorbidity  Goal: Blood Glucose Level Within Targeted Range  Outcome: Progressing     Problem: Suicide Risk  Goal: Absence of Self-Harm  Outcome: Progressing  Intervention: Assess Risk to Self and Maintain Safety  Flowsheets (Taken 8/15/2024 1813)  Behavior Management: behavioral plan reviewed  Intervention: Promote Psychosocial Wellbeing  Flowsheets (Taken 8/15/2024 1813)  Supportive Measures:   active listening utilized   counseling provided   decision-making supported   goal-setting facilitated   positive reinforcement provided   verbalization of feelings encouraged  Family/Support System Care: involvement promoted     Problem: Adult Behavioral Health Plan of Care  Goal: Plan of Care Review  Outcome: Progressing  Goal: Patient-Specific Goal (Individualization)  Outcome: Progressing  Goal: Adheres to Safety Considerations for Self and Others  Outcome: Progressing  Goal: Absence of New-Onset Illness or Injury  Outcome: Progressing  Goal: Optimized Coping Skills in Response to Life Stressors  Outcome: Progressing     Problem: Depression  Goal: Improved Mood  Outcome: Progressing  Intervention: Monitor and Manage Depressive Symptoms  Flowsheets (Taken 8/15/2024 1813)  Supportive Measures:   active listening utilized   counseling provided   decision-making supported   goal-setting facilitated   positive reinforcement provided   verbalization of feelings encouraged  Family/Support System Care: involvement promoted

## 2024-08-15 NOTE — PROGRESS NOTES
Recreation Therapy Progress Note    Date: 8  15  2024    Time: 1400    Group Title: creative expression    Mood: depressed    Behavior: participated in group     Affect: flat and low energy. Pt depressed she states.    Speech: quiet     Cognition: alert and focus on group activity. Cognitive games and art activity     Participation Level: 100% in group    Intervention:cont rt services          Recreation Therapist   Signature: chad lewis MA CTRS

## 2024-08-16 LAB
POCT GLUCOSE: 104 MG/DL (ref 70–110)
POCT GLUCOSE: 115 MG/DL (ref 70–110)

## 2024-08-16 PROCEDURE — 25000003 PHARM REV CODE 250: Performed by: PSYCHIATRY & NEUROLOGY

## 2024-08-16 PROCEDURE — 11400000 HC PSYCH PRIVATE ROOM

## 2024-08-16 PROCEDURE — 25000003 PHARM REV CODE 250: Performed by: INTERNAL MEDICINE

## 2024-08-16 PROCEDURE — S4991 NICOTINE PATCH NONLEGEND: HCPCS | Performed by: PSYCHIATRY & NEUROLOGY

## 2024-08-16 RX ADMIN — NICOTINE 1 PATCH: 21 PATCH, EXTENDED RELEASE TRANSDERMAL at 08:08

## 2024-08-16 RX ADMIN — FAMOTIDINE 20 MG: 20 TABLET ORAL at 04:08

## 2024-08-16 RX ADMIN — MONTELUKAST 10 MG: 10 TABLET, FILM COATED ORAL at 08:08

## 2024-08-16 RX ADMIN — ATORVASTATIN CALCIUM 80 MG: 40 TABLET, FILM COATED ORAL at 08:08

## 2024-08-16 RX ADMIN — VENLAFAXINE HYDROCHLORIDE 150 MG: 150 CAPSULE, EXTENDED RELEASE ORAL at 08:08

## 2024-08-16 RX ADMIN — CLOPIDOGREL BISULFATE 75 MG: 75 TABLET ORAL at 08:08

## 2024-08-16 RX ADMIN — LEVOTHYROXINE SODIUM 88 MCG: 0.09 TABLET ORAL at 07:08

## 2024-08-16 RX ADMIN — PREGABALIN 150 MG: 150 CAPSULE ORAL at 08:08

## 2024-08-16 NOTE — PROGRESS NOTES
"2024 9:49 AM   Name: Magda Wahl   : 1960   MRN: [id number]   Critical access hospital Progress Note     SUBJECTIVE:   Pt seen and chart reviewed, received update from staff. Pt is new to me, received clinical update from staff and reviewed notes by Dr. Velazquez. Briefly, pt was admitted on [facility] following a suicide attempt by overdose on Effexor and etoh. For interview, she presents casually dressed with fair hygiene and grooming. Affect is glum and constricted, TP linear. She reports mood still depressed and anxious, states she feels like she "can't turn [her] brain off" and it is constantly ruminating in anxious/worrisome fashion. Reports some family discord (between her and younger sister) that certainly contributes to hopelessness. She does report feeling slightly better since arrival, no longer wanting to hurt herself and denies plan/intent to do so. No psychotic sx reported or observed. She is adherent with meds and denies s/e. Appetite fair. Reports sleep is broken and inconsistent. Was taking doxepin PTA but did not find it helpful. Agrees to a trial of trazodone here.        Current Medications:   Scheduled Meds:    atorvastatin  80 mg Oral Daily    clopidogreL  75 mg Oral Daily    famotidine  20 mg Oral Daily    levothyroxine  88 mcg Oral QAM    montelukast  10 mg Oral QHS    nicotine  1 patch Transdermal Daily    pregabalin  150 mg Oral BID    venlafaxine  150 mg Oral Daily      PRN Meds:   Current Facility-Administered Medications:     acetaminophen, 650 mg, Oral, Q6H PRN    aluminum-magnesium hydroxide-simethicone, 30 mL, Oral, Q6H PRN    glucagon (human recombinant), 1 mg, Intramuscular, PRN    glucose, 16 g, Oral, PRN    glucose, 24 g, Oral, PRN    hydrOXYzine pamoate, 50 mg, Oral, Q6H PRN    ibuprofen, 400 mg, Oral, Q6H PRN    insulin aspart U-100, 0-10 Units, Subcutaneous, BID PRN    LORazepam, 1 mg, Oral, Q6H PRN     Allergies:   Review of patient's allergies indicates:   Allergen Reactions    Fluoxetine "      Other reaction(s): Hearing voices    Codeine Hallucinations     Williams voices      Olanzapine Hives        OBJECTIVE:   Vitals   Vitals:    08/16/24 0606   BP: (!) 100/51   Pulse: 78   Resp: 16   Temp: 98.2 °F (36.8 °C)        Mental Status Exam:  Appearance: appropriate, fair hygiene/grooming, and casually dressed  Sensorium: alert  Orientation: oriented to person, place, and time  Behavior/Cooperation: calm and cooperative  Movements/Motor Activity: No tremor or involuntary movements observed. No psychomotor retardation or agitation  Speech: normal tone, normal rate, normal volume  Affect: constricted and glum  Mood: anxious, depressed  Thought Process: linear and organized  Associations: no loosening of associations  Thought Content: denies SI/HI/plan/intent and no paranoia or delusions volunteered  Thought Perceptions: denies AVH and no RIS observed during assessment  Attention/Concentration: Grossly intact  Memory: recent and remote grossly intact  Insight: limited  Judgment: limited    Recent Results (from the past 48 hour(s))   POCT glucose    Collection Time: 08/14/24  5:28 PM   Result Value Ref Range    POCT Glucose 84 70 - 110 mg/dL   SYPHILIS ANTIBODY (WITH REFLEX RPR)    Collection Time: 08/15/24  4:40 AM   Result Value Ref Range    Syphilis Antibody Nonreactive Nonreactive, Equivocal   Hemoglobin A1C    Collection Time: 08/15/24  4:40 AM   Result Value Ref Range    Hemoglobin A1c 6.9 <=7.0 %    Estimated Average Glucose 151.3 mg/dL   Glucose, Fasting    Collection Time: 08/15/24  4:40 AM   Result Value Ref Range    Glucose Fasting 104 (H) 70 - 100 mg/dL   Lipid Panel    Collection Time: 08/15/24  4:40 AM   Result Value Ref Range    Cholesterol Total 150 <=200 mg/dL    HDL Cholesterol 54 35 - 60 mg/dL    Triglyceride 70 37 - 140 mg/dL    Cholesterol/HDL Ratio 3 0 - 5    Very Low Density Lipoprotein 14     LDL Cholesterol 82.00 50.00 - 140.00 mg/dL   POCT glucose    Collection Time: 08/15/24  5:43 AM  "  Result Value Ref Range    POCT Glucose 121 (H) 70 - 110 mg/dL   POCT glucose    Collection Time: 08/15/24  4:57 PM   Result Value Ref Range    POCT Glucose 110 70 - 110 mg/dL   POCT glucose    Collection Time: 08/16/24  5:29 AM   Result Value Ref Range    POCT Glucose 115 (H) 70 - 110 mg/dL      No results found for: "PHENYTOIN", "PHENOBARB", "VALPROATE", "CBMZ"      ASSESSMENT/PLAN:   Diagnosis:  Active Hospital Problems    Diagnosis  POA    *Severe episode of recurrent major depressive disorder, without psychotic features [F33.2]  Yes    Suicide ideation [R45.851]  Not Applicable       Plan:  - Effexor XR recently increased to 150mg PO daily, CTM for tolerability and response  - Start trial of Trazodone 50mg PO qhs for insomnia  - Consider additional adjunctive agent if sx stagnate or do not improve during her stay here  - CTM and provide support    Expected Disposition Plan: Home      Juliano tSanley MD  Psychiatry - [facility]  08/16/2024 9:59 AM    "

## 2024-08-16 NOTE — H&P
[facility]  Psychiatry  History & Physical    Patient Name: Magda Wahl  MRN: [id number]   Code Status: Full Code  Admission Date: 2024  Attending Physician: Luis Velazquez MD   Primary Care Provider: Omar Barboza APRN    Current Legal Status:  Patient was this time presents physician's commitment    Patient information was obtained from patient and ER records.     Subjective:     Principal Problem:Severe episode of recurrent major depressive disorder, without psychotic features    Chief Complaint:  I tried kill myself     HPI: 63 yo female who presents post attempts to self injure.  Patient was this time he was events after she was gestures to self injure overdoses Effexor XR.  Patient was deemed so with the intention to end her life.      Patient was this time describes recent life stressor characterized by the followin.  Severe financial stress associated with being on disability in her  return   2. Marked instability overall mood  3.  Low self-worth self-esteem   4.  Preoccupation with past events.  5. Lack of interest in pleasurable activities   6.  Poor concentration   7. Ever present anxiety  8. Initial terminal insomnia      Patient was this time reports she was struggled most recently by struggling with finances, struggling with the raising adult disabled son, in conflict with sister over patient was 92-year-old mother.    Patient was has struggled at this time with the mood and continues to do so.      Patient was this time does has a history of past use of alcohol.    Patient was denies use of cannabis other substances.      Patient was this time when questioned about issues of trauma earlier in life denies.  She currently lives in [facility].  She was describes having no previous attempts to self injure.    Psychiatric history shows that patient was been seen on outpatient basis per provider.  Patient was states she was seen by mental  health prescriber roughly every 3-6 months.  She was states she does have a therapist.    Medical surgical history he was benign     Medications: Patient was this time does not know the names of her medications.      Family history significant for significant depression on maternal side family.  Paternal grandfather reportedly shot himself.  He was describes no issues of schizophrenia in the family session.      Patient was does has a high school level education.  He was a father of 3 adults has 7 grandchildren.  Patient was highest grade education was 10th grade.  She was states she was less goal because she got pregnant.    Patient was denies any prior history of legal difficulties.         Patient History               Medical as of 8/15/2024       Past Medical History       Diagnosis Date Comments Source    Bipolar disorder 7/3/2023 Allergy to zyprexa, abilify ineffective. Last took vraylar 1.5- has not filled in 9 months, declines medication  Provider    Chronic venous insufficiency 8/20/2018 -- Provider    Degenerative disc disease, lumbar 7/3/2023 Fall in 2010 at work, worked up by Dr. Soto and workman's comp.  Deemed disabled.  Completed injections and PT in the past. Did not tolerate gabapentin, now takes lyrica only once daily PRN, filled per Dr. Mendoza.  Provider    Depressive disorder 7/3/2023 Last filled cymbalta sept 2022 Provider    Hypothyroidism 7/3/2023 Gaps in fill hx but no dose adjustment in over a year. Continue 88mcg.  Provider    Mild chronic obstructive pulmonary disease 7/3/2023 2018 pft showed mild restriction. Patient did not do PFT in 2022 as ordered.  Provider    Mitral valve insufficiency 7/3/2023 Taking plavix Provider    Mixed hyperlipidemia 7/3/2023 Last prescribed rosuvastatin 40 per Dr. Medrano, gaps in fill hx,  Provider    Obstructive sleep apnea syndrome 7/3/2023 Improved with CPAP Provider    Tobacco user 7/3/2023 Down to 1/2 ppd. Declines medication  Provider    Type 2 diabetes  mellitus 7/3/2023 Established with Dr. Franklin for vision.  Last prescribed metformin 1000 daily. Stopped d/t side effects. Begin ozempic Provider    Vitamin D deficiency 7/3/2023 -- Provider                          Surgical as of 8/15/2024       Past Surgical History       Procedure Laterality Date Comments Source    COLONOSCOPY -- -- Debbie Mccann MD Provider    EYE SURGERY -- -- -- Patient    TUBAL LIGATION -- -- -- Patient    CHOLECYSTECTOMY -- -- -- Patient                          Family as of 8/15/2024       Problem Relation Name Age of Onset Comments Source    Arthritis Mother Amirah Herring -- -- Patient    Cancer Mother Amirah Herring -- -- Patient    Diabetes Mother Amirah Herring -- -- Patient    Cancer Father SHANDA Herring -- -- Patient    Diabetes Father SHANDA Herring -- -- Patient    Hearing loss Father SHANDA Herring -- -- Patient    Depression Sister Magda Wahl -- -- Patient                  Tobacco Use as of 8/15/2024       Smoking Status Smoking Start Date Last Attempt to Quit Current Packs/Day Average Packs/Day    Every Day 1975 -- 0.5 0.5 packs/day for 49.6 years (24.8 ttl pk-yrs)   Pack Year History   Packs/Day From To Years    0.5 1975 -- 49.6      Passive Exposure    Current      Smokeless Status Smokeless Type Smokeless Quit Date    Never -- --      Source    Provider                  Alcohol Use as of 8/15/2024       Alcohol Use Drinks/Week Alcohol/Week Comments Source    Not Currently 0 Glasses of wine  0 Cans of beer  0 Shots of liquor  0 Drinks containing 0.5 oz of alcohol -- -- Patient                  Drug Use as of 8/15/2024       Drug Use Types Frequency Comments Source    Never -- -- -- Patient                  Sexual Activity as of 8/15/2024       Sexually Active Birth Control Partners Comments Source    Never -- -- -- Patient                  Activities of Daily Living as of 8/15/2024    None               Social Documentation as of 8/15/2024    None               Occupational as of 8/15/2024     None               Socioeconomic as of 8/15/2024       Marital Status Spouse Name Number of Children Years Education Education Level Preferred Language Ethnicity Race Source     -- -- -- -- English Not  or /a White --                  Pertinent History       Question Response Comments    Lives with -- --    Place in Birth Order -- --    Lives in -- --    Number of Siblings -- --    Raised by -- --    Legal Involvement -- --    Childhood Trauma -- --    Criminal History of -- --    Financial Status -- --    Highest Level of Education -- --    Does patient have access to a firearm? -- --     Service -- --    Primary Leisure Activity -- --    Spirituality -- --          Past Medical History:   Diagnosis Date    Bipolar disorder 7/3/2023    Allergy to zyprexa, abilify ineffective. Last took vraylar 1.5- has not filled in 9 months, declines medication     Chronic venous insufficiency 8/20/2018    Degenerative disc disease, lumbar 7/3/2023    Fall in 2010 at work, worked up by Dr. Soto and workman's comp.  Deemed disabled.  Completed injections and PT in the past. Did not tolerate gabapentin, now takes lyrica only once daily PRN, filled per Dr. Mendoza.     Depressive disorder 7/3/2023    Last filled cymbalta sept 2022    Hypothyroidism 7/3/2023    Gaps in fill hx but no dose adjustment in over a year. Continue 88mcg.     Mild chronic obstructive pulmonary disease 7/3/2023    2018 pft showed mild restriction. Patient did not do PFT in 2022 as ordered.     Mitral valve insufficiency 7/3/2023    Taking plavix    Mixed hyperlipidemia 7/3/2023    Last prescribed rosuvastatin 40 per Dr. Medrano, gaps in fill hx,     Obstructive sleep apnea syndrome 7/3/2023    Improved with CPAP    Tobacco user 7/3/2023    Down to 1/2 ppd. Declines medication     Type 2 diabetes mellitus 7/3/2023    Established with Dr. Franklin for vision.  Last prescribed metformin 1000 daily. Stopped d/t side effects. Begin  ozempic    Vitamin D deficiency 7/3/2023     Past Surgical History:   Procedure Laterality Date    CHOLECYSTECTOMY      COLONOSCOPY      Debbie Mccann MD    EYE SURGERY      TUBAL LIGATION       Family History       Problem Relation (Age of Onset)    Arthritis Mother    Cancer Mother, Father    Depression Sister    Diabetes Mother, Father    Hearing loss Father          Tobacco Use    Smoking status: Every Day     Current packs/day: 0.50     Average packs/day: 0.5 packs/day for 49.6 years (24.8 ttl pk-yrs)     Types: Cigarettes     Start date: 1975     Passive exposure: Current    Smokeless tobacco: Never   Substance and Sexual Activity    Alcohol use: Not Currently    Drug use: Never    Sexual activity: Never     Review of patient's allergies indicates:   Allergen Reactions    Fluoxetine      Other reaction(s): Hearing voices    Codeine Hallucinations     Motley voices      Olanzapine Hives       No current facility-administered medications on file prior to encounter.     Current Outpatient Medications on File Prior to Encounter   Medication Sig    ARIPiprazole (ABILIFY) 2 MG Tab Take 5 mg by mouth once daily.    busPIRone (BUSPAR) 10 MG tablet Take 1 tablet (10 mg total) by mouth once daily.    clopidogreL (PLAVIX) 75 mg tablet Take 1 tablet (75 mg total) by mouth once daily.    doxepin (SILENOR) 3 mg Tab Take 3 mg by mouth every evening.    exenatide (BYETTA) 5 mcg/dose (250 mcg/mL) 1.2 mL injection Inject 0.02 mLs (5 mcg total) into the skin 2 (two) times daily with meals.    famotidine (PEPCID) 20 MG tablet Take 20 mg by mouth Daily.    levothyroxine (SYNTHROID) 88 MCG tablet Take 1 tablet (88 mcg total) by mouth every morning.    montelukast (SINGULAIR) 10 mg tablet Take 10 mg by mouth every evening.    pregabalin (LYRICA) 150 MG capsule Take 150 mg by mouth 2 (two) times daily.    rosuvastatin (CRESTOR) 40 MG Tab Take 1 tablet (40 mg total) by mouth Daily.    venlafaxine (EFFEXOR-XR) 75 MG 24 hr capsule Take 1  "capsule (75 mg total) by mouth once daily.    azelastine (ASTELIN) 137 mcg (0.1 %) nasal spray 1 spray by Nasal route 2 (two) times daily.    blood sugar diagnostic Strp To check BG 1 times daily for NIDDM, to use with insurance preferred meter    blood-glucose meter kit To check BG 1 times daily, to use with insurance preferred meter    cariprazine (VRAYLAR) 1.5 mg Cap Take 1 capsule (1.5 mg total) by mouth once daily. (Patient not taking: Reported on 8/14/2024)    lancets Misc To check BG 1 times daily for NIDDM, to use with insurance preferred meter     Psych[employer]rapeutics (From admission, onward)      Start     Stop Route Frequency Ordered    08/15/24 1130  venlafaxine 24 hr capsule 75 mg         -- Oral Daily 08/15/24 1016    08/14/24 1725  LORazepam tablet 1 mg         -- Oral Every 6 hours PRN 08/14/24 1725          Review of Systems   Psychiatric/Behavioral:  Positive for decreased concentration, dysphoric mood, self-injury, sleep disturbance and suicidal ideas. The patient is nervous/anxious.      Strengths and Liabilities: Strength: Patient accepts guidance/feedback, Strength: Patient is intelligent., Liability: Patient is dependent., Liability: Patient has no suport network.    Objective:     Vital Signs (Most Recent):  Temp: 98.6 °F (37 °C) (08/15/24 1644)  Pulse: 82 (08/15/24 1644)  Resp: 16 (08/15/24 1644)  BP: 114/69 (08/15/24 1644)  SpO2: 98 % (08/15/24 1644) Vital Signs (24h Range):  Temp:  [98.5 °F (36.9 °C)-98.6 °F (37 °C)] 98.6 °F (37 °C)  Pulse:  [82-88] 82  Resp:  [16] 16  SpO2:  [97 %-98 %] 98 %  BP: (106-114)/(66-69) 114/69     Height: 5' 3" (160 cm)  Weight: 60.5 kg (133 lb 6.1 oz)  Body mass index is 23.63 kg/m².    No intake or output data in the 24 hours ending 08/15/24 1817       Physical Exam    Mental status examination:  64-year-old white female who affectively appears to be extremely constricted at this time.  Whose speech was good articulation and execution.  His thought processes " this time were non spontaneous but when produced were linear.  His thought content demonstrated no clear evidence of any auditory or visual hallucinations acknowledged this time.  She made no statements to harm others.  She was continued ongoing thoughts of self-harm.  Her insight and judgment this time were deemed to be limited poor.  On cognitive evaluation she was alert and oriented to person, place, setting and time.  Immediate memory is 3/3 objects immediately.  1/3 objects 5 minutes.  Long-term memory appeared to be intact.  Fund of knowledge is deemed to be low average.  Patient was adequately abstract        Significant Labs: Last 72 Hours:   Recent Lab Results  (Last 5 results in the past 72 hours)        08/15/24  1657   08/15/24  0543   08/15/24  0440   08/14/24  1728   08/13/24  2112        Phencyclidine         Negative       Amphetamines, Urine         Negative       Appearance, UA         Clear       Bacteria, UA         None Seen       Barbituates, Urine         Negative       Benzodiazepine, Urine         Negative       Bilirubin (UA)         Negative       Cannabinoids, Urine         Negative       Cholesterol Total     150           Cocaine, Urine         Negative       Color, UA         Yellow       Estimated Avg Glucose     151.3           Gluc Fast     104           Glucose, UA         Negative       HDL     54           Hemoglobin A1C External     6.9           Ketones, UA         Negative       LDL Cholesterol     82.00           Leukocyte Esterase, UA         Negative       Methadone Screen, Urine         Negative       NITRITE UA         Negative       Blood, UA         Small       Opiates, Urine         Negative       pH, UA         6.0       pH, Urine         6.0       POCT Glucose 110   121     84         Protein, UA         Negative       RBC, UA         0-2       Specific Gravity,UA         <=1.005       Squam Epithel, UA         Rare       Syphilis Antibody     Nonreactive            Total Cholesterol/HDL Ratio     3           Triglycerides     70           Urobilinogen, UA         0.2       Very Low Density Lipoprotein     14           WBC, UA         0-2                              Significant Imaging: None  Assessment/Plan:     * Severe episode of recurrent major depressive disorder, without psychotic features  Patient was this time will undergo full assessment for appropriate medication trials stabilize mood.  Patient was to be evaluated by this provider throughout the stay as benefit with the medications.  Once patient was shows stabilization of mood no longer appears to be at risk to self-harm and she was of functioning we will look towards disposition the patient was back into outpatient care.    Suicide ideation  Patient was this time presents with a history of suicide attempt by just being Effexor XR.  Patient was this time has a history of struggling mood.  Patient was this time presents after attempting to self-injurious with the overdose of Effexor XR.  Patient was need to undergo risk assessments.  Patient was deemed to be seen by psychiatric nursing as well as psychiatrist psychiatric social worker who at this time focused on evaluation risk to self-harm.       Estimated Discharge Date: 8/21/2024  Initial Discharge Plan: Home      Prognosis: Fair    Need for Continued Hospitalization:   Psychiatric illness continues to pose a potential threat to life or bodily function, of self or others, thereby requiring the need for continued inpatient psychiatric hospitalization. and Protective inpatient psychiatric hospitalization required while a safe disposition plan is enacted.    Total Time: 50 with greater than 50% of time spent in counseling and/or coordination of care.     Luis Velazquez MD   Psychiatry  [facility]

## 2024-08-16 NOTE — ASSESSMENT & PLAN NOTE
Patient was this time will undergo full assessment for appropriate medication trials stabilize mood.  Patient was to be evaluated by this provider throughout the stay as benefit with the medications.  Once patient was shows stabilization of mood no longer appears to be at risk to self-harm and she was of functioning we will look towards disposition the patient was back into outpatient care.

## 2024-08-16 NOTE — PROGRESS NOTES
Recreation Therapy Progress Note    Date: 8 16 2024    Time: 10:00 am group     Group Title: creative expression    Mood: depressed and anxious     Behavior: participated in group     Affect: anxious and depressed     Speech: pt talking a little more today    Cognition: alert on assignments  Intervention:cont rt services               Pt participated a 100% in group . Art and music class helps her to relax.      Recreation Therapist   Signature: chad lewis MA CTRS

## 2024-08-16 NOTE — PROGRESS NOTES
Recreation Therapy Progress Note    Date: 8 16 2024    Time: 1400    Group Title: leisure skills    Mood: depressed and anxious    Behavior: participating in group    Affect: depressed and anxious about life situations at home she states    Speech: pt talking more in group     Cognition: alert on assignments    Participation Level: 100% in group and progressing well.    Intervention:cont rt services.          Recreation Therapist   Signature: chda lewis MA CTRS

## 2024-08-16 NOTE — NURSING
"Daily Nursing Note:      Behavior:  Patient (Magda Wahl is a 64 y.o. female, : 1960, MRN: [id number]) demonstrating an affect that was sad, flat, and anxious. Magda demonstrating mood that is depressed and anxious. Magda had an appearance that was clean. Magda denies suicidal ideation. Magda denies suicide plan. Magda denies homicidal ideation. Magda denies hallucinations.    Magda's  height is 5' 3" (1.6 m) and weight is 60.5 kg (133 lb 6.1 oz). Her oral temperature is 98.6 °F (37 °C). Her blood pressure is 114/69 and her pulse is 82. Her respiration is 16 and oxygen saturation is 98%. Magda's last BM was noted on: 2024.    Intervention:  Encouraged Magda to perform self-hygiene, grooming, and changing of clothing. Monitored Magda's behavior and program compliance. Monitored Magda for suicidal ideation, homicidal ideation, sleep disturbance, and hallucinations. Encouraged Magda to eat all portions of meals and assesse for meal preferences. Monitored Magda for intake and output to ensure hydration. Will notify the Physician for any medication refusal and any change in patient condition.      Response:  Magda endorses mild anxiety 2/10, and no depression.  Denies any thoughts of wanting to harm herself or anyone else.  Denies any auditory or visual hallucinations. States she sleeps at night but has racing thoughts and doesn't feel like a restful sleep.        Plan:   Continue to monitor per MD orders; maintain patient safety with safety checks Q15 minutes and prn.   "

## 2024-08-16 NOTE — ASSESSMENT & PLAN NOTE
Patient was this time presents with a history of suicide attempt by just being Effexor XR.  Patient was this time has a history of struggling mood.  Patient was this time presents after attempting to self-injurious with the overdose of Effexor XR.  Patient was need to undergo risk assessments.  Patient was deemed to be seen by psychiatric nursing as well as psychiatrist psychiatric social worker who at this time focused on evaluation risk to self-harm.

## 2024-08-16 NOTE — PLAN OF CARE
Problem: Diabetes Comorbidity  Goal: Blood Glucose Level Within Targeted Range  Outcome: Progressing     Problem: Suicide Risk  Goal: Absence of Self-Harm  Outcome: Progressing     Problem: Adult Behavioral Health Plan of Care  Goal: Plan of Care Review  Outcome: Progressing  Goal: Patient-Specific Goal (Individualization)  Outcome: Progressing  Goal: Adheres to Safety Considerations for Self and Others  Outcome: Progressing  Goal: Absence of New-Onset Illness or Injury  Outcome: Progressing  Goal: Optimized Coping Skills in Response to Life Stressors  Outcome: Progressing     Problem: Depression  Goal: Improved Mood  Outcome: Progressing

## 2024-08-16 NOTE — NURSING
"Daily Nursing Note:      Behavior:    Patient (Magda Wahl is a 64 y.o. female, : 1960, MRN: [id number]) demonstrating an affect that was sad, anxious, and irritable. Magda demonstrating mood that is depressed and anxious. Magda had an appearance that was clean. Magda denies suicidal ideation. Magda denies suicide plan. Magda denies homicidal ideation. Magda denies hallucinations.    Magda's  height is 5' 3" (1.6 m) and weight is 60.5 kg (133 lb 6.1 oz). Her oral temperature is 98.2 °F (36.8 °C). Her blood pressure is 100/51 (abnormal) and her pulse is 78. Her respiration is 16 and oxygen saturation is 98%.     Rolos last BM was noted on: 2024. She states she is no longer suicidal. She also states she has racing thoughts.     Intervention:    Encourage Magda to perform self-hygiene, grooming, and changing of clothing. Monitor Magda's behavior and program compliance. Monitor Magda for suicidal ideation, homicidal ideation, sleep disturbance, and hallucinations. Encourage Magda to eat all portions of meals and assess for meal preferences.  Ensure hydration by offering fluids. Notify the Physician for any medication refusal and any change in patient condition.      Response:    Magda verbalizes understand of unit process and procedures. Magda is compliant with medications and treatment.    Plan:     Continue to monitor per MD orders; maintain patient safety. Q15min safety checks. Suicide precautions.  "

## 2024-08-16 NOTE — PLAN OF CARE
Problem: Suicide Risk  Goal: Absence of Self-Harm  Outcome: Progressing  Intervention: Assess Risk to Self and Maintain Safety  Flowsheets (Taken 8/15/2024 1950)  Behavior Management:   boundaries reinforced   impulse control promoted  Intervention: Promote Psychosocial Wellbeing  Flowsheets (Taken 8/15/2024 1950)  Supportive Measures:   active listening utilized   verbalization of feelings encouraged   self-reflection promoted  Intervention: Establish Safety Plan and Continuity of Care  Flowsheets (Taken 8/15/2024 1950)  Safe Transition Promotion: personal safety plan developed

## 2024-08-17 LAB
POCT GLUCOSE: 114 MG/DL (ref 70–110)
POCT GLUCOSE: 139 MG/DL (ref 70–110)

## 2024-08-17 PROCEDURE — 25000003 PHARM REV CODE 250: Performed by: INTERNAL MEDICINE

## 2024-08-17 PROCEDURE — S4991 NICOTINE PATCH NONLEGEND: HCPCS | Performed by: PSYCHIATRY & NEUROLOGY

## 2024-08-17 PROCEDURE — 25000003 PHARM REV CODE 250: Performed by: PSYCHIATRY & NEUROLOGY

## 2024-08-17 PROCEDURE — 11400000 HC PSYCH PRIVATE ROOM

## 2024-08-17 RX ORDER — LEVOTHYROXINE SODIUM 88 UG/1
88 TABLET ORAL
Status: DISCONTINUED | OUTPATIENT
Start: 2024-08-18 | End: 2024-08-23 | Stop reason: HOSPADM

## 2024-08-17 RX ADMIN — CLOPIDOGREL BISULFATE 75 MG: 75 TABLET ORAL at 08:08

## 2024-08-17 RX ADMIN — MONTELUKAST 10 MG: 10 TABLET, FILM COATED ORAL at 08:08

## 2024-08-17 RX ADMIN — PREGABALIN 150 MG: 150 CAPSULE ORAL at 08:08

## 2024-08-17 RX ADMIN — LORAZEPAM 1 MG: 1 TABLET ORAL at 08:08

## 2024-08-17 RX ADMIN — ATORVASTATIN CALCIUM 80 MG: 40 TABLET, FILM COATED ORAL at 08:08

## 2024-08-17 RX ADMIN — FAMOTIDINE 20 MG: 20 TABLET ORAL at 04:08

## 2024-08-17 RX ADMIN — NICOTINE 1 PATCH: 21 PATCH, EXTENDED RELEASE TRANSDERMAL at 08:08

## 2024-08-17 RX ADMIN — LEVOTHYROXINE SODIUM 88 MCG: 0.09 TABLET ORAL at 07:08

## 2024-08-17 RX ADMIN — VENLAFAXINE HYDROCHLORIDE 150 MG: 150 CAPSULE, EXTENDED RELEASE ORAL at 08:08

## 2024-08-17 RX ADMIN — HYDROXYZINE PAMOATE 50 MG: 25 CAPSULE ORAL at 08:08

## 2024-08-17 NOTE — NURSING
"Daily Nursing Note:      Behavior:    Patient (Magda Wahl is a 64 y.o. female, : 1960, MRN: [id number]) demonstrating an affect that was sad, anxious, and irritable. Magda demonstrating mood that is depressed and anxious. Magda had an appearance that was clean. Magda denies suicidal ideation. Magda denies suicide plan. Magda denies homicidal ideation. Magda denies hallucinations.    Magda's  height is 5' 3" (1.6 m) and weight is 60.5 kg (133 lb 6.1 oz). Her oral temperature is 98.5 °F (36.9 °C). Her blood pressure is 134/77 and her pulse is 72. Her respiration is 16 and oxygen saturation is 97%.       Intervention:    Encourage Magda to perform self-hygiene, grooming, and changing of clothing. Monitor Magda's behavior and program compliance. Monitor Magda for suicidal ideation, homicidal ideation, sleep disturbance, and hallucinations. Encourage Magda to eat all portions of meals and assess for meal preferences. Monitor Magda for intake and output to ensure hydration. Notify the Physician/Physician Assistant/Advance Practice Registered Nurse (MD/PA/APRN) for any medication refusal and any change in patient condition.      Response:    Magda verbalizes understand of unit process and procedures. Magda is guarded, constricted, irritable, medication compliant, will continue to monitor.    Plan:     Continue to monitor per MD/PA/APRN orders; maintain patient safety.  " room air

## 2024-08-17 NOTE — PLAN OF CARE
Problem: Suicide Risk  Goal: Absence of Self-Harm  Outcome: Progressing     Problem: Adult Behavioral Health Plan of Care  Goal: Plan of Care Review  Outcome: Progressing  Goal: Patient-Specific Goal (Individualization)  Outcome: Progressing  Goal: Adheres to Safety Considerations for Self and Others  Outcome: Progressing  Goal: Absence of New-Onset Illness or Injury  Outcome: Progressing  Goal: Optimized Coping Skills in Response to Life Stressors  Outcome: Progressing     Problem: Depression  Goal: Improved Mood  Outcome: Progressing

## 2024-08-17 NOTE — NURSING
2130. AAO and accepting. No complaints voiced. Accepted all PM meds as prescribed by Provider. Admits to Depression=6 and Anxiety=1. Denies any SI/HI/AH/VH. Currently resting quietly in bed.

## 2024-08-17 NOTE — GROUP NOTE
Education Group      Group Focus: Offered group on discharge planning.       Number of patients in attendance: 4    Group Start Time: 0845  Group End Time:  0930  Groups Date: 8/17/2024  Group Topic:  Behavioral Health  Group Department: [facility]  Group Facilitators:  Luz Valenzuela LPN  _____________________________________________________________________    Patient Name: Magda Wahl  MRN: [id number]  Patient Class: IP- Psych   Admission Date\Time: 8/14/2024  4:40 PM  Hospital Length of Stay: 3  Primary Care Provider: Omar Barboza APRN     Referred by: Behavioral Medicine Unit Treatment Team     Target symptoms: Depression     Patient's response to treatment: Active Listening, Self-disclosure, and Frequent Questions     Progress toward goals: Progressing well     Interval History: Verbalized  understanding. attentive     Diagnosis: MDD     Plan: Continue treatment on BMU

## 2024-08-17 NOTE — NURSING
"Daily Nursing Note:      Behavior:    Patient (Magda Wahl is a 64 y.o. female, : 1960, MRN: [id number]) demonstrating an affect that was anxious. Magda demonstrating mood that is anxious. Magda had an appearance that was clean. Magda denies suicidal ideation. Magda denies suicide plan. Magda denies homicidal ideation. Magda denies hallucinations.    Magda's  height is 5' 3" (1.6 m) and weight is 60.5 kg (133 lb 6.1 oz). Her oral temperature is 97.4 °F (36.3 °C). Her blood pressure is 117/66 and her pulse is 73. Her respiration is 20 and oxygen saturation is 98%.     Rolos last BM was noted on: 2024.      Intervention:    Encourage Magda to perform self-hygiene, grooming, and changing of clothing. Monitor Magda's behavior and program compliance. Monitor Magda for suicidal ideation, homicidal ideation, sleep disturbance, and hallucinations. Encourage Magda to eat all portions of meals and assess for meal preferences.  Ensure hydration by offering fluids. Notify the Physician for any medication refusal and any change in patient condition.      Response:    Magda verbalizes understand of unit process and procedures. Magda is compliant with medications and treatment.    Plan:     Continue to monitor per MD orders; maintain patient safety. Q15min safety checks. Suicide precautions.  "

## 2024-08-17 NOTE — GROUP NOTE
Group Psychotherapy       Group Focus: Promoting Healthy Lifestyles      Number of patients in attendance: 7    Group Start Time: 1345  Group End Time:  1430  Groups Date: 8/17/2024  Group Topic:  Behavioral Health  Group Department: [facility]  Group Facilitators:  Brionna Yadav RN  _____________________________________________________________________    Patient Name: Magda Wahl  MRN: [id number]  Patient Class: IP- Psych   Admission Date\Time: 8/14/2024  4:40 PM  Hospital Length of Stay: 3  Primary Care Provider: Omar Barboza APRN     Referred by: Behavioral Medicine Unit Treatment Team     Target symptoms: Mood Disorder     Patient's response to treatment: Active Listening, Self-disclosure, and Frequent Questions     Progress toward goals: Progressing well     Interval History: attended and participated in group     Diagnosis: Major depressive disorder, severe     Plan: Continue treatment on BMU

## 2024-08-17 NOTE — GROUP NOTE
Education Group      Group Focus: Offered structured activity to increase stress management skills.       Number of patients in attendance: 4    Group Start Time: 1200  Group End Time:  1245  Groups Date: 8/17/2024  Group Topic:  Behavioral Health  Group Department: [facility]  Group Facilitators:  Luz Valenzuela LPN  _____________________________________________________________________    Patient Name: Magda Wahl  MRN: [id number]  Patient Class: IP- Psych   Admission Date\Time: 8/14/2024  4:40 PM  Hospital Length of Stay: 3  Primary Care Provider: Omar Barboza APRN     Referred by: Behavioral Medicine Unit Treatment Team     Target symptoms: Depression     Patient's response to treatment: Active Listening, Self-disclosure, Frequent Questions, and Feedback given to another patient     Progress toward goals: Progressing adequately     Interval History: Completed task given.     Diagnosis: MDD     Plan: Continue treatment on BMU

## 2024-08-18 LAB
POCT GLUCOSE: 130 MG/DL (ref 70–110)
POCT GLUCOSE: 138 MG/DL (ref 70–110)

## 2024-08-18 PROCEDURE — S4991 NICOTINE PATCH NONLEGEND: HCPCS | Performed by: PSYCHIATRY & NEUROLOGY

## 2024-08-18 PROCEDURE — 11400000 HC PSYCH PRIVATE ROOM

## 2024-08-18 PROCEDURE — 25000003 PHARM REV CODE 250: Performed by: PSYCHIATRY & NEUROLOGY

## 2024-08-18 PROCEDURE — 25000003 PHARM REV CODE 250: Performed by: INTERNAL MEDICINE

## 2024-08-18 RX ORDER — BISACODYL 5 MG
10 TABLET, DELAYED RELEASE (ENTERIC COATED) ORAL DAILY PRN
Status: DISCONTINUED | OUTPATIENT
Start: 2024-08-21 | End: 2024-08-23 | Stop reason: HOSPADM

## 2024-08-18 RX ORDER — POLYETHYLENE GLYCOL 3350 17 G/17G
17 POWDER, FOR SOLUTION ORAL NIGHTLY
Status: DISCONTINUED | OUTPATIENT
Start: 2024-08-18 | End: 2024-08-19

## 2024-08-18 RX ADMIN — LEVOTHYROXINE SODIUM 88 MCG: 0.09 TABLET ORAL at 06:08

## 2024-08-18 RX ADMIN — HYDROXYZINE PAMOATE 50 MG: 25 CAPSULE ORAL at 08:08

## 2024-08-18 RX ADMIN — MONTELUKAST 10 MG: 10 TABLET, FILM COATED ORAL at 08:08

## 2024-08-18 RX ADMIN — NICOTINE 1 PATCH: 21 PATCH, EXTENDED RELEASE TRANSDERMAL at 08:08

## 2024-08-18 RX ADMIN — PREGABALIN 150 MG: 150 CAPSULE ORAL at 08:08

## 2024-08-18 RX ADMIN — ATORVASTATIN CALCIUM 80 MG: 40 TABLET, FILM COATED ORAL at 08:08

## 2024-08-18 RX ADMIN — FAMOTIDINE 20 MG: 20 TABLET ORAL at 04:08

## 2024-08-18 RX ADMIN — VENLAFAXINE HYDROCHLORIDE 150 MG: 150 CAPSULE, EXTENDED RELEASE ORAL at 08:08

## 2024-08-18 RX ADMIN — POLYETHYLENE GLYCOL 3350 17 G: 17 POWDER, FOR SOLUTION ORAL at 08:08

## 2024-08-18 RX ADMIN — CLOPIDOGREL BISULFATE 75 MG: 75 TABLET ORAL at 08:08

## 2024-08-18 NOTE — NURSING
2100. AAO and broad. Complaints of insomnia. Accepted all PM meds as prescribed by Provider. Depression=7 and Anxiety=5. Denies any SI/HI/AH/VH. Resting in bed quietly.

## 2024-08-18 NOTE — PLAN OF CARE
Problem: Diabetes Comorbidity  Goal: Blood Glucose Level Within Targeted Range  Outcome: Progressing     Problem: Adult Behavioral Health Plan of Care  Goal: Plan of Care Review  Outcome: Progressing  Goal: Patient-Specific Goal (Individualization)  Outcome: Progressing  Goal: Adheres to Safety Considerations for Self and Others  Outcome: Progressing  Goal: Absence of New-Onset Illness or Injury  Outcome: Progressing  Goal: Optimized Coping Skills in Response to Life Stressors  Outcome: Progressing     Problem: Depression  Goal: Improved Mood  Outcome: Progressing     Problem: Suicide Risk  Goal: Absence of Self-Harm  Outcome: Met

## 2024-08-18 NOTE — NURSING
"PRN Medication Follow-up Note:    Behavior:  Patient (Magda Wahl is a 64 y.o. female, : 1960, MRN: [id number])     Allergies: Fluoxetine, Codeine, and Olanzapine    Rolos  height is 5' 3" (1.6 m) and weight is 60.5 kg (133 lb 6.1 oz). Her oral temperature is 97.9 °F (36.6 °C). Her blood pressure is 166/82 (abnormal) and her pulse is 64. Her respiration is 16 and oxygen saturation is 100%.     Administered Vistaril and Ativan for insomnia and anxiety, per physician order to Magda.      Intervention:  Intervention to Magda's response: Vistaril 50mg and Ativan 1mg PO @ .       Response:  Magda's response: Some relief @ , resting in bed quietly.      Plan:   Continue to monitor per MD/PA/APRN orders; and reevaluate medication effectiveness within 30 minutes.  "

## 2024-08-18 NOTE — NURSING
"Daily Nursing Note:      Behavior:    Patient (Magda Wahl is a 64 y.o. female, : 1960, MRN: [id number]) demonstrating an affect that was anxious. Magda demonstrating mood that is anxious. Magda had an appearance that was clean. Magda denies suicidal ideation. Magda denies suicide plan. Magda denies homicidal ideation. Magda denies hallucinations.    Magda's  height is 5' 3" (1.6 m) and weight is 60.5 kg (133 lb 6.1 oz). Her oral temperature is 97.9 °F (36.6 °C). Her blood pressure is 166/82 (abnormal) and her pulse is 64. Her respiration is 16 and oxygen saturation is 100%.       Intervention:    Encourage Magda to perform self-hygiene, grooming, and changing of clothing. Monitor Magda's behavior and program compliance. Monitor Magda for suicidal ideation, homicidal ideation, sleep disturbance, and hallucinations. Encourage Magda to eat all portions of meals and assess for meal preferences. Monitor Magda for intake and output to ensure hydration. Notify the Physician/Physician Assistant/Advance Practice Registered Nurse (MD/PA/APRN) for any medication refusal and any change in patient condition.      Response:    Magda verbalizes understand of unit process and procedures. Magda is awake and alert, standing in the hallway waiting in line for PM medications, she stated she didn't sleep well last night, says  she tossed and turned,  Magda encourage to ask medicine nurse to help her sleep for tonight, appears anxious and concerned  about her sleep cycle tonight, will continue to monitor.    Plan:     Continue to monitor per MD/PA/APRN orders; maintain patient safety.  "

## 2024-08-18 NOTE — NURSING
"Prune juice provided for " I need something to help me go to the bathroom"   Encouraged water and walking. Verbalized understanding.   "

## 2024-08-18 NOTE — PLAN OF CARE
Problem: Suicide Risk  Goal: Absence of Self-Harm  Outcome: Progressing     Problem: Depression  Goal: Improved Mood  Outcome: Progressing

## 2024-08-18 NOTE — GROUP NOTE
Education Group      Group Focus: Offered group on characteristic of healthy relationships.       Number of patients in attendance: 5    Group Start Time: 1000  Group End Time:  1045  Groups Date: 8/18/2024  Group Topic:  Behavioral Health  Group Department: [facility]  Group Facilitators:  Luz Valenzuela LPN  _____________________________________________________________________    Patient Name: Magda Wahl  MRN: [id number]  Patient Class: IP- Psych   Admission Date\Time: 8/14/2024  4:40 PM  Hospital Length of Stay: 4  Primary Care Provider: Omar Barboza APRN     Referred by: Behavioral Medicine Unit Treatment Team     Target symptoms: Depression     Patient's response to treatment: Active Listening, Self-disclosure, Frequent Questions, and Feedback given to another patient     Progress toward goals: Progressing adequately     Interval History:    Good participation. Verbalized some understanding. attentive  Diagnosis: MDD     Plan: Continue treatment on BMU

## 2024-08-18 NOTE — GROUP NOTE
Group Psychotherapy       Group Focus: Promoting Healthy Lifestyles      Number of patients in attendance: 6    Group Start Time: 1530  Group End Time:  1615  Groups Date: 8/18/2024  Group Topic:  Behavioral Health  Group Department: [facility]  Group Facilitators:  Brionna Yadav RN  _____________________________________________________________________    Patient Name: Magda Wahl  MRN: [id number]  Patient Class: IP- Psych   Admission Date\Time: 8/14/2024  4:40 PM  Hospital Length of Stay: 4  Primary Care Provider: Omar Barboza APRN     Referred by: Behavioral Medicine Unit Treatment Team     Target symptoms: Mood Disorder     Patient's response to treatment: Active Listening, Self-disclosure, and Frequent Questions     Progress toward goals: Progressing well     Interval History: attended and participated in group     Diagnosis: recurrent major depressive disorder     Plan: Continue treatment on BMU

## 2024-08-18 NOTE — NURSING
"PRN Administration Note:    Behavior:  Patient (Magda Wahl is a 64 y.o. female, : 1960, MRN: [id number])     Allergies: Fluoxetine, Codeine, and Olanzapine    Magda's  height is 5' 3" (1.6 m) and weight is 60.5 kg (133 lb 6.1 oz). Her oral temperature is 97.9 °F (36.6 °C). Her blood pressure is 166/82 (abnormal) and her pulse is 64. Her respiration is 16 and oxygen saturation is 100%.     Reason for PRN Administration: C/O insomnia and anxiety.    Intervention:  Administered Vistaril 50mg and Ativan 1mg PO @ , per physician order to Magda.      Response:  Magda tolerated administration well.      Plan:   Continue to monitor per MD/PA/APRN orders; and reevaluate medication effectiveness within 30 minutes.  "

## 2024-08-18 NOTE — NURSING
"Daily Nursing Note:      Behavior:    Patient (Magda Wahl is a 64 y.o. female, : 1960, MRN: [id number]) demonstrating an affect that was anxious. Magda demonstrating mood that is anxious. Magda had an appearance that was clean. Magda denies suicidal ideation. Magda denies suicide plan. Magda denies homicidal ideation. Magda denies hallucinations.    Magda's  height is 5' 3" (1.6 m) and weight is 62 kg (136 lb 11 oz). Her oral temperature is 97.9 °F (36.6 °C). Her blood pressure is 111/66 and her pulse is 79. Her respiration is 16 and oxygen saturation is 97%.     Rolos last BM was noted on: 2024     Intervention:    Encourage Magda to perform self-hygiene, grooming, and changing of clothing. Monitor Magda's behavior and program compliance. Monitor Magda for suicidal ideation, homicidal ideation, sleep disturbance, and hallucinations. Encourage Magda to eat all portions of meals and assess for meal preferences.  Ensure hydration by offering fluids. Notify the Physician for any medication refusal and any change in patient condition.      Response:    Magda verbalizes understand of unit process and procedures. Magda is compliant with medications and treatment.    Plan:     Continue to monitor per MD orders; maintain patient safety. Q15min safety checks. Suicide precautions.  "

## 2024-08-19 LAB
POCT GLUCOSE: 115 MG/DL (ref 70–110)
POCT GLUCOSE: 116 MG/DL (ref 70–110)

## 2024-08-19 PROCEDURE — 11400000 HC PSYCH PRIVATE ROOM

## 2024-08-19 PROCEDURE — 25000003 PHARM REV CODE 250: Performed by: PSYCHIATRY & NEUROLOGY

## 2024-08-19 PROCEDURE — S4991 NICOTINE PATCH NONLEGEND: HCPCS | Performed by: PSYCHIATRY & NEUROLOGY

## 2024-08-19 PROCEDURE — 25000003 PHARM REV CODE 250: Performed by: INTERNAL MEDICINE

## 2024-08-19 RX ORDER — MIRTAZAPINE 15 MG/1
15 TABLET, FILM COATED ORAL NIGHTLY
Status: DISCONTINUED | OUTPATIENT
Start: 2024-08-19 | End: 2024-08-23 | Stop reason: HOSPADM

## 2024-08-19 RX ORDER — DOCUSATE SODIUM 100 MG/1
100 CAPSULE, LIQUID FILLED ORAL 2 TIMES DAILY
Status: DISCONTINUED | OUTPATIENT
Start: 2024-08-19 | End: 2024-08-23 | Stop reason: HOSPADM

## 2024-08-19 RX ORDER — HYDROXYZINE PAMOATE 25 MG/1
50 CAPSULE ORAL EVERY 6 HOURS PRN
Status: DISCONTINUED | OUTPATIENT
Start: 2024-08-19 | End: 2024-08-23 | Stop reason: HOSPADM

## 2024-08-19 RX ORDER — POLYETHYLENE GLYCOL 3350 17 G/17G
17 POWDER, FOR SOLUTION ORAL 2 TIMES DAILY
Status: COMPLETED | OUTPATIENT
Start: 2024-08-19 | End: 2024-08-19

## 2024-08-19 RX ORDER — LORAZEPAM 1 MG/1
1 TABLET ORAL EVERY 6 HOURS PRN
Status: DISCONTINUED | OUTPATIENT
Start: 2024-08-19 | End: 2024-08-23 | Stop reason: HOSPADM

## 2024-08-19 RX ADMIN — PREGABALIN 150 MG: 150 CAPSULE ORAL at 08:08

## 2024-08-19 RX ADMIN — FAMOTIDINE 20 MG: 20 TABLET ORAL at 04:08

## 2024-08-19 RX ADMIN — MONTELUKAST 10 MG: 10 TABLET, FILM COATED ORAL at 08:08

## 2024-08-19 RX ADMIN — CLOPIDOGREL BISULFATE 75 MG: 75 TABLET ORAL at 08:08

## 2024-08-19 RX ADMIN — NICOTINE 1 PATCH: 21 PATCH, EXTENDED RELEASE TRANSDERMAL at 08:08

## 2024-08-19 RX ADMIN — ATORVASTATIN CALCIUM 80 MG: 40 TABLET, FILM COATED ORAL at 08:08

## 2024-08-19 RX ADMIN — DOCUSATE SODIUM 100 MG: 100 CAPSULE, LIQUID FILLED ORAL at 08:08

## 2024-08-19 RX ADMIN — LEVOTHYROXINE SODIUM 88 MCG: 0.09 TABLET ORAL at 06:08

## 2024-08-19 RX ADMIN — MIRTAZAPINE 15 MG: 15 TABLET, FILM COATED ORAL at 08:08

## 2024-08-19 RX ADMIN — VENLAFAXINE HYDROCHLORIDE 150 MG: 150 CAPSULE, EXTENDED RELEASE ORAL at 08:08

## 2024-08-19 RX ADMIN — POLYETHYLENE GLYCOL 3350 17 G: 17 POWDER, FOR SOLUTION ORAL at 08:08

## 2024-08-19 NOTE — PROGRESS NOTES
Recreation Therapy Progress Note    Date: 8 19 2024     Time: 1400     Group Title: leisure skills    Mood: less depressed and less anxious    Behavior: attended group and participating at higher level.     Affect:   Less depressed and less anxious  Speech: pt talking more in group    Cognition: pt able to focus better on  assignments with less 1;1 needed     Participation Level: 100%     Intervention:cont rt services.           Recreation Therapist   Signature: chad lewis MA [facility]

## 2024-08-19 NOTE — GROUP NOTE
Group Psychotherapy       Group Focus: Promoting Healthy Lifestyles      Number of patients in attendance: 3    Group Start Time: 1600  Group End Time:  1645  Groups Date: 8/19/2024  Group Topic:  Behavioral Health  Group Department: [facility]  Group Facilitators:  Nia Mcconnell LPN  _____________________________________________________________________    Patient Name: Magda Wahl  MRN: [id number]  Patient Class: IP- Psych   Admission Date\Time: 8/14/2024  4:40 PM  Hospital Length of Stay: 5  Primary Care Provider: Omar Barboza APRN     Referred by: Behavioral Medicine Unit Treatment Team     Target symptoms: Depression, Anxiety, and Mood Disorder     Patient's response to treatment: Not Participating     Progress toward goals:      Interval History: Patient did not attend     Diagnosis:      Plan: Continue treatment on BMU

## 2024-08-19 NOTE — NURSING
"Daily Nursing Note:      Behavior:    Patient (Magda Wahl is a 64 y.o. female, : 1960, MRN: [id number]) demonstrating an affect that was anxious. Magda demonstrating mood that is anxious. Magda had an appearance that was clean. Magda denies suicidal ideation. Magda denies suicide plan. Magda denies homicidal ideation. Magda denies hallucinations.    Magda's  height is 5' 3" (1.6 m) and weight is 62 kg (136 lb 11 oz). Her oral temperature is 98 °F (36.7 °C). Her blood pressure is 152/83 (abnormal) and her pulse is 72. Her respiration is 16 and oxygen saturation is 99%.       Intervention:    Encourage Magda to perform self-hygiene, grooming, and changing of clothing. Monitor Magda's behavior and program compliance. Monitor Magda for suicidal ideation, homicidal ideation, sleep disturbance, and hallucinations. Encourage Magda to eat all portions of meals and assess for meal preferences. Monitor Magda for intake and output to ensure hydration. Notify the Physician/Physician Assistant/Advance Practice Registered Nurse (MD/PA/APRN) for any medication refusal and any change in patient condition.      Response:    Magda verbalizes understand of unit process and procedures. Magda is doing well, good spirits, playing cards and board games,  cooperative, pleasant, denies SI,  compliant with medication, will continue to monitor.    Plan:     Continue to monitor per MD/PA/APRN orders; maintain patient safety.  "

## 2024-08-19 NOTE — NURSING
"PRN Medication Follow-up Note:    Behavior:  Patient (Magda Wahl is a 64 y.o. female, : 1960, MRN: [id number])     Allergies: Fluoxetine, Codeine, and Olanzapine    Rolos  height is 5' 3" (1.6 m) and weight is 62 kg (136 lb 11 oz). Her oral temperature is 98 °F (36.7 °C). Her blood pressure is 152/83 (abnormal) and her pulse is 72. Her respiration is 16 and oxygen saturation is 99%.     Administered Vistaril for insomnia, per physician order to Magda.      Intervention:  Intervention to Magda's response: Vistaril 50mg PO @ .       Response:  Magda's response: Some relief @ , in bed resting quietly.      Plan:   Continue to monitor per MD/PA/APRN orders; and reevaluate medication effectiveness within 30 minutes.  "

## 2024-08-19 NOTE — PLAN OF CARE
Problem: Diabetes Comorbidity  Goal: Blood Glucose Level Within Targeted Range  Outcome: Progressing     Problem: Adult Behavioral Health Plan of Care  Goal: Plan of Care Review  Outcome: Progressing  Goal: Patient-Specific Goal (Individualization)  Outcome: Progressing  Goal: Adheres to Safety Considerations for Self and Others  Outcome: Progressing  Goal: Absence of New-Onset Illness or Injury  Outcome: Progressing  Goal: Optimized Coping Skills in Response to Life Stressors  Outcome: Progressing     Problem: Depression  Goal: Improved Mood  Outcome: Progressing

## 2024-08-19 NOTE — GROUP NOTE
Group Psychotherapy       Group Focus: Psychodynamic Group Psychotherapy      Number of patients in attendance: 7    Group Start Time: 0900  Group End Time:  0945  Groups Date: 8/19/2024  Group Topic:  Behavioral Health  Group Department: [facility]  Group Facilitators:  Obey Chadwick LCSW  _____________________________________________________________________    Patient Name: Magda Wahl  MRN: [id number]  Patient Class: IP- Psych   Admission Date\Time: 8/14/2024  4:40 PM  Hospital Length of Stay: 5  Primary Care Provider: Omar Barboza APRN     Referred by: Behavioral Medicine Unit Treatment Team     Target symptoms: Depression     Patient's response to treatment: Active Listening and Self-disclosure     Progress toward goals: Progressing adequately     Interval History: Group discussed the idea of therapy and how it can be used to identify behaviors and attitudes that need to be changed.        Diagnosis:      Plan: Continue treatment on U

## 2024-08-19 NOTE — PROGRESS NOTES
Hospital day 5.     64-year-old white female who at this time continues to have marked constricted in her affect.  She reports that this time struggling in terms of mood.  She appears sad and downcast.  She was states she was not sleep all that well last night.  She was very preoccupied at this time with current life stress.  Patient was apparently has put herself and significant financial stress and strain in his relationship to having blown 600 dollars at the [employer].  He was quite clearly she did so out of frustration and anxiety as association with conflict in her relationship.    She was in her  fixed in comes in his a result this is led to increased financial stress and this is a courses only further exacerbated.    Patient was this time verbalizes overall continued sadness, difficulties asleep present anxiety.      Mental status examination: 64-year-old white female whose affect at this time was constricted.  Whose speech was with good articulation and execution.  Her thought processes this time were not readily produced but when produced were linear.  His thought content demonstrated no clear evidence of any auditory or visual hallucinations at this time.  She made no active statements to harm self.  She made no active statements to harm others.  Her overall insight and judgment this time were deemed to be limited.  Orientation was intact.  Gait was steady.  She had not show tremor dyskinetic movement.      Impression:  Suicide ideation   Major depressive disorder recurrent severe without psychotic features     Estimated continued hospitalization 72-96 hours     Indications for continued hospitalization patient was time continues to be overwhelmed with the intrusive thoughts feelings of hopelessness despair and could not safely functioning lower level of care without being at risk to self injurious     Recommendations:  1. Continue trials of Effexor  mg p.o. q.day.  2. Patient was this time  describes ongoing challenges in terms of sleep difficulties in terms of initiation asleep and staying asleep.  We will initiate patient was trials of Remeron and see if patient was has a any benefit with the current medication trials   3. We will need to have  attempts to coordinate family session.

## 2024-08-19 NOTE — PROGRESS NOTES
Recreation Therapy Progress Note    Date: 8 19 2024    Time: 10:00 am group    Group Title: creative expression    Mood: less depressed and less anxious     Behavior: participated well in group . Art and music class helps her to relax.    Affect: hopeful and less depressed . Also less anxious    Speech: pt talking more in group  Cognition:     Participation Level: 100% . Pt completed 2 assignments  Intervention:cont rt services.          Recreation Therapist   Signature: chad lewis MA CTRS

## 2024-08-19 NOTE — NURSING
"Daily Nursing Note:      Behavior:    Patient (Magda Wahl is a 64 y.o. female, : 1960, MRN: [id number]) demonstrating an affect that was sad and anxious. Magda demonstrating mood that is depressed and anxious. Magda had an appearance that was clean. Magda denies suicidal ideation. Magda denies suicide plan. Magda denies homicidal ideation. Magda denies hallucinations.    Magda's  height is 5' 3" (1.6 m) and weight is 62 kg (136 lb 11 oz). Her oral temperature is 98.2 °F (36.8 °C). Her blood pressure is 124/69 and her pulse is 74. Her respiration is 20 and oxygen saturation is 98%.   Magda's last BM was noted on: 24, however she complains of constipation. New orders noted for Docusate and Miralax. Will monitor for relief.       Intervention:    Encourage Magda to perform self-hygiene, grooming, and changing of clothing. Monitor Magda's behavior and program compliance. Monitor Magda for suicidal ideation, homicidal ideation, sleep disturbance, and hallucinations. Encourage Magda to eat all portions of meals and assess for meal preferences. Monitor Magda for intake and output to ensure hydration. Notify the Physician for any medication refusal and any change in patient condition.      Response:    Magda verbalizes understand of unit process and procedures. Seamuss compliant with medications, no adverse effects observed or reported..      Plan:     Continue to monitor per MD orders; maintain patient safety. Q 15 min safety checks.  "

## 2024-08-20 LAB
POCT GLUCOSE: 106 MG/DL (ref 70–110)
POCT GLUCOSE: 131 MG/DL (ref 70–110)

## 2024-08-20 PROCEDURE — S4991 NICOTINE PATCH NONLEGEND: HCPCS | Performed by: PSYCHIATRY & NEUROLOGY

## 2024-08-20 PROCEDURE — 11400000 HC PSYCH PRIVATE ROOM

## 2024-08-20 PROCEDURE — 25000003 PHARM REV CODE 250: Performed by: INTERNAL MEDICINE

## 2024-08-20 PROCEDURE — 25000003 PHARM REV CODE 250: Performed by: PSYCHIATRY & NEUROLOGY

## 2024-08-20 RX ORDER — VENLAFAXINE HYDROCHLORIDE 37.5 MG/1
37.5 CAPSULE, EXTENDED RELEASE ORAL DAILY
Status: DISCONTINUED | OUTPATIENT
Start: 2024-08-20 | End: 2024-08-22

## 2024-08-20 RX ADMIN — DOCUSATE SODIUM 100 MG: 100 CAPSULE, LIQUID FILLED ORAL at 08:08

## 2024-08-20 RX ADMIN — VENLAFAXINE HYDROCHLORIDE 37.5 MG: 37.5 CAPSULE, EXTENDED RELEASE ORAL at 04:08

## 2024-08-20 RX ADMIN — VENLAFAXINE HYDROCHLORIDE 150 MG: 150 CAPSULE, EXTENDED RELEASE ORAL at 08:08

## 2024-08-20 RX ADMIN — NICOTINE 1 PATCH: 21 PATCH, EXTENDED RELEASE TRANSDERMAL at 08:08

## 2024-08-20 RX ADMIN — ATORVASTATIN CALCIUM 80 MG: 40 TABLET, FILM COATED ORAL at 08:08

## 2024-08-20 RX ADMIN — FAMOTIDINE 20 MG: 20 TABLET ORAL at 04:08

## 2024-08-20 RX ADMIN — LEVOTHYROXINE SODIUM 88 MCG: 0.09 TABLET ORAL at 06:08

## 2024-08-20 RX ADMIN — PREGABALIN 150 MG: 150 CAPSULE ORAL at 08:08

## 2024-08-20 RX ADMIN — CLOPIDOGREL BISULFATE 75 MG: 75 TABLET ORAL at 08:08

## 2024-08-20 RX ADMIN — MIRTAZAPINE 15 MG: 15 TABLET, FILM COATED ORAL at 08:08

## 2024-08-20 RX ADMIN — MONTELUKAST 10 MG: 10 TABLET, FILM COATED ORAL at 08:08

## 2024-08-20 NOTE — PROGRESS NOTES
HD # 6    64-year-old white female whose remains exquisitely constricted overall affect.  She was this time shows an overall lack of spontaneity appears to be quite downcast.  She was this time does not verbalize any active statements desires to end her life.  Although not stating this she clearly remains overwhelmed.  She was doses this time she continues to obsess and worry about the need for changes upon return home.  Discussed with her the need to make referrals to IOP level of care upon discharge.    Patient was this time is educated that she was going to have to work diligently to try to change choices and behaviors.  She was acknowledges.  Confronted about her overall issues in which she has been gambling and placing stability risk.  Hopefully she will make a great effort in terms of following up with outpatient follow up for mental health and her impulse and compulsive behavior.      She was still appears to be quite downcast and sad and overall appearance.  Although not verbalize any statements self-injurious she clearly he was not adaptable when it comes to self-management overall thoughts to self injure.      On mental status examination:  64-year-old white female whose affect remains constricted.  Whose speech at this time was slow and stilted.  His thought processes this time were essentially linear and could be tracked.  His thought content demonstrated no clear evidence of any auditory or visual hallucinations.  Patient was making no active statements to harm self.  Patient was made no active statements to harm others.  She had not show delusions or distortions in his perceptions at this time.  Her insight and judgment this time were deemed to be limited.  Orientation was intact.  She was continues to ruminate about current life stress and past events    Impression:  Suicide ideation   Major depressive disorder recurrent severe without psychotic features   Compulsive disorder not otherwise  specified  Nonadherence     Estimated continued hospitalization 48-96 hours     Indications: Patient was this time still has not shown full stabilization medication trials continues to be downcast and sad and we will look towards other upward titration of medication doses by increasing doses of Effexor XR     Recommendations:   1. Continue trials of Remeron at 15 mg p.o. q.h.s. as patient was does identify having slept better last night.    2.  We will inch up on doses of Effexor XR to 187.5 mg p.o. q.day. we will set target doses to be 225 mg p.o. q.day   3.  Requested that  have family session with .  We will be strongly recommended that family session occur such he was she was in terms of relationship reached discussed.  4.  We will request that  facilitate getting patient was referred into IOP level of care post discharge.

## 2024-08-20 NOTE — NURSING
"Daily Nursing Note:      Behavior:    Patient (Magda Wahl is a 64 y.o. female, : 1960, MRN: [id number]) demonstrating an affect that was sad, flat, and anxious. Magda demonstrating mood that is depressed and anxious. Magda had an appearance that was clean. Magda denies suicidal ideation. Magda denies suicide plan. Magda denies homicidal ideation. Magda denies hallucinations.    Magda's  height is 5' 3" (1.6 m) and weight is 62 kg (136 lb 11 oz). Her oral temperature is 98.2 °F (36.8 °C). Her blood pressure is 136/77 and her pulse is 71. Her respiration is 16 and oxygen saturation is 99%.     Rolos last BM was noted on: 24 (small)    Intervention:    Encourage Magda to perform self-hygiene, grooming, and changing of clothing. Monitor Magda's behavior and program compliance. Monitor Magda for suicidal ideation, homicidal ideation, sleep disturbance, and hallucinations. Encourage Magda to eat all portions of meals and assess for meal preferences. Monitor Magda for intake and output to ensure hydration. Notify the Physician for any medication refusal and any change in patient condition.      Response:    Magda verbalizes understand of unit process and procedures. Magda is compliant with medications, no adverse effects observed or reported..      Plan:     Continue to monitor per MD orders; maintain patient safety. Q 15 min safety checks.  "

## 2024-08-20 NOTE — NURSING
X-ray results in revealing residual feces and gas with no evidence of ileus or obstruction. Dr Velazquez notified of results.

## 2024-08-20 NOTE — PROGRESS NOTES
Recreation Therapy Progress Note    Date: 8 20 2024    Time: 10:00 am group    Group Title: creative expression    Mood: less anxious and less depressed     Behavior: participated in class     Affect: less anxious and less depressed    Speech: pt talking more in group    Cognition: alert  more and focused more.     Participation Level: pt released a lot of feeling though art and music class. Pt has a lot of mixed emotions. Pt completing  tx plan goals well.    Intervention:cont rt services          Recreation Therapist   Signature: chad lewis MA CTRS

## 2024-08-20 NOTE — GROUP NOTE
Group Psychotherapy       Group Focus: What Depression Looks Like      Number of patients in attendance: 9      Group Start Time: 1945  Group End Time:  2030  Groups Date: 8/19/2024  Group Topic:  Behavioral Health  Group Department: [facility]  Group Facilitators:  Roma Bowles RN  _____________________________________________________________________    Patient Name: Magda Wahl  MRN: [id number]  Patient Class: IP- Psych   Admission Date\Time: 8/14/2024  4:40 PM  Hospital Length of Stay: 5  Primary Care Provider: Omar Barboza APRN     Referred by: Behavioral Medicine Unit Treatment Team     Target symptoms: Depression     Patient's response to treatment: Active Listening     Progress toward goals: Progressing adequately     Interval History: actively listening, some verbal participation, not much interaction or peer feedback     Diagnosis: Major depressive disorder without psychotic features     Plan: Continue treatment on BMU

## 2024-08-20 NOTE — GROUP NOTE
Group Psychotherapy       Group Focus: Psychodynamic Group Psychotherapy      Number of patients in attendance: 6    Group Start Time: 0900  Group End Time:  0945  Groups Date: 8/20/2024  Group Topic:  Behavioral Health  Group Department: [facility]  Group Facilitators:  Obey Chadwick LCSW  _____________________________________________________________________    Patient Name: Magda Wahl  MRN: [id number]  Patient Class: IP- Psych   Admission Date\Time: 8/14/2024  4:40 PM  Hospital Length of Stay: 6  Primary Care Provider: Omar Barboza APRN     Referred by: Behavioral Medicine Unit Treatment Team     Target symptoms: Depression and Anxiety     Patient's response to treatment: Active Listening and Self-disclosure     Progress toward goals: Progressing adequately     Interval History: Group discussed moving on from your own history and making changes to identified negative behaviors.     Diagnosis:      Plan: Continue treatment on BMU

## 2024-08-20 NOTE — NURSING
"Daily Nursing Note:      Behavior:  Patient (Magda Wahl is a 64 y.o. female, : 1960, MRN: [id number]) demonstrating an affect that was sad and anxious. Magda demonstrating mood that is depressed and anxious. Magda had an appearance that was clean. Magda denies suicidal ideation. Magda denies suicide plan. Magda denies homicidal ideation. Magda denies hallucinations.    Magda's  height is 5' 3" (1.6 m) and weight is 62 kg (136 lb 11 oz). Her oral temperature is 98.2 °F (36.8 °C). Her blood pressure is 132/76 and her pulse is 80. Her respiration is 16 and oxygen saturation is 98%. Rolos last BM was noted on: 2024.      Intervention:  Encouraged Magda to perform self-hygiene, grooming, and changing of clothing. Monitored Magda's behavior and program compliance. Monitored Magda for suicidal ideation, homicidal ideation, sleep disturbance, and hallucinations. Encouraged Magda to eat all portions of meals and assessed for meal preferences. Monitored Magda for intake and output to ensure hydration. Will notify the Physician for any medication refusal and any change in patient condition.      Response:  Magda verbalizes some anxiety and depression but denies suicidal ideations.  States she "hasn't felt suicidal at all." Rates Anxiety 4/10 and rates depression 5/10.  Remains somewhat flat, however she did participate verbally in group.  Did not interact with peers throughout the group.        Plan:   Continue to monitor per MD orders; maintain patient safety with safety checks Q15 minutes and prn.   "

## 2024-08-20 NOTE — PLAN OF CARE
Problem: Depression  Goal: Improved Mood  Outcome: Progressing  Intervention: Monitor and Manage Depressive Symptoms  Flowsheets (Taken 8/19/2024 1935)  Supportive Measures:   active listening utilized   verbalization of feelings encouraged

## 2024-08-20 NOTE — PROGRESS NOTES
Recreation Therapy Progress Note    Date: 8 20 2024    Time: 1400    Group Title: creative expression    Mood: less depressed and less anxious    Behavior: participating in group with more energy    Affect: less depressed and less anxious     Speech: pt talking more and interacting more verbally in group    Cognition: focused more in group    Participation Level: 100%    Intervention:cont rt services.           Recreation Therapist   Signature: chad lewis MA CTRS

## 2024-08-21 LAB
POCT GLUCOSE: 113 MG/DL (ref 70–110)
POCT GLUCOSE: 172 MG/DL (ref 70–110)

## 2024-08-21 PROCEDURE — 11400000 HC PSYCH PRIVATE ROOM

## 2024-08-21 PROCEDURE — 25000003 PHARM REV CODE 250: Performed by: PSYCHIATRY & NEUROLOGY

## 2024-08-21 PROCEDURE — S4991 NICOTINE PATCH NONLEGEND: HCPCS | Performed by: PSYCHIATRY & NEUROLOGY

## 2024-08-21 PROCEDURE — 25000003 PHARM REV CODE 250: Performed by: INTERNAL MEDICINE

## 2024-08-21 RX ADMIN — CLOPIDOGREL BISULFATE 75 MG: 75 TABLET ORAL at 08:08

## 2024-08-21 RX ADMIN — FAMOTIDINE 20 MG: 20 TABLET ORAL at 04:08

## 2024-08-21 RX ADMIN — VENLAFAXINE HYDROCHLORIDE 150 MG: 150 CAPSULE, EXTENDED RELEASE ORAL at 08:08

## 2024-08-21 RX ADMIN — ATORVASTATIN CALCIUM 80 MG: 40 TABLET, FILM COATED ORAL at 08:08

## 2024-08-21 RX ADMIN — LEVOTHYROXINE SODIUM 88 MCG: 0.09 TABLET ORAL at 06:08

## 2024-08-21 RX ADMIN — PREGABALIN 150 MG: 150 CAPSULE ORAL at 08:08

## 2024-08-21 RX ADMIN — LINACLOTIDE 145 MCG: 145 CAPSULE, GELATIN COATED ORAL at 06:08

## 2024-08-21 RX ADMIN — DOCUSATE SODIUM 100 MG: 100 CAPSULE, LIQUID FILLED ORAL at 08:08

## 2024-08-21 RX ADMIN — NICOTINE 1 PATCH: 21 PATCH, EXTENDED RELEASE TRANSDERMAL at 08:08

## 2024-08-21 RX ADMIN — MIRTAZAPINE 15 MG: 15 TABLET, FILM COATED ORAL at 08:08

## 2024-08-21 RX ADMIN — MONTELUKAST 10 MG: 10 TABLET, FILM COATED ORAL at 08:08

## 2024-08-21 RX ADMIN — VENLAFAXINE HYDROCHLORIDE 37.5 MG: 37.5 CAPSULE, EXTENDED RELEASE ORAL at 08:08

## 2024-08-21 NOTE — PLAN OF CARE
Problem: Adult Behavioral Health Plan of Care  Goal: Plan of Care Review  Outcome: Progressing  Goal: Patient-Specific Goal (Individualization)  Outcome: Progressing  Goal: Adheres to Safety Considerations for Self and Others  Outcome: Progressing  Intervention: Develop and Maintain Individualized Safety Plan  Flowsheets (Taken 8/20/2024 2035)  Safety Measures: suicide assessment completed  Goal: Absence of New-Onset Illness or Injury  Outcome: Progressing  Intervention: Identify and Manage Fall Risk  Flowsheets (Taken 8/20/2024 2035)  Safety Measures: suicide assessment completed  Goal: Optimized Coping Skills in Response to Life Stressors  Outcome: Progressing  Intervention: Promote Effective Coping Strategies  Flowsheets (Taken 8/20/2024 2035)  Supportive Measures:   verbalization of feelings encouraged   active listening utilized   positive reinforcement provided

## 2024-08-21 NOTE — PATIENT CARE CONFERENCE
Family session with pt and .  We talked a lot about financial disputes and how they could communicate better about those things and utilize budgeting practices to ensure that both understand where they are at .  We also talked about the importance of communication in a marriage and about hinking about how behavior choices affect one another.  After he hung up Magda talked about his declining cognitive ability and she and I discussed to role of caregivers and the importance of self care.  He will provide transport home on Friday 8/23/24.

## 2024-08-21 NOTE — NURSING
"Daily Nursing Note:      Behavior:  Patient (Mgada Wahl is a 64 y.o. female, : 1960, MRN: [id number]) demonstrating an affect that was sad, flat, and anxious. Magda demonstrating mood that is depressed and anxious. Magda had an appearance that was clean. Magda denies suicidal ideation. Magda denies suicide plan. Magda denies homicidal ideation. Magda denies hallucinations.    Magda's  height is 5' 3" (1.6 m) and weight is 62 kg (136 lb 11 oz). Her oral temperature is 98.2 °F (36.8 °C). Her blood pressure is 136/77 and her pulse is 71. Her respiration is 16 and oxygen saturation is 99%.     Rolos last BM was noted on: 2024.      Intervention:  Encouraged Magda to perform self-hygiene, grooming, and changing of clothing. Monitored Magda's behavior and program compliance. Monitored Magda for suicidal ideation, homicidal ideation, sleep disturbance, and hallucinations. Encouraged Magda to eat all portions of meals and assesse for meal preferences. Monitored Magda for intake and output to ensure hydration. Will notify the Physician for any medication refusal and any change in patient condition.      Response:  Magda displays understanding of unit process and procedures. Magda reports no bowel movements this evening.  One small one documented this a.m.  She endorses some anxiety and depression.  Rates anxiety 3/10 and depression 4/10.  Denies any thoughts of wanting to harm herself or anyone else.  Participated well in group this evening with great feedback to peers.        Plan:   Continue to monitor per MD orders; maintain patient safety with safety checks Q15 minutes and prn.   "

## 2024-08-21 NOTE — PROGRESS NOTES
Recreation Therapy Progress Note    Date: 8 21 2024    Time: 1400    Group Title: leisure skills    Mood: depressed a little less and less anxiety    Behavior: participated in group    Affect: depressed and anxious but  a little less today pt states.     Speech: pt talking more in group    Cognition: alert and focused on assignment on emotions. Pt talked about her feelings to group.    Participation Level: 100% in group    Intervention:cont rt services.           Recreation Therapist   Signature: chad lewis MA CTRS

## 2024-08-21 NOTE — PROGRESS NOTES
HD # 7    64-year-old white female who today he was interviewed.  Today he was the 1st day that she was shows in his significant range of affect.  She was states she was finally has a bowel movement utilization of trials of Linzess at this time.    Mood wise she was states that this time she was anxious about family session today.  Questioned her how she was anticipate using the session she does not know.  She was understands that she was to change some overall choices and behaviors forward.  She was this time does not fully appreciate how to go about this.  Have continued make interpretations to her about the benefits of delusions forward to an [facility] level of care.  Hopefully she was exercise the option.      On mental status examination: 64-year-old white female who who at this time presents this time with evidence of ongoing difficulties in terms of persistent ongoing difficulties with low mood.  She continues to ruminate about past events.  She made no statements to harm self.  She made no statements to harm others.  She denied any intention to harm others.  There continued ruminations about current life events.  She did not acknowledge any paranoia or suspiciousness.  She was states that this time she does worry and ruminate that her  has a cognitive slippage.  Her insight and judgment deemed to be limited.      Impression:  Suicide ideation   Major depressive disorder recurrent severe without psychotic features   Compulsive disorder specified     Estimated continued hospitalization 48 hours     Indications: Patient was time continues to warrant further individuals with family he was this time did begun to show resolution adverse effects with medications.    Recommendations:   1.  Continue trials of Linzess this time.  2.  Increase doses of Effexor XR to 225 mg p.o. q.day.    3.  Continue trials of Remeron 15 mg p.o. q.h.s..    4. Continue with his individuals and reassess.

## 2024-08-21 NOTE — NURSING
"Daily Nursing Note:      Behavior:    Patient (Magda Wahl is a 64 y.o. female, : 1960, MRN: [id number]) demonstrating an affect that was blunted. Magda demonstrating mood that is anxious. Magda had an appearance that was clean. Magda denies suicidal ideation. Magda denies suicide plan. Magda denies homicidal ideation. Magda denies hallucinations.    Magda's  height is 5' 3" (1.6 m) and weight is 62 kg (136 lb 11 oz). Her oral temperature is 98.1 °F (36.7 °C). Her blood pressure is 144/75 (abnormal) and her pulse is 66. Her respiration is 16 and oxygen saturation is 98%.     Rolos last BM was noted on: 2024, small.     Intervention:    Encourage Magda to perform self-hygiene, grooming, and changing of clothing. Monitor Magda's behavior and program compliance. Monitor Magda for suicidal ideation, homicidal ideation, sleep disturbance, and hallucinations. Encourage Magda to eat all portions of meals and assess for meal preferences.  Ensure hydration by offering fluids. Notify the Physician for any medication refusal and any change in patient condition.      Response:    Magda verbalizes understand of unit process and procedures. Magda is compliant with medications and treatment.    Plan:     Continue to monitor per MD orders; maintain patient safety. Q15min safety checks. Suicide precautions.   "

## 2024-08-21 NOTE — PROGRESS NOTES
Inpatient Nutrition Assessment    Admit Date: 8/14/2024   Total duration of encounter: 7 days   Patient Age: 64 y.o.    Nutrition Recommendation/Prescription     Continue 2000 Diabetic/Double Protein diet as tolerated  Weigh weekly    Communication of Recommendations: reviewed with nurse    Nutrition Assessment     Malnutrition Assessment/Nutrition-Focused Physical Exam    Malnutrition Context: other (see comments) (Does not meet criteria) (08/21/24 1105)                                                           A minimum of two characteristics is recommended for diagnosis of either severe or non-severe malnutrition.    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 Medrano, gaps in fill hx,   Obstructive sleep apnea syndrome [G47.33] 07/03/2023 Improved with CPAP   Degenerative disc disease, lumbar [M51.36] 07/03/2023 Fall in 2010 at work, worked up by Dr. Soto and workman's comp.  Deemed disabled.  Completed injections and PT in the past. Did not tolerate gabapentin, now takes lyrica only once daily PRN, filled per Dr. Mendoza.   Type 2 diabetes mellitus [E11.9] 07/03/2023 Established with Dr. Franklin for vision.  Last prescribed metformin 1000 daily. Stopped d/t side effects. Begin ozempic   Vitamin D deficiency [E55.9] 07/03/2023    Hypothyroidism [E03.9] 07/03/2023 Gaps in fill hx but no dose adjustment in over a year. Continue 88mcg.   Mild chronic obstructive pulmonary disease [J44.9] 07/03/2023 2018 pft showed mild restriction. Patient did not do PFT in 2022 as ordered.   Mitral valve insufficiency [I34.0] 07/03/2023 Taking plavix   Depressive disorder [F32.A] 07/03/2023 Last filled cymbalta sept 2022   Bipolar disorder [F31.9] 07/03/2023 Allergy to zyprexa, abilify ineffective. Last  "took vraylar 1.5- has not filled in 9 months, declines medication   Tobacco user [Z72.0] 07/03/2023 Down to 1/2 ppd. Declines medication   Chronic venous insufficiency [I87.2] 08/20/2018    Sleep difficulties [G47.9]     History of psychiatric hospitalization [Z86.59]     Hx of psychiatric care [Z92.89]     Psychiatric problem [F99]         Scheduled Medications:  atorvastatin, 80 mg, Daily  clopidogreL, 75 mg, Daily  docusate sodium, 100 mg, BID  famotidine, 20 mg, Daily  levothyroxine, 88 mcg, Before breakfast  linaCLOtide, 145 mcg, Before breakfast  mirtazapine, 15 mg, QHS  montelukast, 10 mg, QHS  nicotine, 1 patch, Daily  pregabalin, 150 mg, BID  venlafaxine, 150 mg, Daily  venlafaxine, 37.5 mg, Daily    Continuous Infusions:   PRN Medications:  acetaminophen, 650 mg, Q6H PRN  aluminum-magnesium hydroxide-simethicone, 30 mL, Q6H PRN  bisacodyL, 10 mg, Daily PRN  glucagon (human recombinant), 1 mg, PRN  glucose, 16 g, PRN  glucose, 24 g, PRN  hydrOXYzine pamoate, 50 mg, Q6H PRN  ibuprofen, 400 mg, Q6H PRN  insulin aspart U-100, 0-10 Units, BID PRN  LORazepam, 1 mg, Q6H PRN    Calorie Containing IV Medications: no significant kcals from medications at this time    Recent Labs   Lab 08/15/24  0440   TRIG 70   HGBA1C 6.9     Nutrition Orders:  Diet diabetic Double Protein; 2000 Calorie; PEC/CEC - Styrofoam      Appetite/Oral Intake: good/% of meals  Factors Affecting Nutritional Intake: none identified  Social Needs Impacting Access to Food: none identified  Food/Protestant/Cultural Preferences: none reported  Food Allergies: none reported  Last Bowel Movement: 08/20/24 (small)  Wound(s):  intact    Comments    (8/21) Pt with good appetite and intake. Consumes 100% of meals. No reports of N/V/C/D. No issues chewing or swallowing difficulties. Med/labs reviewed. CBW: 136# (8/18) gained 3# in 4 days    Anthropometrics    Height: 5' 3" (160 cm), Height Method: Measured  Last Weight: 62 kg (136 lb 11 oz) (08/18/24 " 0616), Weight Method: Standard Scale  BMI (Calculated): 24.2  BMI Classification: normal (BMI 18.5-24.9)     Ideal Body Weight (IBW), Female: 115 lb     % Ideal Body Weight, Female (lb): 115.98 %                             Usual Weight Provided By: patient denies unintentional weight loss    Wt Readings from Last 5 Encounters:   08/18/24 62 kg (136 lb 11 oz)   08/13/24 63.5 kg (140 lb)   08/06/24 61.2 kg (135 lb)   05/29/24 58.1 kg (128 lb)   03/11/24 57.5 kg (126 lb 12.8 oz)     Weight Change(s) Since Admission:   Wt Readings from Last 1 Encounters:   08/18/24 0616 62 kg (136 lb 11 oz)   08/14/24 1640 60.5 kg (133 lb 6.1 oz)   Admit Weight: 60.5 kg (133 lb 6.1 oz) (08/14/24 1640), Weight Method: Standard Scale    Estimated Needs    Weight Used For Calorie Calculations: 62 kg (136 lb 11 oz)  Energy Calorie Requirements (kcal): 1550 kcal (25 kcal/kg/BW)  Energy Need Method: Kcal/kg  Weight Used For Protein Calculations: 62 kg (136 lb 11 oz)  Protein Requirements: 81 gm (1.3 gm/kg/BW)  Fluid Requirements (mL): 1550 ml (1 ml/kcal)  CHO Requirement: 194 gm/d (50%)     Enteral Nutrition     Patient not receiving enteral nutrition at this time.    Parenteral Nutrition     Patient not receiving parenteral nutrition support at this time.    Evaluation of Received Nutrient Intake    Calories: meeting estimated needs  Protein: meeting estimated needs    Patient Education     Not applicable.    Nutrition Diagnosis     No nutrition dx at this time    Nutrition Interventions     Intervention(s): general/healthful diet and collaboration with other providers    Goal: Maintain weight throughout hospitalization. (new)      Nutrition Goals & Monitoring     Dietitian will monitor: food and beverage intake, weight, electrolyte/renal panel, glucose/endocrine profile, and gastrointestinal profile  Discharge planning: continue consistent carb diet  Nutrition Risk/Follow-Up: low (follow-up in 5-7 days)   Please consult if re-assessment  needed sooner.

## 2024-08-21 NOTE — GROUP NOTE
Group Psychotherapy       Group Focus: Relationship Dynamics      Number of patients in attendance: 7    Purpose:  Identify important qualities in a friendship    This group activity is an opportunity to express what is important to each one of them as a friend.  Discussion held on why they feel certain qualities are important in a friend and what they learned from this activity.  At the end of the group, each patient was asked to leave some parting words of wisdom regarding friendships.      Group Start Time: 1950  Group End Time:  2030  Groups Date: 8/20/2024  Group Topic:  Behavioral Health  Group Department: [facility]  Group Facilitators:  Roma Bowles RN  _____________________________________________________________________    Patient Name: Magda Wahl  MRN: [id number]  Patient Class: IP- Psych   Admission Date\Time: 8/14/2024  4:40 PM  Hospital Length of Stay: 6  Primary Care Provider: Omar Barboza APRN     Referred by: Behavioral Medicine Unit Treatment Team     Target symptoms: friendship qualities     Patient's response to treatment: Active Listening, Self-disclosure, and Feedback given to another patient     Progress toward goals: Progressing adequately     Interval History: verbally participating, interacting well with peers.  Lots of feedback     Diagnosis: Major Depressive disorder     Plan: Continue treatment on BMU

## 2024-08-21 NOTE — PROGRESS NOTES
Recreation Therapy Progress Note    Date: 8 21 2024    Time: 10:00 am group    Group Title: creative expression    Mood: hopeful and less anxious . Pt also less depressed    Behavior: participated in group with more energy and more self worth    Affect: less anxious and more calm    Speech: pt talking more in group    Cognition: alert  and focused on cognitive game with peers and staff.    Participation Level: 100%     Intervention:cont rt services.          Recreation Therapist   Signature: chad lewis MA CTRS

## 2024-08-21 NOTE — NURSING
" refusing s/s insulin coverage. Education provided. " No, it's not that high and 2 units is not a lot. I ate a bag of chips"    "

## 2024-08-22 LAB
POCT GLUCOSE: 128 MG/DL (ref 70–110)
POCT GLUCOSE: 132 MG/DL (ref 70–110)

## 2024-08-22 PROCEDURE — S4991 NICOTINE PATCH NONLEGEND: HCPCS | Performed by: PSYCHIATRY & NEUROLOGY

## 2024-08-22 PROCEDURE — 11400000 HC PSYCH PRIVATE ROOM

## 2024-08-22 PROCEDURE — 25000003 PHARM REV CODE 250: Performed by: INTERNAL MEDICINE

## 2024-08-22 PROCEDURE — 25000003 PHARM REV CODE 250: Performed by: PSYCHIATRY & NEUROLOGY

## 2024-08-22 RX ORDER — VENLAFAXINE HYDROCHLORIDE 150 MG/1
150 CAPSULE, EXTENDED RELEASE ORAL DAILY
Qty: 30 CAPSULE | Refills: 1 | Status: SHIPPED | OUTPATIENT
Start: 2024-08-23 | End: 2024-10-22

## 2024-08-22 RX ORDER — VENLAFAXINE HYDROCHLORIDE 75 MG/1
75 CAPSULE, EXTENDED RELEASE ORAL DAILY
Status: DISCONTINUED | OUTPATIENT
Start: 2024-08-23 | End: 2024-08-23 | Stop reason: HOSPADM

## 2024-08-22 RX ORDER — VENLAFAXINE HYDROCHLORIDE 75 MG/1
75 CAPSULE, EXTENDED RELEASE ORAL DAILY
Qty: 30 CAPSULE | Refills: 1 | Status: SHIPPED | OUTPATIENT
Start: 2024-08-23 | End: 2024-10-22

## 2024-08-22 RX ORDER — IBUPROFEN 200 MG
1 TABLET ORAL DAILY
Qty: 21 PATCH | Refills: 0 | Status: SHIPPED | OUTPATIENT
Start: 2024-08-23 | End: 2024-09-13

## 2024-08-22 RX ORDER — DOCUSATE SODIUM 100 MG/1
100 CAPSULE, LIQUID FILLED ORAL 2 TIMES DAILY
Qty: 60 CAPSULE | Refills: 0 | Status: SHIPPED | OUTPATIENT
Start: 2024-08-22 | End: 2024-10-21

## 2024-08-22 RX ORDER — MIRTAZAPINE 15 MG/1
15 TABLET, FILM COATED ORAL NIGHTLY
Qty: 30 TABLET | Refills: 1 | Status: SHIPPED | OUTPATIENT
Start: 2024-08-22 | End: 2024-10-21

## 2024-08-22 RX ADMIN — LINACLOTIDE 145 MCG: 145 CAPSULE, GELATIN COATED ORAL at 06:08

## 2024-08-22 RX ADMIN — DOCUSATE SODIUM 100 MG: 100 CAPSULE, LIQUID FILLED ORAL at 08:08

## 2024-08-22 RX ADMIN — CLOPIDOGREL BISULFATE 75 MG: 75 TABLET ORAL at 08:08

## 2024-08-22 RX ADMIN — MONTELUKAST 10 MG: 10 TABLET, FILM COATED ORAL at 08:08

## 2024-08-22 RX ADMIN — MIRTAZAPINE 15 MG: 15 TABLET, FILM COATED ORAL at 08:08

## 2024-08-22 RX ADMIN — VENLAFAXINE HYDROCHLORIDE 37.5 MG: 37.5 CAPSULE, EXTENDED RELEASE ORAL at 08:08

## 2024-08-22 RX ADMIN — PREGABALIN 150 MG: 150 CAPSULE ORAL at 08:08

## 2024-08-22 RX ADMIN — FAMOTIDINE 20 MG: 20 TABLET ORAL at 04:08

## 2024-08-22 RX ADMIN — ATORVASTATIN CALCIUM 80 MG: 40 TABLET, FILM COATED ORAL at 08:08

## 2024-08-22 RX ADMIN — NICOTINE 1 PATCH: 21 PATCH, EXTENDED RELEASE TRANSDERMAL at 08:08

## 2024-08-22 RX ADMIN — VENLAFAXINE HYDROCHLORIDE 150 MG: 150 CAPSULE, EXTENDED RELEASE ORAL at 08:08

## 2024-08-22 RX ADMIN — LEVOTHYROXINE SODIUM 88 MCG: 0.09 TABLET ORAL at 06:08

## 2024-08-22 NOTE — NURSING
"Daily Nursing Note:      Behavior:    Patient (Magda Wahl is a 64 y.o. female, : 1960, MRN: [id number]) demonstrating an affect that was anxious and ...blunted. Magda demonstrating mood that is anxious. Magda had an appearance that was clean. Magda denies suicidal ideation. Magda denies suicide plan. Magda denies homicidal ideation. Magda denies hallucinations.    Magda's  height is 5' 3" (1.6 m) and weight is 62 kg (136 lb 11 oz). Her oral temperature is 98.2 °F (36.8 °C). Her blood pressure is 137/72 and her pulse is 72. Her respiration is 16 and oxygen saturation is 99%.       Intervention:    Encourage Magda to perform self-hygiene, grooming, and changing of clothing. Monitor Magda's behavior and program compliance. Monitor Magda for suicidal ideation, homicidal ideation, sleep disturbance, and hallucinations. Encourage Magda to eat all portions of meals and assess for meal preferences. Monitor Magda for intake and output to ensure hydration. Notify the Physician/Physician Assistant/Advance Practice Registered Nurse (MD/PA/APRN) for any medication refusal and any change in patient condition.      Response:    Magda verbalizes understand of unit process and procedures. Magda is calm and cooperative on the unit, speech is clear, interacts well with staff and peers, ate snack and compliant with PM medications, no complaints at this time, will continue to monitor.      Plan:     Continue to monitor per MD/PA/APRN orders; maintain patient safety.  "

## 2024-08-22 NOTE — GROUP NOTE
Group Psychotherapy       Group Focus: Psychodynamic Group Psychotherapy      Number of patients in attendance: 5    Group Start Time: 0900  Group End Time:  0945  Groups Date: 8/22/2024  Group Topic:  Behavioral Health  Group Department: [facility]  Group Facilitators:  Obey Chadwick LCSW  _____________________________________________________________________    Patient Name: Magda Wahl  MRN: [id number]  Patient Class: IP- Psych   Admission Date\Time: 8/14/2024  4:40 PM  Hospital Length of Stay: 8  Primary Care Provider: Omar Barboza APRN     Referred by: Behavioral Medicine Unit Treatment Team     Target symptoms: Mood Disorder     Patient's response to treatment: Active Listening and Self-disclosure     Progress toward goals: Progressing adequately     Interval History: Group discussed the connection between behavior and consequences and choosing behaviors that get the desired consequences.     Diagnosis:      Plan: Continue treatment on BMU

## 2024-08-22 NOTE — PROGRESS NOTES
Recreation Therapy Progress Note    Date: 8 22 2024    Time: 10:00 am group    Group Title: creative expression    Mood: less depressed and less anxious    Behavior: participating in group     Affect: less anxious and less depressed    Speech: pt talking in group     Cognition: alert and focused on assignment    Participation Level: 100% art and music helps her to reduce anxiety and enhance self worth    Intervention:pt will cont rt services          Recreation Therapist   Signature: chad lewis MA CTRS

## 2024-08-22 NOTE — PROGRESS NOTES
Recreation Therapy Progress Note    Date: 8 22 2024    Time: 1400     Group Title: leisure skills    Mood: less depressed and less anxious  pt states but still worries    Behavior: participated in group    Affect: hopeful she states    Speech: normal and talking more in group    Cognition: alert and focused on assignment    Participation Level: 100% on work assignments . Pt states that art  and music helps her to relax.    Intervention:  Cont rt services.         Recreation Therapist   Signature: chad lewis MA CTRS

## 2024-08-22 NOTE — PLAN OF CARE
Problem: Depression  Goal: Improved Mood  Outcome: Progressing     Problem: Diabetes Comorbidity  Goal: Blood Glucose Level Within Targeted Range  Outcome: Progressing

## 2024-08-22 NOTE — NURSING
"Daily Nursing Note:      Behavior:    Patient (Magda Wahl is a 64 y.o. female, : 1960, MRN: [id number]) demonstrating an affect that was constricted. .... Magda demonstrating mood that is depressed and anxious. Magda had an appearance that was clean. Magda denies suicidal ideation. Magda denies suicide plan. Magda denies homicidal ideation. Magda denies hallucinations.    Magda's  height is 5' 3" (1.6 m) and weight is 62 kg (136 lb 11 oz). Her temperature is 97.1 °F (36.2 °C). Her blood pressure is 129/74 and her pulse is 73. Her respiration is 20 and oxygen saturation is 98%.     Rolos last BM was noted on: 2024.      Intervention:    Encourage Magda to perform self-hygiene, grooming, and changing of clothing. Monitor Magda's behavior and program compliance. Monitor Magda for suicidal ideation, homicidal ideation, sleep disturbance, and hallucinations. Encourage Magda to eat all portions of meals and assess for meal preferences.  Ensure hydration by offering fluids. Notify the Physician for any medication refusal and any change in patient condition.      Response:    Magda verbalizes understand of unit process and procedures. Magda is compliant with medications and treatment.    Plan:     Continue to monitor per MD orders; maintain patient safety. Q15min safety checks. Suicide precautions.  "

## 2024-08-22 NOTE — NURSING
2100.AAO and depressed. Displayed no c/o. Admitted to Depression=4 and Anxiety=1. Denied any SI/HI/AH/VH. Accepted all PM meds as prescribed by Provider. Resting in bed quietly.

## 2024-08-22 NOTE — GROUP NOTE
Education Group       Group Focus: Offered group to increase knowledge of medicine, side effects, safety and importance of compliance.       Number of patients in attendance: 4    Group Start Time: 1200  Group End Time:  1245  Groups Date: 8/22/2024  Group Topic:  Behavioral Health  Group Department: [facility]  Group Facilitators:  Luz Valenzuela LPN  _____________________________________________________________________    Patient Name: Magda Wahl  MRN: [id number]  Patient Class: IP- Psych   Admission Date\Time: 8/14/2024  4:40 PM  Hospital Length of Stay: 8  Primary Care Provider: Omar Barboza APRN     Referred by: Behavioral Medicine Unit Treatment Team     Target symptoms: Depression     Patient's response to treatment: Active Listening, Self-disclosure, Frequent Questions, and Feedback given to another patient     Progress toward goals: Progressing well     Interval History:  Verbalized understanding. Good participation.      Diagnosis: MDD     Plan: Continue treatment on BMU

## 2024-08-23 VITALS
RESPIRATION RATE: 20 BRPM | TEMPERATURE: 98 F | BODY MASS INDEX: 24.22 KG/M2 | OXYGEN SATURATION: 98 % | WEIGHT: 136.69 LBS | SYSTOLIC BLOOD PRESSURE: 118 MMHG | DIASTOLIC BLOOD PRESSURE: 75 MMHG | HEART RATE: 73 BPM | HEIGHT: 63 IN

## 2024-08-23 LAB — POCT GLUCOSE: 131 MG/DL (ref 70–110)

## 2024-08-23 PROCEDURE — 25000003 PHARM REV CODE 250: Performed by: INTERNAL MEDICINE

## 2024-08-23 PROCEDURE — 25000003 PHARM REV CODE 250: Performed by: PSYCHIATRY & NEUROLOGY

## 2024-08-23 RX ADMIN — PREGABALIN 150 MG: 150 CAPSULE ORAL at 08:08

## 2024-08-23 RX ADMIN — LEVOTHYROXINE SODIUM 88 MCG: 0.09 TABLET ORAL at 05:08

## 2024-08-23 RX ADMIN — VENLAFAXINE HYDROCHLORIDE 75 MG: 150 CAPSULE, EXTENDED RELEASE ORAL at 08:08

## 2024-08-23 RX ADMIN — CLOPIDOGREL BISULFATE 75 MG: 75 TABLET ORAL at 08:08

## 2024-08-23 RX ADMIN — ATORVASTATIN CALCIUM 80 MG: 40 TABLET, FILM COATED ORAL at 08:08

## 2024-08-23 RX ADMIN — VENLAFAXINE HYDROCHLORIDE 150 MG: 150 CAPSULE, EXTENDED RELEASE ORAL at 08:08

## 2024-08-23 RX ADMIN — LINACLOTIDE 145 MCG: 145 CAPSULE, GELATIN COATED ORAL at 06:08

## 2024-08-23 RX ADMIN — DOCUSATE SODIUM 100 MG: 100 CAPSULE, LIQUID FILLED ORAL at 08:08

## 2024-08-23 NOTE — PLAN OF CARE
Problem: Diabetes Comorbidity  Goal: Blood Glucose Level Within Targeted Range  Outcome: Progressing     Problem: Depression  Goal: Improved Mood  Outcome: Progressing

## 2024-08-23 NOTE — NURSING
Discharge Note:    Magda Wahl is a 64 y.o. female, : 1960, MRN: [id number], admitted on 2024 for Luis Velazquez MD with a diagnosis of Depression with suicidal ideation [F32.A, R45.632].    Patient discharged home via  on 2024 per physician orders in stable condition. Patient denied suicidal ideation, homicidal ideation, or hallucinations.  Patient medication compliant with no c/o side effects.  Patient was discharged with valuables, personal belongings, prescriptions, discharge instructions, and an educational handout explaining the diagnosis and prescribed medications. Patient verbalized understanding of the discharge instructions and importance of follow-up visits. Patient was escorted out of the facility by [facility].    Patient discharged on the following medications:     Medication List        START taking these medications      docusate sodium 100 MG capsule  Commonly known as: COLACE  Take 1 capsule (100 mg total) by mouth 2 (two) times daily.     linaCLOtide 145 mcg Cap capsule  Commonly known as: LINZESS  Take 1 capsule (145 mcg total) by mouth before breakfast.     mirtazapine 15 MG tablet  Commonly known as: REMERON  Take 1 tablet (15 mg total) by mouth every evening.     nicotine 21 mg/24 hr  Commonly known as: NICODERM CQ  Place 1 patch onto the skin once daily. for 21 days            CHANGE how you take these medications      * venlafaxine 150 MG Cp24  Commonly known as: EFFEXOR-XR  Take 1 capsule (150 mg total) by mouth once daily.  What changed:   medication strength  how much to take     * venlafaxine 75 MG 24 hr capsule  Commonly known as: EFFEXOR-XR  Take 1 capsule (75 mg total) by mouth once daily.  What changed: You were already taking a medication with the same name, and this prescription was added. Make sure you understand how and when to take each.           * This list has 2 medication(s) that are the same as other medications prescribed for you. Read  the directions carefully, and ask your doctor or other care provider to review them with you.                CONTINUE taking these medications      clopidogreL 75 mg tablet  Commonly known as: PLAVIX  Take 1 tablet (75 mg total) by mouth once daily.     famotidine 20 MG tablet  Commonly known as: PEPCID     levothyroxine 88 MCG tablet  Commonly known as: SYNTHROID  Take 1 tablet (88 mcg total) by mouth every morning.     montelukast 10 mg tablet  Commonly known as: SINGULAIR     pregabalin 150 MG capsule  Commonly known as: LYRICA     rosuvastatin 40 MG Tab  Commonly known as: CRESTOR  Take 1 tablet (40 mg total) by mouth Daily.            STOP taking these medications      ARIPiprazole 2 MG Tab  Commonly known as: ABILIFY     azelastine 137 mcg (0.1 %) nasal spray  Commonly known as: ASTELIN     blood sugar diagnostic Strp     blood-glucose meter kit     busPIRone 10 MG tablet  Commonly known as: BUSPAR     BYETTA 5 mcg/dose (250 mcg/mL) 1.2 mL injection  Generic drug: exenatide     doxepin 3 mg Tab  Commonly known as: SILENOR     lancets Misc     VRAYLAR 1.5 mg Cap  Generic drug: cariprazine               Where to Get Your Medications        These medications were sent to [facility] [place]  [address]  [address] [place] [address]      Phone: [phone]   docusate sodium 100 MG capsule  linaCLOtide 145 mcg Cap capsule  mirtazapine 15 MG tablet  nicotine 21 mg/24 hr  venlafaxine 150 MG Cp24  venlafaxine 75 MG 24 hr capsule

## 2024-08-23 NOTE — NURSING
2100. AAO and appropriate. Displayed no c/o. Admits to Depression=2 and Anxiety=0. Denied any SI/HI/AH/VH. Accepted all PM meds as prescribed by Provider. In room resting quietly.

## 2024-08-23 NOTE — PROGRESS NOTES
2024 9:03 AM   Name: Magda Wahl   : 1960   MRN: [id number]   [facility]     SUBJECTIVE:   Pt seen and chart reviewed, received update from staff. Pt presents for interview casually dressed with fair hygiene and grooming. She exhibits fair range of affect and overall calm and cooperative. States her mood is much improved since arrival - less anxious/depressed, feels well rested, no racing thoughts, denies hopelessness or SI/plan/intent. Finds current medications effective and tolerating well, denies s/e. Sleep and appetite good. No psychosis evident. She feels safe and ready for discharge today, voices understanding of aftercare plan.        Current Medications:   Scheduled Meds:    atorvastatin  80 mg Oral Daily    clopidogreL  75 mg Oral Daily    docusate sodium  100 mg Oral BID    famotidine  20 mg Oral Daily    levothyroxine  88 mcg Oral Before breakfast    linaCLOtide  145 mcg Oral Before breakfast    mirtazapine  15 mg Oral QHS    montelukast  10 mg Oral QHS    nicotine  1 patch Transdermal Daily    pregabalin  150 mg Oral BID    venlafaxine  150 mg Oral Daily    venlafaxine  75 mg Oral Daily      PRN Meds:   Current Facility-Administered Medications:     acetaminophen, 650 mg, Oral, Q6H PRN    aluminum-magnesium hydroxide-simethicone, 30 mL, Oral, Q6H PRN    bisacodyL, 10 mg, Oral, Daily PRN    glucagon (human recombinant), 1 mg, Intramuscular, PRN    glucose, 16 g, Oral, PRN    glucose, 24 g, Oral, PRN    hydrOXYzine pamoate, 50 mg, Oral, Q6H PRN    ibuprofen, 400 mg, Oral, Q6H PRN    insulin aspart U-100, 0-10 Units, Subcutaneous, BID PRN    LORazepam, 1 mg, Oral, Q6H PRN     Allergies:   Review of patient's allergies indicates:   Allergen Reactions    Fluoxetine      Other reaction(s): Hearing voices    Codeine Hallucinations     Kimball voices      Olanzapine Hives        OBJECTIVE:   Vitals   Vitals:    24 0610   BP: 118/75   Pulse: 73   Resp: 20   Temp: 98.1 °F (36.7 °C)     "    Mental Status Exam:  Appearance: appropriate and fair hygiene/grooming  Sensorium: alert  Orientation: oriented to person, place, and time  Behavior/Cooperation: calm and cooperative  Movements/Motor Activity: No tremor or involuntary movements observed. No psychomotor retardation or agitation  Speech: normal tone, normal rate, normal volume  Affect: full range  Mood:  fair, improved  Thought Process: linear and organized  Associations: no loosening of associations  Thought Content: denies SI/HI/plan/intent and no paranoia or delusions volunteered  Thought Perceptions: denies AVH and no RIS observed during assessment  Attention/Concentration: Grossly intact  Memory: recent and remote grossly intact  Insight: fair  Judgment: fair    Recent Results (from the past 48 hour(s))   POCT glucose    Collection Time: 08/21/24  4:17 PM   Result Value Ref Range    POCT Glucose 172 (H) 70 - 110 mg/dL   POCT glucose    Collection Time: 08/22/24  6:21 AM   Result Value Ref Range    POCT Glucose 128 (H) 70 - 110 mg/dL   POCT glucose    Collection Time: 08/22/24  4:45 PM   Result Value Ref Range    POCT Glucose 132 (H) 70 - 110 mg/dL   POCT glucose    Collection Time: 08/23/24  5:35 AM   Result Value Ref Range    POCT Glucose 131 (H) 70 - 110 mg/dL      No results found for: "PHENYTOIN", "PHENOBARB", "VALPROATE", "CBMZ"      ASSESSMENT/PLAN:   Diagnosis:  Active Hospital Problems    Diagnosis  POA    *Severe episode of recurrent major depressive disorder, without psychotic features [F33.2]  Yes       Plan:  - Discharge today 8/23, aftercare with outpt psych NP  - Cont current meds - effective, well tolerated, 30d Rx sent to pharmacy    Expected Disposition Plan: Home      Juliano Stanley MD  Psychiatry - [facility]  08/23/2024 9:07 AM    "

## 2024-08-23 NOTE — NURSING
"Daily Nursing Note:      Behavior:    Patient (Magda Wahl is a 64 y.o. female, : 1960, MRN: [id number]) demonstrating an affect that was ....constricted.   Magda demonstrating mood that is depressed and anxious. Magda had an appearance that was clean. Magda denies suicidal ideation. Magda denies suicide plan. Magda denies homicidal ideation. Magda denies hallucinations.      Magda's  height is 5' 3" (1.6 m) and weight is 62 kg (136 lb 11 oz). Her oral temperature is 98.2 °F (36.8 °C). Her blood pressure is 135/67 and her pulse is 82. Her respiration is 16 and oxygen saturation is 99%.     Intervention:    Encourage Magda to perform self-hygiene, grooming, and changing of clothing. Monitor Magda's behavior and program compliance. Monitor Magda for suicidal ideation, homicidal ideation, sleep disturbance, and hallucinations. Encourage Magda to eat all portions of meals and assess for meal preferences. Monitor Magda for intake and output to ensure hydration. Notify the Physician/Physician Assistant/Advance Practice Registered Nurse (MD/PA/APRN) for any medication refusal and any change in patient condition.      Response:    Magda verbalizes understand of unit process and procedures. Magda is eating and sleeping well, BM X1 today, compliant with medication, sitting in dining area, interacts well with peers and staff, discharging today, will continue to monitor.      Plan:     Continue to monitor per MD/PA/APRN orders; maintain patient safety.  "

## 2024-08-25 NOTE — DISCHARGE SUMMARY
[facility]  Psychiatry  Discharge Summary      Patient Name: Magda Wahl  MRN: [id number]  Admission Date: 2024  Hospital Length of Stay: 9 days  Discharge Date and Time: 2024  8:50 AM  Attending Physician: No att. providers found   Discharging Provider: Luis Velazquez MD  Primary Care Provider: Omar Barboza APRN    HPI:   63 yo female who presents post attempts to self injure.  Patient was this time he was events after she was gestures to self injure overdoses Effexor XR.  Patient was deemed so with the intention to end her life.      Patient was this time describes recent life stressor characterized by the followin.  Severe financial stress associated with being on disability in her  return   2. Marked instability overall mood  3.  Low self-worth self-esteem   4.  Preoccupation with past events.  5. Lack of interest in pleasurable activities   6.  Poor concentration   7. Ever present anxiety  8. Initial terminal insomnia      Patient was this time reports she was struggled most recently by struggling with finances, struggling with the raising adult disabled son, in conflict with sister over patient was 92-year-old mother.    Patient was has struggled at this time with the mood and continues to do so.      Patient was this time does has a history of past use of alcohol.    Patient was denies use of cannabis other substances.      Patient was this time when questioned about issues of trauma earlier in life denies.  She currently lives in [facility].  She was describes having no previous attempts to self injure.    Psychiatric history shows that patient was been seen on outpatient basis per provider.  Patient was states she was seen by mental health prescriber roughly every 3-6 months.  She was states she does have a therapist.    Medical surgical history he was benign     Medications: Patient was this time does not know the names of her  medications.      Family history significant for significant depression on maternal side family.  Paternal grandfather reportedly shot himself.  He was describes no issues of schizophrenia in the family session.      Patient was does has a high school level education.  He was a father of 3 adults has 7 grandchildren.  Patient was highest grade education was 10th grade.  She was states she was less goal because she got pregnant.    Patient was denies any prior history of legal difficulties.    Hospital Course:   Discharge:  08/23/2024.      64-year-old white female who at this time presents this facility involuntarily after apparently having ongoing statements of intention to self-injurious gestures to do so.  Patient was has a result was admitted to the inpatient setting here at [facility] for stabilization.    Patient was recent history of which she has a apparently been engaging in high-risk behaviors such as gambling.  She was apparently spent 600 dollars and their income.  Apparently she was has been both on fixed income.  She was on disability and he was retired.  Has a result finances he was already stressful and patient was made things more complicated by bingeing with gambling.    Patient was has a result become increasingly despondent mood statements desires to self-harm.      Patient was participate in overall evaluation with staff in terms.  Patient was identifies having any history of hypertension, and, hypothyroidism, patient was seen by [facility] hospitalist who managed patient was throughout the course of the hospitalization.  Patient was only medical complications during her stay she developed mild constipation.      Patient was went full psychiatric assessment this time.  On psychiatric evaluation patient was noted at this time to have persistent ongoing difficulties in terms mood, from anxious overwhelmed become increasingly despondent as well as nonadherence to medications.  Patient was  evaluated at this time such recommendations mood rechallenge patient was trials of Effexor XR for targeting symptoms of persistent mood instability and anxiety.  Patient was overall doses of Effexor XR were titrated upwards to 225 mg p.o. q.day. patient was tolerated well without complications.  Attempts to manage overall mood disturbance she was started on trials of low-dose Remeron at 15 mg p.o. q.h.s. and patient was surprisingly deemed nicely with the individuals.  He was shows evidence of which she has slow improvement overall mood appeared to be somewhat more hopeful.      Patient was during the course of hospitalization with use of the Remeron she did show improvement overall sleep cycle.      Patient was comply with the individuals.   did work with the patient was individually as well as family individuals.  We will address issues of maladaptive relationship, her again when behavior in the impaired interpersonal functioning both he was  had.    Patient was no complications with medication management.  She made evidence of slow and steady progression during her stay.  She was able to acknowledge her need to comply with medications well as follow up outpatient basis.  She was encouraged to follow up with IOP level of care but ultimately made decisions not to follow through with the intensity.  Strongly recommended individual therapy them as long as she has access to medical management.  Patient was had no complications was discharged to home 08/23/2024 patient was time of discharge was not suicidal.  Patient was showed no immediate statements desires to self-harm.     Goals of Care Treatment Preferences:  Code Status: Full Code      * No surgery found *     Consults:   Physical Exam    64-year-old white female whose affect shows just a glimmer of improvement.  Whose speech is with good articulation and execution.  His thought processes this time were able to be tracked.  His thought  content demonstrated no clear evidence of any auditory or visual hallucinations this time.  Patient was did not acknowledge any mood statements desires to self injure.  She adamantly stated at this time.  She made no statements to harm others.  She was not show delusions or distortions perceptions.  Orientation was intact.     Significant Labs: Last 72 Hours:   Recent Lab Results         08/23/24  0535   08/22/24  1645        POCT Glucose 131   132               Significant Imaging: None    Smoking Cessation:   Does the patient smoke? Yes  Does the patient want a prescription for Smoking Cessation? No  Does the patient want counseling for Smoking Cessation? No         Pending Diagnostic Studies:       None          Final Active Diagnoses:    Diagnosis Date Noted POA    PRINCIPAL PROBLEM:  Severe episode of recurrent major depressive disorder, without psychotic features [F33.2] 08/15/2024 Yes    Suicide ideation [R45.851] 08/15/2024 Not Applicable    Tobacco user [Z72.0] 07/03/2023 Yes    Acquired hypothyroidism [E03.9] 07/03/2023 Yes    Other hyperlipidemia [E78.49] 07/03/2023 Yes    Type 2 diabetes mellitus with hyperglycemia, without long-term current use of insulin [E11.65] 07/03/2023 Yes    Chronic venous insufficiency [I87.2] 08/20/2018 Yes      Problems Resolved During this Admission:    Diagnosis Date Noted Date Resolved POA    Depression with suicidal ideation [F32.A, R45.851] 08/15/2024 08/15/2024 Not Applicable      No new Assessment & Plan notes have been filed under this hospital service since the last note was generated.  Service: Psychiatry      Functional Condition: Independent ambulation and Can read/write    Discharged Condition: fair    Disposition: Home or Self Care    Follow Up:   Follow-up Information       [facility], [facility] Follow up on 9/5/2024.    Specialty: Psychiatry  Why: Appointment is at 1:30pm  Contact information:  [place]TE[place]  [id number]  [phone]                           Patient Instructions:   No discharge procedures on file.  Medications:  Reconciled Home Medications:      Medication List        START taking these medications      docusate sodium 100 MG capsule  Commonly known as: COLACE  Take 1 capsule (100 mg total) by mouth 2 (two) times daily.     linaCLOtide 145 mcg Cap capsule  Commonly known as: LINZESS  Take 1 capsule (145 mcg total) by mouth before breakfast.     mirtazapine 15 MG tablet  Commonly known as: REMERON  Take 1 tablet (15 mg total) by mouth every evening.     nicotine 21 mg/24 hr  Commonly known as: NICODERM CQ  Place 1 patch onto the skin once daily. for 21 days            CHANGE how you take these medications      * venlafaxine 150 MG Cp24  Commonly known as: EFFEXOR-XR  Take 1 capsule (150 mg total) by mouth once daily.  What changed:   medication strength  how much to take     * venlafaxine 75 MG 24 hr capsule  Commonly known as: EFFEXOR-XR  Take 1 capsule (75 mg total) by mouth once daily.  What changed: You were already taking a medication with the same name, and this prescription was added. Make sure you understand how and when to take each.           * This list has 2 medication(s) that are the same as other medications prescribed for you. Read the directions carefully, and ask your doctor or other care provider to review them with you.                CONTINUE taking these medications      clopidogreL 75 mg tablet  Commonly known as: PLAVIX  Take 1 tablet (75 mg total) by mouth once daily.     famotidine 20 MG tablet  Commonly known as: PEPCID  Take 20 mg by mouth Daily.     levothyroxine 88 MCG tablet  Commonly known as: SYNTHROID  Take 1 tablet (88 mcg total) by mouth every morning.     montelukast 10 mg tablet  Commonly known as: SINGULAIR  Take 10 mg by mouth every evening.     pregabalin 150 MG capsule  Commonly known as: LYRICA  Take 150 mg by mouth 2 (two) times daily.     rosuvastatin 40 MG  Tab  Commonly known as: CRESTOR  Take 1 tablet (40 mg total) by mouth Daily.            STOP taking these medications      ARIPiprazole 2 MG Tab  Commonly known as: ABILIFY     azelastine 137 mcg (0.1 %) nasal spray  Commonly known as: ASTELIN     blood sugar diagnostic Strp     blood-glucose meter kit     busPIRone 10 MG tablet  Commonly known as: BUSPAR     BYETTA 5 mcg/dose (250 mcg/mL) 1.2 mL injection  Generic drug: exenatide     doxepin 3 mg Tab  Commonly known as: SILENOR     lancets Misc     VRAYLAR 1.5 mg Cap  Generic drug: cariprazine            Is patient being discharged on multiple antipsychotics? No        Total time:30 with greater than 50% of this time spent in counseling and/or coordination of care.     All elements of the transition record were discussed with the patient at discharge and patient agrees to this plan.    Luis Velazquez MD  Psychiatry  [facility]

## 2024-08-25 NOTE — HOSPITAL COURSE
Discharge:  08/23/2024.      64-year-old white female who at this time presents this facility involuntarily after apparently having ongoing statements of intention to self-injurious gestures to do so.  Patient was has a result was admitted to the inpatient setting here at [facility] for stabilization.    Patient was recent history of which she has a apparently been engaging in high-risk behaviors such as gambling.  She was apparently spent 600 dollars and their income.  Apparently she was has been both on fixed income.  She was on disability and he was retired.  Has a result finances he was already stressful and patient was made things more complicated by bingeing with gambling.    Patient was has a result become increasingly despondent mood statements desires to self-harm.      Patient was participate in overall evaluation with staff in terms.  Patient was identifies having any history of hypertension, and, hypothyroidism, [facility] was throughout the course of the hospitalization.  Patient was only medical complications during her stay she developed mild constipation.      Patient was went full psychiatric assessment this time.  On psychiatric evaluation patient was noted at this time to have persistent ongoing difficulties in terms mood, from anxious overwhelmed become increasingly despondent as well as nonadherence to medications.  Patient was evaluated at this time such recommendations mood rechallenge patient was trials of Effexor XR for targeting symptoms of persistent mood instability and anxiety.  Patient was overall doses of Effexor XR were titrated upwards to 225 mg p.o. q.day. patient was tolerated well without complications.  Attempts to manage overall mood disturbance she was started on trials of low-dose Remeron at 15 mg p.o. q.h.s. and patient was surprisingly deemed nicely with the individuals.  He was shows evidence of which she has slow  improvement overall mood appeared to be somewhat more hopeful.      Patient was during the course of hospitalization with use of the Remeron she did show improvement overall sleep cycle.      Patient was comply with the individuals.   did work with the patient was individually as well as family individuals.  We will address issues of maladaptive relationship, her again when behavior in the impaired interpersonal functioning both he was  had.    Patient was no complications with medication management.  She made evidence of slow and steady progression during her stay.  She was able to acknowledge her need to comply with medications well as follow up outpatient basis.  She was encouraged to follow up with IOP level of care but ultimately made decisions not to follow through with the intensity.  Strongly recommended individual therapy them as long as she has access to medical management.  Patient was had no complications was discharged to home 08/23/2024 patient was time of discharge was not suicidal.  Patient was showed no immediate statements desires to self-harm.

## 2024-09-05 ENCOUNTER — PATIENT MESSAGE (OUTPATIENT)
Dept: FAMILY MEDICINE | Facility: CLINIC | Age: 64
End: 2024-09-05

## 2024-09-05 ENCOUNTER — OFFICE VISIT (OUTPATIENT)
Dept: BEHAVIORAL HEALTH | Facility: CLINIC | Age: 64
End: 2024-09-05
Payer: MEDICARE

## 2024-09-05 VITALS
HEIGHT: 63 IN | OXYGEN SATURATION: 98 % | WEIGHT: 129.88 LBS | BODY MASS INDEX: 23.01 KG/M2 | TEMPERATURE: 98 F | HEART RATE: 65 BPM | SYSTOLIC BLOOD PRESSURE: 122 MMHG | DIASTOLIC BLOOD PRESSURE: 68 MMHG

## 2024-09-05 DIAGNOSIS — Z72.0 TOBACCO USER: ICD-10-CM

## 2024-09-05 DIAGNOSIS — F41.1 GENERALIZED ANXIETY DISORDER: ICD-10-CM

## 2024-09-05 DIAGNOSIS — Z91.51 HISTORY OF SUICIDE ATTEMPT: ICD-10-CM

## 2024-09-05 DIAGNOSIS — Z79.899 OTHER LONG TERM (CURRENT) DRUG THERAPY: ICD-10-CM

## 2024-09-05 DIAGNOSIS — F33.2 SEVERE EPISODE OF RECURRENT MAJOR DEPRESSIVE DISORDER, WITHOUT PSYCHOTIC FEATURES: Primary | ICD-10-CM

## 2024-09-05 DIAGNOSIS — Z86.59 HISTORY OF PSYCHIATRIC HOSPITALIZATION: ICD-10-CM

## 2024-09-05 LAB
AMP AMPHETAMINE 1000 NM/ML POC: NEGATIVE
BAR BARBITURATES 300 NG/ML POC: NEGATIVE
BUP BUPRENORPHINE 10 NG/ML POC: NEGATIVE
BZO BENZODIAZEPINES 300 NG/ML POC: NEGATIVE
COC COCAINE 300 NG/ML POC: NEGATIVE
CREATININE (CR) POC: NORMAL
CTP QC/QA: YES
MET METHAMPHETAMINE 1000 NG/ML POC: NEGATIVE
MOP/OPI300 MORPHINE 300 NG/ML POC: NEGATIVE
MTD METHADONE 300 NG/ML POC: NEGATIVE
OXIDANT (OX) POC: NORMAL
OXY OXYCODONE 100 NG/ML POC: NEGATIVE
SPECIFIC GRAVITY (SG) POC: NORMAL
TEMPERATURE (°F) POC: 96
THC MARIJUANA 50 NG/ML POC: NEGATIVE

## 2024-09-05 PROCEDURE — 99204 OFFICE O/P NEW MOD 45 MIN: CPT | Mod: ,,, | Performed by: NURSE PRACTITIONER

## 2024-09-05 PROCEDURE — 3008F BODY MASS INDEX DOCD: CPT | Mod: ,,, | Performed by: NURSE PRACTITIONER

## 2024-09-05 PROCEDURE — 3044F HG A1C LEVEL LT 7.0%: CPT | Mod: ,,, | Performed by: NURSE PRACTITIONER

## 2024-09-05 PROCEDURE — 3061F NEG MICROALBUMINURIA REV: CPT | Mod: ,,, | Performed by: NURSE PRACTITIONER

## 2024-09-05 PROCEDURE — 1159F MED LIST DOCD IN RCRD: CPT | Mod: ,,, | Performed by: NURSE PRACTITIONER

## 2024-09-05 PROCEDURE — 1111F DSCHRG MED/CURRENT MED MERGE: CPT | Mod: ,,, | Performed by: NURSE PRACTITIONER

## 2024-09-05 PROCEDURE — 1160F RVW MEDS BY RX/DR IN RCRD: CPT | Mod: ,,, | Performed by: NURSE PRACTITIONER

## 2024-09-05 PROCEDURE — 3078F DIAST BP <80 MM HG: CPT | Mod: ,,, | Performed by: NURSE PRACTITIONER

## 2024-09-05 PROCEDURE — 80305 DRUG TEST PRSMV DIR OPT OBS: CPT | Mod: QW,RHCUB | Performed by: NURSE PRACTITIONER

## 2024-09-05 PROCEDURE — 3074F SYST BP LT 130 MM HG: CPT | Mod: ,,, | Performed by: NURSE PRACTITIONER

## 2024-09-05 PROCEDURE — 3066F NEPHROPATHY DOC TX: CPT | Mod: ,,, | Performed by: NURSE PRACTITIONER

## 2024-09-05 PROCEDURE — 1126F AMNT PAIN NOTED NONE PRSNT: CPT | Mod: ,,, | Performed by: NURSE PRACTITIONER

## 2024-09-05 RX ORDER — MIRTAZAPINE 15 MG/1
15 TABLET, FILM COATED ORAL NIGHTLY
Qty: 30 TABLET | Refills: 1 | Status: SHIPPED | OUTPATIENT
Start: 2024-09-05 | End: 2024-11-04

## 2024-09-05 RX ORDER — ACETAMINOPHEN 500 MG
4000 TABLET ORAL DAILY
COMMUNITY

## 2024-09-05 RX ORDER — VENLAFAXINE HYDROCHLORIDE 150 MG/1
150 CAPSULE, EXTENDED RELEASE ORAL DAILY
Qty: 30 CAPSULE | Refills: 1 | Status: SHIPPED | OUTPATIENT
Start: 2024-09-05 | End: 2024-11-04

## 2024-09-05 RX ORDER — MULTIVITAMIN
1 TABLET ORAL DAILY
COMMUNITY

## 2024-09-05 NOTE — PROGRESS NOTES
PSYCHIATRIC INITIAL VISIT NOTE    Chief Complaint   Patient presents with    [facility]         History of Present Illness  64 y.o. year old White female with hx of depression, anxiety, prior psychiatric hospitalization, and history of suicide attempt seen today for initial psychiatric evaluation and medication management.  Patient was admitted to [facility] in early August 2024 following an overdose.  Records reviewed prior to today's visit.  Patient reports some improvement since returning home but states she has been out of 1 of her bottles of Effexor and of her mirtazapine.  States they were not available at the pharmacy following her discharge.  She was noticed some depressed mood, anhedonia, decreased sleep, fatigue, appetite changes, poor focus concentration, restlessness, excessive worry, general worry, feeling overwhelmed, difficulty relaxing, catastrophizing.  States that these symptoms were all more well-controlled prior to her discharge from the hospital, but some have increased due to her not having her medicine.  She also reports discord with her , who maybe suffering from early onset of Alzheimer's.  Also helps care for her disabled adult son.  She also has not had contact with her mother for 4 months due to family discord between patient and her sister.  In addition to hospitalization earlier this month, she was admitted approximately 1 year ago for similar issues.  No history of nonsuicidal self-harm behaviors.  Denies any SI or HI at this time.  Denies any history of psychosis/miles/hypomania. Patient denies SI/HI. Denies hallucinations and does not appear to be responding to internal stimuli or be internally preoccupied. No manic symptoms noted.     Patient was raised by her biological parents in the home along with her 10 siblings.  Reports having an okay childhood.  Denies any childhood emotional, physical, or sexual abuse.  Eleventh grade education.   Currently disabled due to chronic pain issues.  She has been  for 47 use.  Has 3 children.  Feels safe at home and that she has a good support system at this time.  No history of any seizure activity or head injuries.  Does identify as spiritual.  No history of any legal issues or  service.  She smokes half a pack of cigarettes per day.  Does not drink alcohol or abuse illicit drugs.    Family psychiatric history includes a sister with depression and a paternal grandfather who committed suicide.  Past medications include Zoloft which caused auditory hallucinations, Zyprexa which she was allergic to, Cymbalta which she was not recall her response to, Vraylar which was ineffective, doxepin was ineffective, BuSpar was ineffective, mixed response to Abilify.  Currently taking mirtazapine and Effexor.        Medical History    Past Medical History    Diagnosis Date Comments   Mixed hyperlipidemia [E78.2] 07/03/2023 Last prescribed rosuvastatin 40 per Dr. Medrano, gaps in fill hx,   Obstructive sleep apnea syndrome [G47.33] 07/03/2023 Improved with CPAP   Degenerative disc disease, lumbar [M51.36] 07/03/2023 Fall in 2010 at work, worked up by Dr. Soto and workman's comp.  Deemed disabled.  Completed injections and PT in the past. Did not tolerate gabapentin, now takes lyrica only once daily PRN, filled per Dr. Mendoza.   Type 2 diabetes mellitus [E11.9] 07/03/2023 Established with Dr. Franklin for vision.  Last prescribed metformin 1000 daily. Stopped d/t side effects. Begin ozempic   Vitamin D deficiency [E55.9] 07/03/2023    Hypothyroidism [E03.9] 07/03/2023 Gaps in fill hx but no dose adjustment in over a year. Continue 88mcg.   Mild chronic obstructive pulmonary disease [J44.9] 07/03/2023 2018 pft showed mild restriction. Patient did not do PFT in 2022 as ordered.   Mitral valve insufficiency [I34.0] 07/03/2023 Taking plavix   Depressive disorder [F32.A] 07/03/2023 Last filled cymbalta sept 2022   Bipolar  disorder [F31.9] 07/03/2023 Allergy to zyprexa, abilify ineffective. Last took vraylar 1.5- has not filled in 9 months, declines medication   Tobacco user [Z72.0] 07/03/2023 Down to 1/2 ppd. Declines medication   Chronic venous insufficiency [I87.2] 08/20/2018    Sleep difficulties [G47.9]     History of psychiatric hospitalization [Z86.59]     Hx of psychiatric care [Z92.89]     Psychiatric problem [F99]       Surgical History    Past Surgical History     Laterality Date Comments   Colonoscopy [YBR769]   Debbie Mccann MD   Eye surgery [DPX417]      Tubal ligation [SHX77]      Cholecystectomy [SHX55]         Family History     Relation Status Problems (Age of Onset) - (Comment)        Mother  Arthritis; Cancer; Diabetes   Father  Cancer; Diabetes; Hearing loss   Sister  Depression     Social History    Tobacco History    Smoking Status  Every Day Smoking Start Date  1975 Current Packs/Day  0.5 packs/day Average Packs/Day  0.5 packs/day for 49.7 years (24.8 ttl pk-yrs) Smoking Tobacco Type  Cigarettes since 1975   Pack Year History  Packs/Day From To Years   0.5 1975  49.7   Passive Exposure  Current   Smokeless Tobacco Use  Never   Alcohol History    Alcohol Use Status  Not Currently Drinks/Week  0 Glasses of wine, 0 Cans of beer, 0 Shots of liquor, 0 Drinks containing 0.5 oz of alcohol per week   Drug Use    Drug Use Status  Never   Sexual Activity    Sexually Active  Never        Additional Pertinent History    Questions Responses   Lives with family   Lives in home   Raised by biological parents   Legal Involvement none   Childhood Trauma early trauma   Criminal History of none   Financial Status disabled   Highest Level of Education unfinished highschool   Does patient have access to a firearm? No    Service No     Current Gender Identity    Questions Responses   Patient's Gender Identity: Female   Patient's sex assigned at birth: Female         Objective:     Vitals:  Vitals:    09/05/24 1314   BP:  "122/68   BP Location: Right arm   Patient Position: Sitting   BP Method: Large (Manual)   Pulse: 65   Temp: 98.1 °F (36.7 °C)   TempSrc: Temporal   SpO2: 98%   Weight: 58.9 kg (129 lb 13.6 oz)   Height: 5' 2.99" (1.6 m)       Wt Readings from Last 3 Encounters:   09/05/24 1314 58.9 kg (129 lb 13.6 oz)   08/13/24 2031 63.5 kg (140 lb)   08/06/24 1011 61.2 kg (135 lb)         Medication:    Current Outpatient Medications:     cholecalciferol, vitamin D3, (VITAMIN D3) 50 mcg (2,000 unit) Cap capsule, Take 4,000 Units by mouth once daily., Disp: , Rfl:     clopidogreL (PLAVIX) 75 mg tablet, Take 1 tablet (75 mg total) by mouth once daily., Disp: 90 tablet, Rfl: 3    famotidine (PEPCID) 20 MG tablet, Take 20 mg by mouth Daily., Disp: , Rfl:     levothyroxine (SYNTHROID) 88 MCG tablet, Take 1 tablet (88 mcg total) by mouth every morning., Disp: 90 tablet, Rfl: 3    montelukast (SINGULAIR) 10 mg tablet, Take 10 mg by mouth every evening., Disp: , Rfl:     multivitamin (ONE DAILY MULTIVITAMIN) per tablet, Take 1 tablet by mouth once daily., Disp: , Rfl:     pregabalin (LYRICA) 150 MG capsule, Take 150 mg by mouth 2 (two) times daily., Disp: , Rfl:     rosuvastatin (CRESTOR) 40 MG Tab, Take 1 tablet (40 mg total) by mouth Daily., Disp: 90 tablet, Rfl: 3    docusate sodium (COLACE) 100 MG capsule, Take 1 capsule (100 mg total) by mouth 2 (two) times daily. (Patient not taking: Reported on 9/5/2024), Disp: 60 capsule, Rfl: 0    mirtazapine (REMERON) 15 MG tablet, Take 1 tablet (15 mg total) by mouth every evening., Disp: 30 tablet, Rfl: 1    nicotine (NICODERM CQ) 21 mg/24 hr, Place 1 patch onto the skin once daily. for 21 days (Patient not taking: Reported on 9/5/2024), Disp: 21 patch, Rfl: 0    venlafaxine (EFFEXOR-XR) 150 MG Cp24, Take 1 capsule (150 mg total) by mouth once daily., Disp: 30 capsule, Rfl: 1       Significant Labs: - UDS collected in office today negative for illicit substances    Significant Imaging: - none " "at this time    Physical Exam  Vitals and nursing note reviewed.   Constitutional:       General: She is awake.      Appearance: Normal appearance.   Musculoskeletal:      Comments: Full ROM   Neurological:      Mental Status: She is alert.   Psychiatric:         Attention and Perception: Attention and perception normal. She does not perceive auditory or visual hallucinations.         Mood and Affect: Mood is anxious and depressed. Affect is tearful.         Speech: Speech normal.         Behavior: Behavior is withdrawn. Behavior is cooperative.         Thought Content: Thought content does not include homicidal or suicidal ideation.         Cognition and Memory: Cognition and memory normal.         Judgment: Judgment normal.          Review of Systems     Mental Status Exam:  Presentation:  - Appearance: 64 y.o. year old White female, appears stated age, appears Casually dressed and Well groomed  - Motility: Erect when standing, Steady gait, No EPS or Tremors, No psychomotor agitation or retardation appreciated  - Behavior: anxious, cooperative, doesn't maintain eye contact  Speech:  - Character/Organization: spontaneous, fluent, normal volume, normal rate, normal rhythm  Emotional State:  - Mood: "anxious, depressed"   - Affect: congruent and depressed  Thought:  - Process: logical, linear, organized , goal-directed  - Preoccupations: guilt  - Delusions: no persecutory, paranoid, or grandiose delusions appreciated  - Perception: denies AVH, not actively responding to internal stimuli  - SI/HI: denies/denies  Sensorium & Intellect:  - Sensorium: AAOx4  - Memory: intact to recent and remote events  - Attention/Concentration: good/good  - Insight/Judgement: good/good    Gait: normal swing and stance  MSK:no rigidity appreciated    All other systems without acute issues unless noted in HPI      Assessment/Plan      ICD-10-CM ICD-9-CM    1. Severe episode of recurrent major depressive disorder, without psychotic " features  F33.2 296.33 POCT Urine Drug Screen (With BUP)      mirtazapine (REMERON) 15 MG tablet      venlafaxine (EFFEXOR-XR) 150 MG Cp24      2. Generalized anxiety disorder  F41.1 300.02 POCT Urine Drug Screen (With BUP)      3. Tobacco user  Z72.0 305.1       4. History of psychiatric hospitalization  Z86.59 V11.9 POCT Urine Drug Screen (With BUP)      5. History of suicide attempt  Z91.51 V11.8 POCT Urine Drug Screen (With BUP)      6. Other long term (current) drug therapy  Z79.899 V58.69 POCT Urine Drug Screen (With BUP)         Increase Effexor to 150 mg every morning.  We will plan to increase to 225 mg daily at next visit    Resume mirtazapine 15 mg daily at bedtime    Continue other medications without change    UDS collected in office negative for controlled/illicit substances    Potential side effects and risks vs benefits of current treatment plan reviewed with patient. Applicable black box warnings reviewed. Encouraged patient not to alter dosages or abruptly discontinue medications without contacting prescriber first, due to risk of worsening symptoms and decompensation of mental status. Warned of risks associated with herbal remedies and supplements while taking psychotropic medications and of the need to consult prescriber prior to adding any of these to current regimen. Patient should abstain from abuse of alcohol, prescription medications, and illicit drugs. Reviewed when to contact clinic and/or seek emergent care, such as but not limited to, onset/worsening SI/HI, hallucinations, delusions, manic symptoms. Pt verbalized understanding and agreement of these warnings/recommendations and verbally consented to treatment plan.    Reviewed non-pharmacologic coping skills to decrease anxiety, such as deep breathing, journaling, exercise, recognizing triggers, meditation, healthy diet and exercise, routine sleep schedule, negative thought recognition, time for hobbies/interests, relaxation techniques,  avoidance of substance abuse, limiting caffeine, benefits of counseling, and biofeedback. Patient verbalized understanding.    Sleep hygiene should include: Regular sleep schedule, optimal sleep environment (dark room, lights out, no TV or Radio), relaxing pre-sleep ritual, avoid day time naps, no caffeine after noon, no excessive alcohol intake, no tobacco before bed time, and regular daily exercise.    Reviewed and confirmed patient's safety plan. Pt adamantly denies that they are a threat to self or others at this time, and current s/s support this. Pt verbally contracted for safety at conclusion of today's visit and verbalized when to seek emergent care. Pt also identified primary support person they can contact if symptoms begin to worsen, and they will contact clinic ASAP if this occurs.     checked. Results as expected. No suspected diversion or abuse of controlled substances.      Follow up in about 3 weeks (around 9/26/2024) for Medication Management.    Hi Jarvis, TANYAP

## 2024-09-30 ENCOUNTER — OFFICE VISIT (OUTPATIENT)
Dept: BEHAVIORAL HEALTH | Facility: CLINIC | Age: 64
End: 2024-09-30
Payer: MEDICARE

## 2024-09-30 VITALS
DIASTOLIC BLOOD PRESSURE: 68 MMHG | BODY MASS INDEX: 24.65 KG/M2 | OXYGEN SATURATION: 97 % | HEART RATE: 88 BPM | WEIGHT: 139.13 LBS | SYSTOLIC BLOOD PRESSURE: 126 MMHG | HEIGHT: 63 IN | TEMPERATURE: 99 F

## 2024-09-30 DIAGNOSIS — Z72.0 TOBACCO USER: ICD-10-CM

## 2024-09-30 DIAGNOSIS — F33.2 SEVERE EPISODE OF RECURRENT MAJOR DEPRESSIVE DISORDER, WITHOUT PSYCHOTIC FEATURES: Primary | ICD-10-CM

## 2024-09-30 DIAGNOSIS — F43.21 GRIEF: ICD-10-CM

## 2024-09-30 DIAGNOSIS — F41.1 GENERALIZED ANXIETY DISORDER: ICD-10-CM

## 2024-09-30 PROCEDURE — 3074F SYST BP LT 130 MM HG: CPT | Mod: ,,, | Performed by: NURSE PRACTITIONER

## 2024-09-30 PROCEDURE — 3066F NEPHROPATHY DOC TX: CPT | Mod: ,,, | Performed by: NURSE PRACTITIONER

## 2024-09-30 PROCEDURE — 3078F DIAST BP <80 MM HG: CPT | Mod: ,,, | Performed by: NURSE PRACTITIONER

## 2024-09-30 PROCEDURE — 3008F BODY MASS INDEX DOCD: CPT | Mod: ,,, | Performed by: NURSE PRACTITIONER

## 2024-09-30 PROCEDURE — 1126F AMNT PAIN NOTED NONE PRSNT: CPT | Mod: ,,, | Performed by: NURSE PRACTITIONER

## 2024-09-30 PROCEDURE — 3061F NEG MICROALBUMINURIA REV: CPT | Mod: ,,, | Performed by: NURSE PRACTITIONER

## 2024-09-30 PROCEDURE — 1160F RVW MEDS BY RX/DR IN RCRD: CPT | Mod: ,,, | Performed by: NURSE PRACTITIONER

## 2024-09-30 PROCEDURE — 1159F MED LIST DOCD IN RCRD: CPT | Mod: ,,, | Performed by: NURSE PRACTITIONER

## 2024-09-30 PROCEDURE — 3044F HG A1C LEVEL LT 7.0%: CPT | Mod: ,,, | Performed by: NURSE PRACTITIONER

## 2024-09-30 PROCEDURE — 99214 OFFICE O/P EST MOD 30 MIN: CPT | Mod: ,,, | Performed by: NURSE PRACTITIONER

## 2024-09-30 RX ORDER — VENLAFAXINE HYDROCHLORIDE 150 MG/1
150 CAPSULE, EXTENDED RELEASE ORAL EVERY MORNING
Qty: 30 CAPSULE | Refills: 1 | Status: SHIPPED | OUTPATIENT
Start: 2024-09-30 | End: 2024-11-29

## 2024-09-30 RX ORDER — VENLAFAXINE HYDROCHLORIDE 75 MG/1
75 CAPSULE, EXTENDED RELEASE ORAL DAILY
Qty: 30 CAPSULE | Refills: 1 | Status: SHIPPED | OUTPATIENT
Start: 2024-09-30

## 2024-09-30 RX ORDER — MIRTAZAPINE 15 MG/1
15 TABLET, FILM COATED ORAL NIGHTLY
Qty: 30 TABLET | Refills: 1 | Status: SHIPPED | OUTPATIENT
Start: 2024-09-30 | End: 2024-11-29

## 2024-09-30 NOTE — PROGRESS NOTES
"PSYCHIATRIC FOLLOW-UP VISIT NOTE    Chief Complaint   Patient presents with    Medication Management     3 week medication management         History of Present Illness  64 y.o. year old White female with hx of MDD, LESLEE, and tobacco use seen today for follow-up appointment and medication management.  Patient reports some improvement with increased dose of Effexor and addition of mirtazapine.  Her reaction to these meds is complicated by the death of her mother on September 19th.  Mother was 92 years old in patient has accepted this loss and feels she is coping well.  Allowing herself to grieve and discussing her loss with her siblings.  Feels the medications have made it easier to cope with this loss.  Her anxiety is somewhat improved.  Still quite sad with depressed mood multiple days per week.  She was also had some sleep disturbances since her mother passed.  She denies any side effects from current medication regimen.  We agreed to increase her dosage of Effexor to 225 mg today as planned.  We will continue mirtazapine unchanged at this time.  We will re-evaluate efficacy in 4 weeks. Patient denies SI/HI. Denies hallucinations and does not appear to be responding to internal stimuli or be internally preoccupied. No manic symptoms noted.       Objective:     Vitals:  Vitals:    09/30/24 1429   BP: 126/68   BP Location: Right arm   Patient Position: Sitting   Pulse: 88   Temp: 99.3 °F (37.4 °C)   TempSrc: Temporal   SpO2: 97%   Weight: 63.1 kg (139 lb 1.8 oz)   Height: 5' 2.99" (1.6 m)       Wt Readings from Last 3 Encounters:   09/30/24 1429 63.1 kg (139 lb 1.8 oz)   09/05/24 1314 58.9 kg (129 lb 13.6 oz)   08/13/24 2031 63.5 kg (140 lb)         Medication:    Current Outpatient Medications:     cholecalciferol, vitamin D3, (VITAMIN D3) 50 mcg (2,000 unit) Cap capsule, Take 4,000 Units by mouth once daily., Disp: , Rfl:     clopidogreL (PLAVIX) 75 mg tablet, Take 1 tablet (75 mg total) by mouth once daily., Disp: " 90 tablet, Rfl: 3    famotidine (PEPCID) 20 MG tablet, Take 20 mg by mouth Daily., Disp: , Rfl:     levothyroxine (SYNTHROID) 88 MCG tablet, Take 1 tablet (88 mcg total) by mouth every morning., Disp: 90 tablet, Rfl: 3    montelukast (SINGULAIR) 10 mg tablet, Take 10 mg by mouth every evening., Disp: , Rfl:     multivitamin (ONE DAILY MULTIVITAMIN) per tablet, Take 1 tablet by mouth once daily., Disp: , Rfl:     pregabalin (LYRICA) 150 MG capsule, Take 150 mg by mouth 2 (two) times daily., Disp: , Rfl:     rosuvastatin (CRESTOR) 40 MG Tab, Take 1 tablet (40 mg total) by mouth Daily., Disp: 90 tablet, Rfl: 3    mirtazapine (REMERON) 15 MG tablet, Take 1 tablet (15 mg total) by mouth every evening., Disp: 30 tablet, Rfl: 1    venlafaxine (EFFEXOR-XR) 150 MG Cp24, Take 1 capsule (150 mg total) by mouth every morning. Take with 75mg cap for total daily dose of 225mg., Disp: 30 capsule, Rfl: 1    venlafaxine (EFFEXOR-XR) 75 MG 24 hr capsule, Take 1 capsule (75 mg total) by mouth once daily. Take with 150mg cap for total daily dose of 225mg., Disp: 30 capsule, Rfl: 1       Significant Labs: - none at this time    Significant Imaging: - none at this time    Physical Exam  Vitals and nursing note reviewed.   Constitutional:       General: She is awake.      Appearance: Normal appearance.   Musculoskeletal:      Comments: Full ROM   Neurological:      Mental Status: She is alert.   Psychiatric:         Attention and Perception: Attention and perception normal. She does not perceive auditory or visual hallucinations.         Mood and Affect: Affect normal. Mood is anxious and depressed.         Speech: Speech normal.         Behavior: Behavior is cooperative.         Thought Content: Thought content does not include homicidal or suicidal ideation.         Cognition and Memory: Cognition and memory normal.         Judgment: Judgment normal.      Comments: Grieving/sad          Review of Systems     Mental Status  "Exam:  Presentation:  - Appearance: 64 y.o. year old White female, appears stated age, appears Casually dressed and Well groomed  - Motility: Erect when standing, Steady gait, No EPS or Tremors, No psychomotor agitation or retardation appreciated  - Behavior: calm, cooperative, good eye contact  Speech:  - Character/Organization: spontaneous, fluent, normal volume, normal rate, normal rhythm  Emotional State:  - Mood: "sad/depressed/anxious"   - Affect: congruent and sad  Thought:  - Process: logical, linear, organized , goal-directed  - Preoccupations: family  - Delusions: no persecutory, paranoid, or grandiose delusions appreciated  - Perception: denies AVH, not actively responding to internal stimuli  - SI/HI: denies/denies  Sensorium & Intellect:  - Sensorium: AAOx4  - Memory: intact to recent and remote events  - Attention/Concentration: good/good  - Insight/Judgement: good/good    Gait: normal swing and stance  MSK:no rigidity appreciated    All other systems without acute issues unless noted in HPI      Assessment/Plan      ICD-10-CM ICD-9-CM    1. Severe episode of recurrent major depressive disorder, without psychotic features  F33.2 296.33 venlafaxine (EFFEXOR-XR) 150 MG Cp24      mirtazapine (REMERON) 15 MG tablet      venlafaxine (EFFEXOR-XR) 75 MG 24 hr capsule      2. Generalized anxiety disorder  F41.1 300.02       3. Tobacco user  Z72.0 305.1       4. Grief  F43.21 309.0          Increase Effexor to 225 mg every morning as planned    Continue other medications without change    Potential side effects and risks vs benefits of current treatment plan reviewed with patient. Applicable black box warnings reviewed. Encouraged patient not to alter dosages or abruptly discontinue medications without contacting prescriber first, due to risk of worsening symptoms and decompensation of mental status. Warned of risks associated with herbal remedies and supplements while taking psychotropic medications and of the " need to consult prescriber prior to adding any of these to current regimen. Patient should abstain from abuse of alcohol, prescription medications, and illicit drugs. Reviewed when to contact clinic and/or seek emergent care, such as but not limited to, onset/worsening SI/HI, hallucinations, delusions, manic symptoms. Pt verbalized understanding and agreement of these warnings/recommendations and verbally consented to treatment plan.        Follow up in about 4 weeks (around 10/28/2024) for Medication Management.    Hi Jarvis, TANYAP

## 2024-10-14 ENCOUNTER — TELEPHONE (OUTPATIENT)
Dept: FAMILY MEDICINE | Facility: CLINIC | Age: 64
End: 2024-10-14
Payer: MEDICARE

## 2024-10-14 DIAGNOSIS — I34.0 MITRAL VALVE INSUFFICIENCY, UNSPECIFIED ETIOLOGY: ICD-10-CM

## 2024-10-14 RX ORDER — CLOPIDOGREL BISULFATE 75 MG/1
75 TABLET ORAL DAILY
Qty: 90 TABLET | Refills: 3 | Status: SHIPPED | OUTPATIENT
Start: 2024-10-14

## 2024-10-30 ENCOUNTER — OFFICE VISIT (OUTPATIENT)
Dept: BEHAVIORAL HEALTH | Facility: CLINIC | Age: 64
End: 2024-10-30
Payer: MEDICARE

## 2024-10-30 VITALS
OXYGEN SATURATION: 97 % | TEMPERATURE: 99 F | WEIGHT: 150.13 LBS | DIASTOLIC BLOOD PRESSURE: 68 MMHG | HEIGHT: 63 IN | BODY MASS INDEX: 26.6 KG/M2 | HEART RATE: 91 BPM | SYSTOLIC BLOOD PRESSURE: 120 MMHG

## 2024-10-30 DIAGNOSIS — Z72.0 TOBACCO USER: ICD-10-CM

## 2024-10-30 DIAGNOSIS — F43.21 GRIEF: ICD-10-CM

## 2024-10-30 DIAGNOSIS — F33.2 SEVERE EPISODE OF RECURRENT MAJOR DEPRESSIVE DISORDER, WITHOUT PSYCHOTIC FEATURES: Primary | ICD-10-CM

## 2024-10-30 DIAGNOSIS — F41.1 GENERALIZED ANXIETY DISORDER: ICD-10-CM

## 2024-10-30 PROCEDURE — 1160F RVW MEDS BY RX/DR IN RCRD: CPT | Mod: ,,, | Performed by: NURSE PRACTITIONER

## 2024-10-30 PROCEDURE — 3061F NEG MICROALBUMINURIA REV: CPT | Mod: ,,, | Performed by: NURSE PRACTITIONER

## 2024-10-30 PROCEDURE — 3066F NEPHROPATHY DOC TX: CPT | Mod: ,,, | Performed by: NURSE PRACTITIONER

## 2024-10-30 PROCEDURE — 3044F HG A1C LEVEL LT 7.0%: CPT | Mod: ,,, | Performed by: NURSE PRACTITIONER

## 2024-10-30 PROCEDURE — 3078F DIAST BP <80 MM HG: CPT | Mod: ,,, | Performed by: NURSE PRACTITIONER

## 2024-10-30 PROCEDURE — 1159F MED LIST DOCD IN RCRD: CPT | Mod: ,,, | Performed by: NURSE PRACTITIONER

## 2024-10-30 PROCEDURE — 3008F BODY MASS INDEX DOCD: CPT | Mod: ,,, | Performed by: NURSE PRACTITIONER

## 2024-10-30 PROCEDURE — 3074F SYST BP LT 130 MM HG: CPT | Mod: ,,, | Performed by: NURSE PRACTITIONER

## 2024-10-30 PROCEDURE — 1126F AMNT PAIN NOTED NONE PRSNT: CPT | Mod: ,,, | Performed by: NURSE PRACTITIONER

## 2024-10-30 PROCEDURE — 99214 OFFICE O/P EST MOD 30 MIN: CPT | Mod: ,,, | Performed by: NURSE PRACTITIONER

## 2024-10-30 RX ORDER — VENLAFAXINE HYDROCHLORIDE 150 MG/1
150 CAPSULE, EXTENDED RELEASE ORAL EVERY MORNING
Qty: 30 CAPSULE | Refills: 0 | Status: SHIPPED | OUTPATIENT
Start: 2024-10-30 | End: 2024-12-29

## 2024-10-30 RX ORDER — AMITRIPTYLINE HYDROCHLORIDE 10 MG/1
10 TABLET, FILM COATED ORAL NIGHTLY
Qty: 30 TABLET | Refills: 0 | Status: SHIPPED | OUTPATIENT
Start: 2024-10-30

## 2024-10-30 RX ORDER — VENLAFAXINE HYDROCHLORIDE 75 MG/1
75 CAPSULE, EXTENDED RELEASE ORAL DAILY
Qty: 30 CAPSULE | Refills: 0 | Status: SHIPPED | OUTPATIENT
Start: 2024-10-30

## 2024-11-19 ENCOUNTER — HOSPITAL ENCOUNTER (OUTPATIENT)
Dept: RADIOLOGY | Facility: HOSPITAL | Age: 64
Discharge: HOME | End: 2024-11-19
Attending: NURSE PRACTITIONER
Payer: MEDICARE

## 2024-11-19 ENCOUNTER — OFFICE VISIT (OUTPATIENT)
Dept: FAMILY MEDICINE | Facility: CLINIC | Age: 64
End: 2024-11-19
Payer: MEDICARE

## 2024-11-19 VITALS
OXYGEN SATURATION: 98 % | TEMPERATURE: 98 F | SYSTOLIC BLOOD PRESSURE: 116 MMHG | WEIGHT: 153 LBS | DIASTOLIC BLOOD PRESSURE: 82 MMHG | HEIGHT: 63 IN | HEART RATE: 77 BPM | BODY MASS INDEX: 27.11 KG/M2

## 2024-11-19 DIAGNOSIS — Z12.31 SCREENING MAMMOGRAM FOR BREAST CANCER: ICD-10-CM

## 2024-11-19 DIAGNOSIS — E11.65 TYPE 2 DIABETES MELLITUS WITH HYPERGLYCEMIA, WITHOUT LONG-TERM CURRENT USE OF INSULIN: ICD-10-CM

## 2024-11-19 DIAGNOSIS — H66.001 NON-RECURRENT ACUTE SUPPURATIVE OTITIS MEDIA OF RIGHT EAR WITHOUT SPONTANEOUS RUPTURE OF TYMPANIC MEMBRANE: Primary | ICD-10-CM

## 2024-11-19 LAB
LEFT EYE DM RETINOPATHY: NEGATIVE
RIGHT EYE DM RETINOPATHY: NEGATIVE

## 2024-11-19 PROCEDURE — 1159F MED LIST DOCD IN RCRD: CPT | Mod: ,,, | Performed by: NURSE PRACTITIONER

## 2024-11-19 PROCEDURE — 77063 BREAST TOMOSYNTHESIS BI: CPT | Mod: TC

## 2024-11-19 PROCEDURE — 3008F BODY MASS INDEX DOCD: CPT | Mod: ,,, | Performed by: NURSE PRACTITIONER

## 2024-11-19 PROCEDURE — 3074F SYST BP LT 130 MM HG: CPT | Mod: ,,, | Performed by: NURSE PRACTITIONER

## 2024-11-19 PROCEDURE — 3044F HG A1C LEVEL LT 7.0%: CPT | Mod: ,,, | Performed by: NURSE PRACTITIONER

## 2024-11-19 PROCEDURE — 99214 OFFICE O/P EST MOD 30 MIN: CPT | Mod: ,,, | Performed by: NURSE PRACTITIONER

## 2024-11-19 PROCEDURE — 1125F AMNT PAIN NOTED PAIN PRSNT: CPT | Mod: ,,, | Performed by: NURSE PRACTITIONER

## 2024-11-19 PROCEDURE — 3066F NEPHROPATHY DOC TX: CPT | Mod: ,,, | Performed by: NURSE PRACTITIONER

## 2024-11-19 PROCEDURE — 1160F RVW MEDS BY RX/DR IN RCRD: CPT | Mod: ,,, | Performed by: NURSE PRACTITIONER

## 2024-11-19 PROCEDURE — 3079F DIAST BP 80-89 MM HG: CPT | Mod: ,,, | Performed by: NURSE PRACTITIONER

## 2024-11-19 PROCEDURE — 3061F NEG MICROALBUMINURIA REV: CPT | Mod: ,,, | Performed by: NURSE PRACTITIONER

## 2024-11-19 RX ORDER — AMOXICILLIN AND CLAVULANATE POTASSIUM 875; 125 MG/1; MG/1
1 TABLET, FILM COATED ORAL 2 TIMES DAILY
Qty: 20 TABLET | Refills: 0 | Status: SHIPPED | OUTPATIENT
Start: 2024-11-19 | End: 2024-11-29

## 2024-11-19 RX ORDER — DULAGLUTIDE 0.75 MG/.5ML
0.75 INJECTION, SOLUTION SUBCUTANEOUS
Qty: 4 PEN | Refills: 2 | Status: SHIPPED | OUTPATIENT
Start: 2024-11-19 | End: 2025-11-19

## 2024-11-19 NOTE — PROGRESS NOTES
Patient ID: Magda Wahl  : 1960     Chief Complaint: Otalgia    Allergies: Patient is allergic to fluoxetine, codeine, and olanzapine.     History of Present Illness:  The patient is a 64 y.o. White female who presents to clinic for evaluation and management with a chief complaint of Otalgia   Could not afford ozempic, was changed to byetta but caused GI upset. Would like to try another once weekly injection and felt better on those.     Otalgia   There is pain in both (right worse than left) ears. The current episode started in the past 7 days. The problem occurs constantly. The problem has been gradually worsening. There has been no fever. Pertinent negatives include no abdominal pain, coughing, diarrhea, ear discharge, headaches, hearing loss, neck pain, rash, rhinorrhea, sore throat or vomiting. She has tried NSAIDs for the symptoms. The treatment provided moderate relief.   Diabetes  She presents for her follow-up diabetic visit. She has type 2 diabetes mellitus. Her disease course has been improving. Pertinent negatives for hypoglycemia include no headaches. Pertinent negatives for diabetes include no blurred vision, no chest pain, no fatigue, no foot paresthesias, no foot ulcerations, no polydipsia, no polyphagia, no polyuria, no visual change, no weakness and no weight loss. Symptoms are improving. Pertinent negatives for diabetic complications include no CVA, peripheral neuropathy or retinopathy. Risk factors for coronary artery disease include diabetes mellitus, dyslipidemia, family history, obesity, tobacco exposure and post-menopausal. Current diabetic treatment includes oral agent (dual therapy).        Past Medical History:  has a past medical history of Bipolar disorder, Chronic venous insufficiency, Degenerative disc disease, lumbar, Depressive disorder, History of psychiatric hospitalization, psychiatric care, Hypothyroidism, Mild chronic obstructive pulmonary disease, Mitral valve  insufficiency, Mixed hyperlipidemia, Obstructive sleep apnea syndrome, Psychiatric problem, Sleep difficulties, Tobacco user, Type 2 diabetes mellitus, and Vitamin D deficiency.    Social History:  reports that she has been smoking cigarettes. She started smoking about 49 years ago. She has a 24.9 pack-year smoking history. She has been exposed to tobacco smoke. She has never used smokeless tobacco. She reports that she does not currently use alcohol. She reports that she does not use drugs.    Care Team: Patient Care Team:  Omar Barboza APRN as PCP - General (Family Medicine)  Manas Medrano MD as Consulting Physician (Cardiology)  Buddy Franklin OD as Consulting Physician (Optometry)  Hi Jarvis PMHNP as Nurse Practitioner (Psychiatry)     Current Medications:  Current Outpatient Medications   Medication Instructions    amitriptyline (ELAVIL) 10 mg, Oral, Nightly    amoxicillin-clavulanate 875-125mg (AUGMENTIN) 875-125 mg per tablet 1 tablet, Oral, 2 times daily    cholecalciferol (vitamin D3) (VITAMIN D3) 4,000 Units, Daily    clopidogreL (PLAVIX) 75 mg, Oral, Daily    famotidine (PEPCID) 20 mg, Daily    levothyroxine (SYNTHROID) 88 mcg, Oral, Every morning    montelukast (SINGULAIR) 10 mg, Nightly    multivitamin (ONE DAILY MULTIVITAMIN) per tablet 1 tablet, Daily    rosuvastatin (CRESTOR) 40 mg, Oral, Daily    TRULICITY 0.75 mg, Subcutaneous, Every 7 days    venlafaxine (EFFEXOR-XR) 150 mg, Oral, Every morning, Take with 75mg cap for total daily dose of 225mg.    venlafaxine (EFFEXOR-XR) 75 mg, Oral, Daily, Take with 150mg cap for total daily dose of 225mg.       Review of Systems   Constitutional:  Negative for fatigue and weight loss.   HENT:  Positive for ear pain. Negative for ear discharge, hearing loss, rhinorrhea and sore throat.    Eyes:  Negative for blurred vision.   Respiratory:  Negative for cough.    Cardiovascular:  Negative for chest pain.   Gastrointestinal:   "Negative for abdominal pain, diarrhea and vomiting.   Endocrine: Negative for polydipsia, polyphagia and polyuria.   Musculoskeletal:  Negative for neck pain.   Integumentary:  Negative for rash.   Neurological:  Negative for weakness and headaches.        Visit Vitals  /82 (BP Location: Right arm)   Pulse 77   Temp 97.5 °F (36.4 °C) (Temporal)   Ht 5' 2.99" (1.6 m)   Wt 69.4 kg (153 lb)   SpO2 98%   BMI 27.11 kg/m²       Physical Exam  Vitals reviewed.   Constitutional:       General: She is not in acute distress.     Appearance: Normal appearance. She is overweight.   HENT:      Head: Normocephalic and atraumatic.      Right Ear: Ear canal and external ear normal. A middle ear effusion is present. Tympanic membrane is injected and bulging.      Left Ear: Ear canal and external ear normal. A middle ear effusion is present.      Nose: Rhinorrhea present. No congestion. Rhinorrhea is clear.      Mouth/Throat:      Mouth: Mucous membranes are moist.      Pharynx: Oropharynx is clear. No oropharyngeal exudate or posterior oropharyngeal erythema.   Eyes:      Conjunctiva/sclera: Conjunctivae normal.   Cardiovascular:      Rate and Rhythm: Normal rate and regular rhythm.      Pulses: Normal pulses.      Heart sounds: Murmur (2/6) heard.   Pulmonary:      Effort: Pulmonary effort is normal.      Breath sounds: Normal breath sounds.   Musculoskeletal:         General: No swelling, tenderness or deformity. Normal range of motion.   Lymphadenopathy:      Cervical: Cervical adenopathy present.   Skin:     General: Skin is warm and dry.      Coloration: Skin is not jaundiced.      Findings: No rash.   Neurological:      Mental Status: She is alert and oriented to person, place, and time.      Cranial Nerves: No cranial nerve deficit.   Psychiatric:         Mood and Affect: Mood normal.         Behavior: Behavior normal.          Labs Reviewed:  Chemistry:  Lab Results   Component Value Date     08/13/2024    K 3.6 " 08/13/2024    BUN 5 (L) 08/13/2024    CREATININE 0.64 (L) 08/13/2024    EGFRNORACEVR >90 08/13/2024    GLUCOSE 214 (H) 08/13/2024    CALCIUM 9.3 08/13/2024    ALKPHOS 90 08/13/2024    LABPROT 7.4 08/13/2024    ALBUMIN 4.4 08/13/2024    AST 33 08/13/2024    ALT 31 08/13/2024    [id number] 39 08/01/2024    TSH 5.060 (H) 08/13/2024        Lab Results   Component Value Date    HGBA1C 6.9 08/15/2024        Hematology:  Lab Results   Component Value Date    WBC 8.11 08/13/2024    RBC 4.76 08/13/2024    HGB 14.3 08/13/2024    HCT 42.0 08/13/2024    MCV 88.2 08/13/2024    MCH 30.0 08/13/2024    MCHC 34.0 08/13/2024    RDW 12.3 08/13/2024     08/13/2024    MPV 10.3 08/13/2024       Lipid Panel:  Lab Results   Component Value Date    CHOL 150 08/15/2024    HDL 54 08/15/2024    LDLDIRECT 98.9 08/01/2024    TRIG 70 08/15/2024    TOTALCHOLEST 3 08/15/2024        Assessment & Plan:  1. Non-recurrent acute suppurative otitis media of right ear without spontaneous rupture of tympanic membrane  Comments:  complete abx as prescribed  Orders:  -     amoxicillin-clavulanate 875-125mg (AUGMENTIN) 875-125 mg per tablet; Take 1 tablet by mouth 2 (two) times daily. for 10 days  Dispense: 20 tablet; Refill: 0    2. Type 2 diabetes mellitus with hyperglycemia, without long-term current use of insulin  Overview:  Established with Dr. Franklin for vision.  Metformin caused GI upset. Could not afford ozempic. Byetta causing GI upset. Obtain PA for trulicity. If unable to afford, consider jardiance or farxiga.     Orders:  -     dulaglutide (TRULICITY) 0.75 mg/0.5 mL pen injector; Inject 0.75 mg into the skin every 7 days.  Dispense: 4 pen ; Refill: 2    3. Screening mammogram for breast cancer  -     Mammo Digital Screening Bilat w/ Medhat; Future; Expected date: 11/19/2024         Future Appointments   Date Time Provider Department Center   11/19/2024  3:15 PM OA MAMMO1 OA MAMMO [facility]   12/5/2024  1:00 PM Matheus  Hi BOUDREAUX PMHNP [facility]   12/18/2024  1:00 PM [facility]   1/31/2025  9:00 AM LAB, [facility] LABORATORY DRAW STATION [facility]   2/7/2025 10:30 AM [facility] [place]   7/31/2025  8:20 AM LAB, [facility] LABORATORY DRAW STATION [facility]   8/7/2025 11:00 AM Omar Barboza APRN [facility] FRANCISCOYOSVANY [facility]       Follow up for 1mo f/u, DM, 15 min. Call sooner if needed.    SOLIS SANCHEZ        Lab Frequency Next Occurrence   Mammo Digital Screening Bilat w/ Medhat Once 07/03/2023   CT Chest Lung Screening Low Dose Once 08/06/2024   DXA Bone Density Axial Skeleton 1 or more sites Once 08/06/2024   Ambulatory referral/consult to Cardiology Once 08/13/2024   Comprehensive Metabolic Panel Once 02/06/2025   Lipid Panel Once 02/06/2025   TSH Once 02/06/2025   Hemoglobin A1C Once 02/06/2025   CBC Auto Differential Once 02/06/2025   CBC Auto Differential Once 08/06/2025   Comprehensive Metabolic Panel Once 08/06/2025   Lipid Panel Once 08/06/2025   TSH Once 08/06/2025   Hemoglobin A1C Once 08/06/2025   Microalbumin/Creatinine Ratio, Urine Once 08/06/2025   Vitamin D Once 08/06/2025

## 2024-11-22 ENCOUNTER — PATIENT OUTREACH (OUTPATIENT)
Facility: CLINIC | Age: 64
End: 2024-11-22
Payer: MEDICARE

## 2024-11-22 NOTE — PROGRESS NOTES
Health Maintenance Topic(s) Outreach Outcomes & Actions Taken:    Eye Exam - Outreach Outcomes & Actions Taken  : Diabetic Eye External Records Uploaded, Care Team & History Updated if Applicable       Additional Notes:  Upload Diabetic Eye Exam: 11/19/2024

## 2024-11-25 ENCOUNTER — PATIENT MESSAGE (OUTPATIENT)
Dept: FAMILY MEDICINE | Facility: CLINIC | Age: 64
End: 2024-11-25
Payer: MEDICARE

## 2024-12-09 DIAGNOSIS — F33.2 SEVERE EPISODE OF RECURRENT MAJOR DEPRESSIVE DISORDER, WITHOUT PSYCHOTIC FEATURES: ICD-10-CM

## 2024-12-09 RX ORDER — MIRTAZAPINE 15 MG/1
15 TABLET, FILM COATED ORAL NIGHTLY
Qty: 30 TABLET | Refills: 1 | OUTPATIENT
Start: 2024-12-09

## 2025-02-01 DIAGNOSIS — F33.2 SEVERE EPISODE OF RECURRENT MAJOR DEPRESSIVE DISORDER, WITHOUT PSYCHOTIC FEATURES: ICD-10-CM

## 2025-02-03 RX ORDER — VENLAFAXINE HYDROCHLORIDE 75 MG/1
75 CAPSULE, EXTENDED RELEASE ORAL DAILY
Qty: 30 CAPSULE | Refills: 0 | Status: SHIPPED | OUTPATIENT
Start: 2025-02-03 | End: 2025-02-11 | Stop reason: SDUPTHER

## 2025-02-06 PROBLEM — E11.51 TYPE 2 DIABETES MELLITUS WITH DIABETIC PERIPHERAL ANGIOPATHY WITHOUT GANGRENE, WITHOUT LONG-TERM CURRENT USE OF INSULIN: Status: ACTIVE | Noted: 2025-02-06

## 2025-02-07 ENCOUNTER — OFFICE VISIT (OUTPATIENT)
Dept: FAMILY MEDICINE | Facility: CLINIC | Age: 65
End: 2025-02-07
Payer: MEDICARE

## 2025-02-07 VITALS
BODY MASS INDEX: 25.69 KG/M2 | OXYGEN SATURATION: 99 % | TEMPERATURE: 97 F | SYSTOLIC BLOOD PRESSURE: 110 MMHG | HEIGHT: 63 IN | DIASTOLIC BLOOD PRESSURE: 72 MMHG | HEART RATE: 80 BPM | WEIGHT: 145 LBS
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
## 2025-02-07 DIAGNOSIS — M25.532 LEFT WRIST PAIN: Primary | ICD-10-CM

## 2025-02-07 PROCEDURE — 3008F BODY MASS INDEX DOCD: CPT | Mod: ,,, | Performed by: NURSE PRACTITIONER

## 2025-02-07 PROCEDURE — 1160F RVW MEDS BY RX/DR IN RCRD: CPT | Mod: ,,, | Performed by: NURSE PRACTITIONER

## 2025-02-07 PROCEDURE — 84443 ASSAY THYROID STIM HORMONE: CPT | Performed by: NURSE PRACTITIONER

## 2025-02-07 PROCEDURE — 1125F AMNT PAIN NOTED PAIN PRSNT: CPT | Mod: ,,, | Performed by: NURSE PRACTITIONER

## 2025-02-07 PROCEDURE — 85025 COMPLETE CBC W/AUTO DIFF WBC: CPT | Performed by: NURSE PRACTITIONER

## 2025-02-07 PROCEDURE — 83036 HEMOGLOBIN GLYCOSYLATED A1C: CPT | Performed by: NURSE PRACTITIONER

## 2025-02-07 PROCEDURE — 80061 LIPID PANEL: CPT | Performed by: NURSE PRACTITIONER

## 2025-02-07 PROCEDURE — 99214 OFFICE O/P EST MOD 30 MIN: CPT | Mod: ,,, | Performed by: NURSE PRACTITIONER

## 2025-02-07 PROCEDURE — 3074F SYST BP LT 130 MM HG: CPT | Mod: ,,, | Performed by: NURSE PRACTITIONER

## 2025-02-07 PROCEDURE — 80053 COMPREHEN METABOLIC PANEL: CPT | Performed by: NURSE PRACTITIONER

## 2025-02-07 PROCEDURE — 3078F DIAST BP <80 MM HG: CPT | Mod: ,,, | Performed by: NURSE PRACTITIONER

## 2025-02-07 PROCEDURE — 1159F MED LIST DOCD IN RCRD: CPT | Mod: ,,, | Performed by: NURSE PRACTITIONER

## 2025-02-07 RX ORDER — NAPROXEN 500 MG/1
500 TABLET ORAL 2 TIMES DAILY WITH MEALS
Qty: 30 TABLET | Refills: 0 | Status: SHIPPED | OUTPATIENT
Start: 2025-02-07

## 2025-02-07 NOTE — ASSESSMENT & PLAN NOTE
Educated on need to avoid taking more than one NSAID at a time. May take 2 extra strength Tylenol TID as needed for pain. Educated that muscle relaxants can cause drowsiness. Patient states understanding. Will notify of xray results when available. Handouts for stretching exercises provided.

## 2025-02-07 NOTE — PROGRESS NOTES
Patient ID: Magda Wahl  : 1960    Chief Complaint: Wrist Pain    Allergies: Patient is allergic to fluoxetine, codeine, and olanzapine.     Subjective :  The patient is a 64 y.o. White female who presents to clinic for follow up on Wrist Pain   HPI   History of Present Illness    HPI:  Patient reports left wrist pain that started about a month ago after falling while moving. She was crossing over the tongue of a trailer when her foot got caught, causing her to fall on her knees. It is unclear if the patient's wrist directly impacted an object during the fall, but the wrist pain began immediately after the incident. Patient denies any wrist discomfort prior to the fall.    Patient describes difficulty with various activities due to the wrist pain, including inability to lift objects and significant limitations in daily tasks. The pain is aggravated by certain movements. Patient reports pain throughout the wrist area upon manipulation.    Patient has been self-medicating with OTC ibuprofen, taking 2 tablets once daily. Last night, the patient took 4 ibuprofen tablets before bed, which helped with sleep. Patient continues to have significant discomfort and functional limitations.    The wrist pain has persisted for approximately 1 month, during which time the patient has continued to engage in moving activities, potentially exacerbating the condition.    MEDICATIONS:  Patient is on Ibuprofen, taking 2-4 tablets daily orally for wrist pain.    IMAGING:  No imaging results are available for this patient.    ROS:  ROS as indicated in HPI.           Social History:  reports that she has been smoking cigarettes. She started smoking about 50 years ago. She has a 25.1 pack-year smoking history. She has been exposed to tobacco smoke. She has never used smokeless tobacco. She reports that she does not currently use alcohol. She reports that she does not use drugs.    Past Medical History:  has a past medical  "history of Bipolar disorder, Chronic venous insufficiency, Degenerative disc disease, lumbar, Depressive disorder, History of psychiatric hospitalization, psychiatric care, Hypothyroidism, Mild chronic obstructive pulmonary disease, Mitral valve insufficiency, Mixed hyperlipidemia, Obstructive sleep apnea syndrome, Psychiatric problem, Sleep difficulties, Tobacco user, Type 2 diabetes mellitus, and Vitamin D deficiency.    Care Team: Patient Care Team:  Omar Barboza APRN as PCP - General (Family Medicine)  Manas Medrano MD as Consulting Physician (Cardiology)  Buddy Franklin OD as Consulting Physician (Optometry)  Hi Jarvis PMHNP as Nurse Practitioner (Psychiatry)     Current Medications:  Current Outpatient Medications   Medication Instructions    amitriptyline (ELAVIL) 10 mg, Oral, Nightly    cholecalciferol (vitamin D3) (VITAMIN D3) 4,000 Units, Daily    clopidogreL (PLAVIX) 75 mg, Oral, Daily    famotidine (PEPCID) 20 mg, Daily    levothyroxine (SYNTHROID) 88 mcg, Oral, Every morning    montelukast (SINGULAIR) 10 mg, Nightly    multivitamin (ONE DAILY MULTIVITAMIN) per tablet 1 tablet, Daily    naproxen (NAPROSYN) 500 mg, Oral, 2 times daily with meals    rosuvastatin (CRESTOR) 40 mg, Oral, Daily    TRULICITY 0.75 mg, Subcutaneous, Every 7 days    venlafaxine (EFFEXOR-XR) 75 mg, Oral, Daily, Take with 150mg cap for total daily dose of 225mg.         Visit Vitals  /72 (BP Location: Left arm)   Pulse 80   Temp 96.8 °F (36 °C) (Temporal)   Ht 5' 2.99" (1.6 m)   Wt 65.8 kg (145 lb)   SpO2 99%   BMI 25.69 kg/m²       Physical Exam  Vitals reviewed.   Constitutional:       General: She is not in acute distress.     Appearance: Normal appearance. She is overweight.   HENT:      Nose: Nose normal.      Mouth/Throat:      Mouth: Mucous membranes are moist.      Pharynx: Oropharynx is clear.   Eyes:      Conjunctiva/sclera: Conjunctivae normal.   Cardiovascular:      Rate and Rhythm: " Normal rate and regular rhythm.      Pulses: Normal pulses.      Heart sounds: Murmur (2/6) heard.   Pulmonary:      Effort: Pulmonary effort is normal.      Breath sounds: Normal breath sounds.   Abdominal:      General: Bowel sounds are normal.      Palpations: Abdomen is soft.      Tenderness: There is no abdominal tenderness.   Musculoskeletal:         General: No swelling or deformity.      Left wrist: Swelling and tenderness (+ Finklestien test) present. No effusion, lacerations, bony tenderness, snuff box tenderness or crepitus. Decreased range of motion. Normal pulse.   Skin:     General: Skin is warm and dry.      Coloration: Skin is not jaundiced.      Findings: No rash.   Neurological:      Mental Status: She is alert.   Psychiatric:         Mood and Affect: Mood normal.         Behavior: Behavior normal.          Labs Reviewed:  Chemistry:  Lab Results   Component Value Date     02/07/2025    K 4.4 02/07/2025    BUN 15 02/07/2025    CREATININE 0.79 02/07/2025    EGFRNORACEVR 83 02/07/2025    GLUCOSE 175 (H) 02/07/2025    CALCIUM 9.5 02/07/2025    ALKPHOS 85 02/07/2025    LABPROT 6.7 02/07/2025    ALBUMIN 4.3 02/07/2025    AST 26 02/07/2025    ALT 18 02/07/2025    [id number] 39 08/01/2024    TSH 5.060 (H) 08/13/2024        Lab Results   Component Value Date    HGBA1C 6.9 08/15/2024        Hematology:  Lab Results   Component Value Date    WBC 5.58 02/07/2025    RBC 4.84 02/07/2025    HGB 14.4 02/07/2025    HCT 42.3 02/07/2025    MCV 87.4 02/07/2025    MCH 29.8 02/07/2025    MCHC 34.0 02/07/2025    RDW 12.3 02/07/2025     02/07/2025    MPV 11.1 02/07/2025       Lipid Panel:  Lab Results   Component Value Date    CHOL 150 08/15/2024    HDL 54 08/15/2024    LDLDIRECT 98.9 08/01/2024    TRIG 70 08/15/2024    TOTALCHOLEST 3 08/15/2024        Diagnosis:  1. Left wrist pain  Overview:  will notify of xray results when avialable    Assessment & Plan:  Educated on need to avoid taking more than one  NSAID at a time. May take 2 extra strength Tylenol TID as needed for pain. Educated that muscle relaxants can cause drowsiness. Patient states understanding. Will notify of xray results when available. Handouts for stretching exercises provided.    Orders:  -     X-Ray Wrist 2 View Left; Future; Expected date: 02/07/2025  -     naproxen (NAPROSYN) 500 MG tablet; Take 1 tablet (500 mg total) by mouth 2 (two) times daily with meals.  Dispense: 30 tablet; Refill: 0    2. Fall in home, initial encounter         Assessment & Plan    M25.532 Left wrist pain  W19.XXXA, Y92.009 Fall in home, initial encounter    IMPRESSION:  - Suspected de Quervain's tenosynovitis based on physical exam findings and patient's description of pain  - Ordered x-ray to rule out fracture, considering the fall history and persistent pain after 1 month  - Recommend conservative management with anti-inflammatory medication, stretching exercises, and splinting  - Considered potential for orthopedic referral for injection if symptoms do not improve with initial treatment    M25.532 LEFT WRIST PAIN:  - Explained the complex anatomy of the wrist, including multiple small bones and tendons.  - Discussed de Quervain's tenosynovitis as a potential cause of the patient's wrist tendonitis.  - Educated the patient on the importance of consistent anti-inflammatory use to reduce inflammation over time.  - Instructed the patient to use a wrist splint for support during the healing process.  - Prescribed naproxen 500 mg to be taken twice daily with food for 7-10 days, with extra doses provided.  - Discontinued ibuprofen.  - Advised to continue OTC acetaminophen as needed for additional pain relief, up to 2 extra strength tablets 3 times daily.  - Ordered an x-ray of the left wrist.  - Instructed the patient to contact the office for x-ray results by end of day.  - Scheduled a follow-up visit on 2/11 at 11:40 AM, coinciding with the patient's appointment with  Carlos.         Future Appointments   Date Time Provider Department Center   2/11/2025  9:30 AM [facility]   2/11/2025 11:40 AM Hi Jarvis, PMHNP [facility]   7/31/2025  8:20 AM LAB, [facility] LABORATORY DRAW STATION [facility]ASHLEIGH [facility]   8/7/2025 11:00 AM Omar Barboza APRN [facility]       Follow up for 1) print pt education 2) move 2/14 apt to 2/11 since she will already be here to see Carlos. Call sooner if needed.    SOLIS SANCHEZ    Lab Frequency Next Occurrence   Mammo Digital Screening Bilat w/ Medhat Once 07/03/2023   CT Chest Lung Screening Low Dose Once 08/06/2024   DXA Bone Density Axial Skeleton 1 or more sites Once 08/06/2024   Ambulatory referral/consult to Cardiology Once 08/13/2024   CBC Auto Differential Once 08/06/2025   Comprehensive Metabolic Panel Once 08/06/2025   Lipid Panel Once 08/06/2025   TSH Once 08/06/2025   Hemoglobin A1C Once 08/06/2025   Microalbumin/Creatinine Ratio, Urine Once 08/06/2025   Vitamin D Once 08/06/2025            This note was generated with the assistance of ambient listening technology. Verbal consent was obtained by the patient and accompanying visitor(s) for the recording of patient appointment to facilitate this note. I attest to having reviewed and edited the generated note for accuracy, though some syntax or spelling errors may persist. Please contact the author of this note for any clarification.

## 2025-02-11 ENCOUNTER — OFFICE VISIT (OUTPATIENT)
Dept: BEHAVIORAL HEALTH | Facility: CLINIC | Age: 65
End: 2025-02-11
Payer: MEDICARE

## 2025-02-11 ENCOUNTER — OFFICE VISIT (OUTPATIENT)
Dept: FAMILY MEDICINE | Facility: CLINIC | Age: 65
End: 2025-02-11
Payer: MEDICARE

## 2025-02-11 VITALS
WEIGHT: 144.38 LBS | HEART RATE: 74 BPM | DIASTOLIC BLOOD PRESSURE: 74 MMHG | SYSTOLIC BLOOD PRESSURE: 128 MMHG | HEIGHT: 63 IN | BODY MASS INDEX: 25.58 KG/M2 | TEMPERATURE: 98 F | OXYGEN SATURATION: 100 %

## 2025-02-11 DIAGNOSIS — Z87.891 PERSONAL HISTORY OF NICOTINE DEPENDENCE: ICD-10-CM

## 2025-02-11 DIAGNOSIS — E11.51 TYPE 2 DIABETES MELLITUS WITH DIABETIC PERIPHERAL ANGIOPATHY WITHOUT GANGRENE, WITHOUT LONG-TERM CURRENT USE OF INSULIN: Primary | ICD-10-CM

## 2025-02-11 DIAGNOSIS — E03.9 ACQUIRED HYPOTHYROIDISM: ICD-10-CM

## 2025-02-11 DIAGNOSIS — Z12.2 SCREENING FOR LUNG CANCER: ICD-10-CM

## 2025-02-11 DIAGNOSIS — I73.9 PAD (PERIPHERAL ARTERY DISEASE): ICD-10-CM

## 2025-02-11 DIAGNOSIS — F33.2 SEVERE EPISODE OF RECURRENT MAJOR DEPRESSIVE DISORDER, WITHOUT PSYCHOTIC FEATURES: Primary | ICD-10-CM

## 2025-02-11 DIAGNOSIS — Z72.0 TOBACCO USER: ICD-10-CM

## 2025-02-11 DIAGNOSIS — E78.49 OTHER HYPERLIPIDEMIA: ICD-10-CM

## 2025-02-11 DIAGNOSIS — J44.9 MILD CHRONIC OBSTRUCTIVE PULMONARY DISEASE: ICD-10-CM

## 2025-02-11 DIAGNOSIS — F43.21 GRIEF: ICD-10-CM

## 2025-02-11 DIAGNOSIS — F41.1 GENERALIZED ANXIETY DISORDER: ICD-10-CM

## 2025-02-11 DIAGNOSIS — N18.2 CKD STAGE 2 DUE TO TYPE 2 DIABETES MELLITUS: ICD-10-CM

## 2025-02-11 DIAGNOSIS — Z78.0 POST-MENOPAUSAL: ICD-10-CM

## 2025-02-11 DIAGNOSIS — I34.0 NONRHEUMATIC MITRAL VALVE REGURGITATION: ICD-10-CM

## 2025-02-11 DIAGNOSIS — E11.22 CKD STAGE 2 DUE TO TYPE 2 DIABETES MELLITUS: ICD-10-CM

## 2025-02-11 DIAGNOSIS — G47.33 OBSTRUCTIVE SLEEP APNEA SYNDROME: ICD-10-CM

## 2025-02-11 PROCEDURE — 1159F MED LIST DOCD IN RCRD: CPT | Mod: ,,, | Performed by: NURSE PRACTITIONER

## 2025-02-11 PROCEDURE — 3008F BODY MASS INDEX DOCD: CPT | Mod: ,,, | Performed by: NURSE PRACTITIONER

## 2025-02-11 PROCEDURE — 3074F SYST BP LT 130 MM HG: CPT | Mod: ,,, | Performed by: NURSE PRACTITIONER

## 2025-02-11 PROCEDURE — 3051F HG A1C>EQUAL 7.0%<8.0%: CPT | Mod: ,,, | Performed by: NURSE PRACTITIONER

## 2025-02-11 PROCEDURE — 1160F RVW MEDS BY RX/DR IN RCRD: CPT | Mod: ,,, | Performed by: NURSE PRACTITIONER

## 2025-02-11 PROCEDURE — 3078F DIAST BP <80 MM HG: CPT | Mod: ,,, | Performed by: NURSE PRACTITIONER

## 2025-02-11 PROCEDURE — 1125F AMNT PAIN NOTED PAIN PRSNT: CPT | Mod: ,,, | Performed by: NURSE PRACTITIONER

## 2025-02-11 PROCEDURE — 99214 OFFICE O/P EST MOD 30 MIN: CPT | Mod: ,,, | Performed by: NURSE PRACTITIONER

## 2025-02-11 RX ORDER — VENLAFAXINE HYDROCHLORIDE 75 MG/1
75 CAPSULE, EXTENDED RELEASE ORAL DAILY
Qty: 30 CAPSULE | Refills: 2 | Status: SHIPPED | OUTPATIENT
Start: 2025-02-11

## 2025-02-11 RX ORDER — ROSUVASTATIN CALCIUM 40 MG/1
40 TABLET, COATED ORAL DAILY
Qty: 90 TABLET | Refills: 3 | Status: SHIPPED | OUTPATIENT
Start: 2025-02-11

## 2025-02-11 RX ORDER — EZETIMIBE 10 MG/1
10 TABLET ORAL DAILY
Qty: 90 TABLET | Refills: 3 | Status: SHIPPED | OUTPATIENT
Start: 2025-02-11 | End: 2026-02-11

## 2025-02-11 RX ORDER — ALBUTEROL SULFATE 90 UG/1
2 INHALANT RESPIRATORY (INHALATION) EVERY 6 HOURS PRN
Qty: 18 G | Refills: 3 | Status: SHIPPED | OUTPATIENT
Start: 2025-02-11

## 2025-02-11 RX ORDER — AMITRIPTYLINE HYDROCHLORIDE 10 MG/1
10 TABLET, FILM COATED ORAL NIGHTLY
Qty: 30 TABLET | Refills: 2 | Status: SHIPPED | OUTPATIENT
Start: 2025-02-11

## 2025-02-11 RX ORDER — LEVOTHYROXINE SODIUM 88 UG/1
88 TABLET ORAL EVERY MORNING
Qty: 90 TABLET | Refills: 3 | Status: SHIPPED | OUTPATIENT
Start: 2025-02-11

## 2025-02-11 RX ORDER — BEMPEDOIC ACID 180 MG/1
1 TABLET, FILM COATED ORAL DAILY
Qty: 90 TABLET | Refills: 3 | Status: SHIPPED | OUTPATIENT
Start: 2025-02-11

## 2025-02-11 RX ORDER — DAPAGLIFLOZIN 5 MG/1
5 TABLET, FILM COATED ORAL DAILY
Qty: 90 TABLET | Refills: 3 | Status: SHIPPED | OUTPATIENT
Start: 2025-02-11

## 2025-02-11 RX ORDER — VENLAFAXINE HYDROCHLORIDE 150 MG/1
150 CAPSULE, EXTENDED RELEASE ORAL DAILY
Qty: 30 CAPSULE | Refills: 2 | Status: SHIPPED | OUTPATIENT
Start: 2025-02-11

## 2025-02-11 NOTE — ASSESSMENT & PLAN NOTE
Lipid Panel:  Lab Results   Component Value Date    CHOL 271 (H) 02/07/2025    HDL 61 (H) 02/07/2025    LDLDIRECT 168.8 (H) 02/07/2025    TRIG 117 02/07/2025    TOTALCHOLEST 3 08/15/2024    AST 26 02/07/2025    ALT 18 02/07/2025    ALKPHOS 85 02/07/2025    LABPROT 6.7 02/07/2025    ALBUMIN 4.3 02/07/2025

## 2025-02-11 NOTE — PROGRESS NOTES
PSYCHIATRIC FOLLOW-UP VISIT NOTE    Chief Complaint   Patient presents with    Medication Management     Medication management         History of Present Illness  64 y.o. year old White female with hx of MDD, indu, grief, and tobacco use seen today for follow-up appointment and medication management.  Patient reports good efficacy with current medication regimen.  Describes depression is very minimal at present.  Denies any impact on her daily life from signs or symptoms of depression.  Her anxiety has been slightly higher, states she and her family are currently moving to a safe for home in [place].  She is optimistic about this move in his handling the stress of moving with minimal impact on her ability to function.  She is sleeping well with current medication regimen.  Denies any side effects at present.  Overall she is without any acute complaints today. Patient denies SI/HI. Denies hallucinations and does not appear to be responding to internal stimuli or be internally preoccupied. No manic symptoms noted.       Objective:     Vitals:  There were no vitals filed for this visit.    Wt Readings from Last 3 Encounters:   02/11/25 0941 65.5 kg (144 lb 6.4 oz)   02/07/25 1013 65.8 kg (145 lb)   11/19/24 0947 69.4 kg (153 lb)         Medication:    Current Outpatient Medications:     albuterol (PROVENTIL HFA) 90 mcg/actuation inhaler, Inhale 2 puffs into the lungs every 6 (six) hours as needed for Wheezing. Rescue, Disp: 18 g, Rfl: 3    bempedoic acid (NEXLETOL) 180 mg Tab, Take 1 tablet (180 mg total) by mouth once daily., Disp: 90 tablet, Rfl: 3    cholecalciferol, vitamin D3, (VITAMIN D3) 50 mcg (2,000 unit) Cap capsule, Take 4,000 Units by mouth once daily., Disp: , Rfl:     clopidogreL (PLAVIX) 75 mg tablet, Take 1 tablet (75 mg total) by mouth once daily., Disp: 90 tablet, Rfl: 3    dapagliflozin propanediol (FARXIGA) 5 mg Tab tablet, Take 1 tablet (5 mg total) by mouth once daily., Disp: 90 tablet, Rfl:  3    ezetimibe (ZETIA) 10 mg tablet, Take 1 tablet (10 mg total) by mouth once daily., Disp: 90 tablet, Rfl: 3    famotidine (PEPCID) 20 MG tablet, Take 20 mg by mouth Daily., Disp: , Rfl:     levothyroxine (SYNTHROID) 88 MCG tablet, Take 1 tablet (88 mcg total) by mouth every morning., Disp: 90 tablet, Rfl: 3    montelukast (SINGULAIR) 10 mg tablet, Take 10 mg by mouth every evening., Disp: , Rfl:     multivitamin (ONE DAILY MULTIVITAMIN) per tablet, Take 1 tablet by mouth once daily., Disp: , Rfl:     naproxen (NAPROSYN) 500 MG tablet, Take 1 tablet (500 mg total) by mouth 2 (two) times daily with meals., Disp: 30 tablet, Rfl: 0    rosuvastatin (CRESTOR) 40 MG Tab, Take 1 tablet (40 mg total) by mouth Daily., Disp: 90 tablet, Rfl: 3    amitriptyline (ELAVIL) 10 MG tablet, Take 1 tablet (10 mg total) by mouth every evening., Disp: 30 tablet, Rfl: 2    venlafaxine (EFFEXOR-XR) 150 MG Cp24, Take 1 capsule (150 mg total) by mouth once daily. Take with 75mg cap for total daily dose of 225mg., Disp: 30 capsule, Rfl: 2    venlafaxine (EFFEXOR-XR) 75 MG 24 hr capsule, Take 1 capsule (75 mg total) by mouth once daily. Take with 150mg cap for total daily dose of 225mg., Disp: 30 capsule, Rfl: 2       Significant Labs: - none at this time    Significant Imaging: - none at this time    Physical Exam  Vitals and nursing note reviewed.   Constitutional:       General: She is awake.      Appearance: Normal appearance.   Musculoskeletal:      Comments: Full ROM   Neurological:      Mental Status: She is alert.   Psychiatric:         Attention and Perception: Attention and perception normal. She does not perceive auditory or visual hallucinations.         Mood and Affect: Affect normal.         Speech: Speech normal.         Behavior: Behavior is cooperative.         Thought Content: Thought content does not include homicidal or suicidal ideation.         Cognition and Memory: Cognition and memory normal.          Review of  "Systems     Mental Status Exam:  Presentation:  - Appearance: 64 y.o. year old White female, appears stated age, appears Casually dressed and Well groomed  - Motility: Erect when standing, Steady gait, No EPS or Tremors, No psychomotor agitation or retardation appreciated  - Behavior: calm, cooperative, good eye contact  Speech:  - Character/Organization: spontaneous, fluent, normal volume, normal rate, normal rhythm  Emotional State:  - Mood: "happier"   - Affect: congruent and appropriate  Thought:  - Process: logical, linear, organized , goal-directed  - Preoccupations: no ruminations, rituals, or phobias appreciated  - Delusions: no persecutory, paranoid, or grandiose delusions appreciated  - Perception: denies AVH, not actively responding to internal stimuli  - SI/HI: denies/denies  Sensorium & Intellect:  - Sensorium: AAOx4  - Memory: intact to recent and remote events  - Attention/Concentration: good/good  - Insight/Judgement: good/good    Gait: normal swing and stance  MSK:no rigidity appreciated    All other systems without acute issues unless noted in HPI      Assessment/Plan      ICD-10-CM ICD-9-CM    1. Severe episode of recurrent major depressive disorder, without psychotic features  F33.2 296.33 amitriptyline (ELAVIL) 10 MG tablet      venlafaxine (EFFEXOR-XR) 75 MG 24 hr capsule      2. Generalized anxiety disorder  F41.1 300.02 amitriptyline (ELAVIL) 10 MG tablet      3. Grief  F43.21 309.0       4. Tobacco user  Z72.0 305.1          Continue current medications without change    Potential side effects and risks vs benefits of current treatment plan reviewed with patient. Applicable black box warnings reviewed. Encouraged patient not to alter dosages or abruptly discontinue medications without contacting prescriber first, due to risk of worsening symptoms and decompensation of mental status. Warned of risks associated with herbal remedies and supplements while taking psychotropic medications and of " the need to consult prescriber prior to adding any of these to current regimen. Patient should abstain from abuse of alcohol, prescription medications, and illicit drugs. Reviewed when to contact clinic and/or seek emergent care, such as but not limited to, onset/worsening SI/HI, hallucinations, delusions, manic symptoms. Pt verbalized understanding and agreement of these warnings/recommendations and verbally consented to treatment plan.      Follow up in about 3 months (around 5/11/2025) for Medication Management.        Hi Jarvis, TANYAP

## 2025-02-11 NOTE — PROGRESS NOTES
Patient ID: Magda Wahl  : 1960     Chief Complaint: Diabetes (6 month follow up )    Allergies: Patient is allergic to fluoxetine, codeine, and olanzapine.     History of Present Illness:  The patient is a 64 y.o. White female who presents to clinic for evaluation and management with a chief complaint of Diabetes (6 month follow up )   Diabetes  She presents for her follow-up diabetic visit. She has type 2 diabetes mellitus. Her disease course has been improving. Pertinent negatives for diabetes include no blurred vision, no chest pain, no fatigue, no foot paresthesias, no foot ulcerations, no polydipsia, no polyphagia, no polyuria, no visual change, no weakness and no weight loss. Symptoms are improving. Pertinent negatives for diabetic complications include no CVA, peripheral neuropathy or retinopathy. Risk factors for coronary artery disease include diabetes mellitus, dyslipidemia, family history, obesity, tobacco exposure and post-menopausal. Current diabetic treatment includes oral agent (dual therapy).   Hyperlipidemia  This is a chronic problem. The problem is uncontrolled. Exacerbating diseases include diabetes and hypothyroidism. Pertinent negatives include no chest pain. Current antihyperlipidemic treatment includes statins. The current treatment provides mild improvement of lipids. Risk factors for coronary artery disease include family history, post-menopausal and diabetes mellitus.        Past Medical History:  has a past medical history of Bipolar disorder, Chronic venous insufficiency, Degenerative disc disease, lumbar, Depressive disorder, History of psychiatric hospitalization, psychiatric care, Hypothyroidism, Mild chronic obstructive pulmonary disease, Mitral valve insufficiency, Mixed hyperlipidemia, Obstructive sleep apnea syndrome, Psychiatric problem, Sleep difficulties, Tobacco user, Type 2 diabetes mellitus, and Vitamin D deficiency.    Social History:  reports that she has been  "smoking cigarettes. She started smoking about 50 years ago. She has a 25.1 pack-year smoking history. She has been exposed to tobacco smoke. She has never used smokeless tobacco. She reports that she does not currently use alcohol. She reports that she does not use drugs.    Care Team: Patient Care Team:  Omar Barboza APRN as PCP - General (Family Medicine)  Manas Medrano MD as Consulting Physician (Cardiology)  Buddy Franklin OD as Consulting Physician (Optometry)  Hi Jarvis PMHNP as Nurse Practitioner (Psychiatry)     Current Medications:  Current Outpatient Medications   Medication Instructions    albuterol (PROVENTIL HFA) 90 mcg/actuation inhaler 2 puffs, Inhalation, Every 6 hours PRN, Rescue    amitriptyline (ELAVIL) 10 mg, Oral, Nightly    cholecalciferol (vitamin D3) (VITAMIN D3) 4,000 Units, Daily    clopidogreL (PLAVIX) 75 mg, Oral, Daily    dapagliflozin propanediol (FARXIGA) 5 mg, Oral, Daily    ezetimibe (ZETIA) 10 mg, Oral, Daily    famotidine (PEPCID) 20 mg, Daily    levothyroxine (SYNTHROID) 88 mcg, Oral, Every morning    montelukast (SINGULAIR) 10 mg, Nightly    multivitamin (ONE DAILY MULTIVITAMIN) per tablet 1 tablet, Daily    naproxen (NAPROSYN) 500 mg, Oral, 2 times daily with meals    NEXLETOL 180 mg, Oral, Daily    rosuvastatin (CRESTOR) 40 mg, Oral, Daily    venlafaxine (EFFEXOR-XR) 75 mg, Oral, Daily, Take with 150mg cap for total daily dose of 225mg.       Review of Systems   Constitutional:  Negative for fatigue and weight loss.   Eyes:  Negative for blurred vision.   Cardiovascular:  Negative for chest pain.   Endocrine: Negative for polydipsia, polyphagia and polyuria.   Neurological:  Negative for weakness.        Visit Vitals  /74 (BP Location: Right arm, Patient Position: Sitting)   Pulse 74   Temp 97.5 °F (36.4 °C) (Temporal)   Ht 5' 2.99" (1.6 m)   Wt 65.5 kg (144 lb 6.4 oz)   SpO2 100%   BMI 25.59 kg/m²       Physical Exam  Vitals " reviewed.   Constitutional:       General: She is not in acute distress.     Appearance: Normal appearance. She is overweight.   HENT:      Head: Normocephalic and atraumatic.      Nose: Nose normal. No congestion.      Mouth/Throat:      Mouth: Mucous membranes are moist.      Pharynx: Oropharynx is clear. No oropharyngeal exudate or posterior oropharyngeal erythema.   Eyes:      Conjunctiva/sclera: Conjunctivae normal.   Cardiovascular:      Rate and Rhythm: Normal rate and regular rhythm.      Pulses: Normal pulses.      Heart sounds: Murmur (2/6) heard.   Pulmonary:      Effort: Pulmonary effort is normal.      Breath sounds: Normal breath sounds.   Musculoskeletal:      Cervical back: Neck supple.   Lymphadenopathy:      Cervical: No cervical adenopathy.   Skin:     General: Skin is warm and dry.      Coloration: Skin is not jaundiced.      Findings: No rash.   Neurological:      Mental Status: She is alert and oriented to person, place, and time. Mental status is at baseline.   Psychiatric:         Mood and Affect: Mood normal.         Behavior: Behavior normal.        Labs Reviewed:  Chemistry:  Lab Results   Component Value Date     02/07/2025    K 4.4 02/07/2025    BUN 15 02/07/2025    CREATININE 0.79 02/07/2025    EGFRNORACEVR 83 02/07/2025    GLUCOSE 175 (H) 02/07/2025    CALCIUM 9.5 02/07/2025    ALKPHOS 85 02/07/2025    LABPROT 6.7 02/07/2025    ALBUMIN 4.3 02/07/2025    AST 26 02/07/2025    ALT 18 02/07/2025    JTZPAHIQ40GL 39 08/01/2024    TSH 2.190 02/07/2025        Lab Results   Component Value Date    HGBA1C 7.1 (H) 02/07/2025        Hematology:  Lab Results   Component Value Date    WBC 5.58 02/07/2025    RBC 4.84 02/07/2025    HGB 14.4 02/07/2025    HCT 42.3 02/07/2025    MCV 87.4 02/07/2025    MCH 29.8 02/07/2025    MCHC 34.0 02/07/2025    RDW 12.3 02/07/2025     02/07/2025    MPV 11.1 02/07/2025       Lipid Panel:  Lab Results   Component Value Date    CHOL 271 (H) 02/07/2025     HDL 61 (H) 02/07/2025    LDLDIRECT 168.8 (H) 02/07/2025    TRIG 117 02/07/2025    TOTALCHOLEST 3 08/15/2024        Assessment & Plan:  1. Type 2 diabetes mellitus with diabetic peripheral angiopathy without gangrene, without long-term current use of insulin  Overview:  Established with Dr. Franklin for vision.  Metformin caused GI upset. Could not afford ozempic. Byetta caused GI upset. Begin farxiga.     Assessment & Plan:  Diabetes labs:   Lab Results   Component Value Date    HGBA1C 7.1 (H) 02/07/2025        Orders:  -     dapagliflozin propanediol (FARXIGA) 5 mg Tab tablet; Take 1 tablet (5 mg total) by mouth once daily.  Dispense: 90 tablet; Refill: 3    2. Mild chronic obstructive pulmonary disease  Overview:  2018 pft showed mild restriction. Patient declines updated PFT , encouraged smoking cessation. Continue prn albuterol. Patient agrees to maintenance medication.     Orders:  -     albuterol (PROVENTIL HFA) 90 mcg/actuation inhaler; Inhale 2 puffs into the lungs every 6 (six) hours as needed for Wheezing. Rescue  Dispense: 18 g; Refill: 3    3. Tobacco user  Overview:  Down to 1/2 ppd. Declines medication and referral to smoking cessation program     Orders:  -     CT Chest Lung Screening Low Dose; Future; Expected date: 02/11/2025    4. Obstructive sleep apnea syndrome  Overview:  Improved with CPAP      5. Other hyperlipidemia  Overview:  prescribed rosuvastatin 40mg, zetia, begin nexletol.     Assessment & Plan:  Lipid Panel:  Lab Results   Component Value Date    CHOL 271 (H) 02/07/2025    HDL 61 (H) 02/07/2025    LDLDIRECT 168.8 (H) 02/07/2025    TRIG 117 02/07/2025    TOTALCHOLEST 3 08/15/2024    AST 26 02/07/2025    ALT 18 02/07/2025    ALKPHOS 85 02/07/2025    LABPROT 6.7 02/07/2025    ALBUMIN 4.3 02/07/2025          Orders:  -     ezetimibe (ZETIA) 10 mg tablet; Take 1 tablet (10 mg total) by mouth once daily.  Dispense: 90 tablet; Refill: 3  -     rosuvastatin (CRESTOR) 40 MG Tab; Take 1 tablet  (40 mg total) by mouth Daily.  Dispense: 90 tablet; Refill: 3    6. PAD (peripheral artery disease)  Overview:  Has bilateral stents. Consulted cardiovascular in [place] to alleviate transportation issues. Continue plavix, statin, zetia. Begin nexletol.    Orders:  -     bempedoic acid (NEXLETOL) 180 mg Tab; Take 1 tablet (180 mg total) by mouth once daily.  Dispense: 90 tablet; Refill: 3    7. CKD stage 2 due to type 2 diabetes mellitus  Overview:  Add farxiga      8. Acquired hypothyroidism  Overview:  Stable with 88mcg levothyroxine.     Assessment & Plan:  Lab Results   Component Value Date    TSH 2.190 02/07/2025       Orders:  -     levothyroxine (SYNTHROID) 88 MCG tablet; Take 1 tablet (88 mcg total) by mouth every morning.  Dispense: 90 tablet; Refill: 3    9. Post-menopausal  -     DXA Bone Density Axial Skeleton 1 or more sites; Future; Expected date: 02/11/2025    10. Screening for lung cancer    11. Personal history of nicotine dependence  -     CT Chest Lung Screening Low Dose; Future; Expected date: 02/11/2025         Future Appointments   Date Time Provider Department Center   2/11/2025 11:40 AM Hi Jarvis, PMHNP [facility]   7/31/2025  8:20 AM LAB, [facility] LABORATORY DRAW STATION [facility]   8/7/2025 11:00 AM Omar Barboza APRN [facility]       Follow up for Keep Apt as Scheduled. Call sooner if needed.    SOLIS SANCHEZ        Lab Frequency Next Occurrence   Mammo Digital Screening Bilat w/ Medhat Once 07/03/2023   CT Chest Lung Screening Low Dose Once 08/06/2024   DXA Bone Density Axial Skeleton 1 or more sites Once 08/06/2024   Ambulatory referral/consult to Cardiology Once 08/13/2024   CBC Auto Differential Once 08/06/2025   Comprehensive Metabolic Panel Once 08/06/2025   Lipid Panel Once 08/06/2025   TSH Once 08/06/2025   Hemoglobin A1C Once 08/06/2025   Microalbumin/Creatinine Ratio, Urine Once 08/06/2025   Vitamin D Once 08/06/2025

## 2025-04-24 ENCOUNTER — OFFICE VISIT (OUTPATIENT)
Dept: FAMILY MEDICINE | Facility: CLINIC | Age: 65
End: 2025-04-24
Payer: MEDICARE

## 2025-04-24 VITALS
BODY MASS INDEX: 25.94 KG/M2 | DIASTOLIC BLOOD PRESSURE: 60 MMHG | HEART RATE: 83 BPM | OXYGEN SATURATION: 97 % | WEIGHT: 146.38 LBS | TEMPERATURE: 99 F | SYSTOLIC BLOOD PRESSURE: 136 MMHG | HEIGHT: 63 IN

## 2025-04-24 DIAGNOSIS — R01.1 HEART MURMUR: ICD-10-CM

## 2025-04-24 DIAGNOSIS — I34.0 NONRHEUMATIC MITRAL VALVE REGURGITATION: Primary | ICD-10-CM

## 2025-04-24 DIAGNOSIS — M25.532 LEFT WRIST PAIN: ICD-10-CM

## 2025-04-24 DIAGNOSIS — F33.2 SEVERE EPISODE OF RECURRENT MAJOR DEPRESSIVE DISORDER, WITHOUT PSYCHOTIC FEATURES: ICD-10-CM

## 2025-04-24 DIAGNOSIS — I73.9 PAD (PERIPHERAL ARTERY DISEASE): ICD-10-CM

## 2025-04-24 DIAGNOSIS — E78.49 OTHER HYPERLIPIDEMIA: ICD-10-CM

## 2025-04-24 PROCEDURE — 3075F SYST BP GE 130 - 139MM HG: CPT | Mod: ,,, | Performed by: NURSE PRACTITIONER

## 2025-04-24 PROCEDURE — 1160F RVW MEDS BY RX/DR IN RCRD: CPT | Mod: ,,, | Performed by: NURSE PRACTITIONER

## 2025-04-24 PROCEDURE — 1101F PT FALLS ASSESS-DOCD LE1/YR: CPT | Mod: ,,, | Performed by: NURSE PRACTITIONER

## 2025-04-24 PROCEDURE — 3008F BODY MASS INDEX DOCD: CPT | Mod: ,,, | Performed by: NURSE PRACTITIONER

## 2025-04-24 PROCEDURE — 99214 OFFICE O/P EST MOD 30 MIN: CPT | Mod: ,,, | Performed by: NURSE PRACTITIONER

## 2025-04-24 PROCEDURE — 3288F FALL RISK ASSESSMENT DOCD: CPT | Mod: ,,, | Performed by: NURSE PRACTITIONER

## 2025-04-24 PROCEDURE — 3078F DIAST BP <80 MM HG: CPT | Mod: ,,, | Performed by: NURSE PRACTITIONER

## 2025-04-24 PROCEDURE — 1125F AMNT PAIN NOTED PAIN PRSNT: CPT | Mod: ,,, | Performed by: NURSE PRACTITIONER

## 2025-04-24 PROCEDURE — 3051F HG A1C>EQUAL 7.0%<8.0%: CPT | Mod: ,,, | Performed by: NURSE PRACTITIONER

## 2025-04-24 PROCEDURE — 1159F MED LIST DOCD IN RCRD: CPT | Mod: ,,, | Performed by: NURSE PRACTITIONER

## 2025-04-24 RX ORDER — VENLAFAXINE HYDROCHLORIDE 150 MG/1
150 CAPSULE, EXTENDED RELEASE ORAL DAILY
Qty: 30 CAPSULE | Refills: 2 | Status: SHIPPED | OUTPATIENT
Start: 2025-04-24

## 2025-04-24 RX ORDER — VENLAFAXINE HYDROCHLORIDE 75 MG/1
75 CAPSULE, EXTENDED RELEASE ORAL DAILY
Qty: 30 CAPSULE | Refills: 2 | Status: SHIPPED | OUTPATIENT
Start: 2025-04-24

## 2025-04-24 NOTE — PROGRESS NOTES
Patient ID: Magda Wahl  : 1960     Chief Complaint: cardio referral (Wrist pain)    Allergies: Patient is allergic to fluoxetine, codeine, and olanzapine.     History of Present Illness:  The patient is a 64 y.o. White female who presents to clinic for evaluation and management with a chief complaint of cardio referral (Wrist pain)   Patient is seeking new referral to cardiology d/t previous cardiologist out of network with her insurance. Denies any current chest pain, sob, palpitations. Patient requesting to see cardiologist that her  will see.     Patient also c/o ongoing left wrist pain. Began after a fall in 2025 when she was moving. Sought treatment a month later. Xrays negative. Has been taking nsaids and bracing for past 2 months, still having pain with palpation and movement.          Past Medical History:  has a past medical history of Bipolar disorder, Chronic venous insufficiency, Degenerative disc disease, lumbar, Depressive disorder, History of psychiatric hospitalization, psychiatric care, Hypothyroidism, Mild chronic obstructive pulmonary disease, Mitral valve insufficiency, Mixed hyperlipidemia, Obstructive sleep apnea syndrome, Psychiatric problem, Sleep difficulties, Tobacco user, Type 2 diabetes mellitus, and Vitamin D deficiency.    Social History:  reports that she has been smoking cigarettes. She started smoking about 50 years ago. She has a 25.2 pack-year smoking history. She has been exposed to tobacco smoke. She has never used smokeless tobacco. She reports that she does not currently use alcohol. She reports that she does not use drugs.    Care Team: Patient Care Team:  Omar Barboza APRN as PCP - General (Family Medicine)  Manas Medrano MD as Consulting Physician (Cardiology)  Buddy Franklin OD as Consulting Physician (Optometry)  Hi Jarvis PMHNP as Nurse Practitioner (Psychiatry)     Current Medications:  Current Outpatient Medications    Medication Instructions    albuterol (PROVENTIL HFA) 90 mcg/actuation inhaler 2 puffs, Inhalation, Every 6 hours PRN, Rescue    amitriptyline (ELAVIL) 10 mg, Oral, Nightly    cholecalciferol (vitamin D3) (VITAMIN D3) 4,000 Units, Daily    clopidogreL (PLAVIX) 75 mg, Oral, Daily    dapagliflozin propanediol (FARXIGA) 5 mg, Oral, Daily    ezetimibe (ZETIA) 10 mg, Oral, Daily    famotidine (PEPCID) 20 mg, Daily    levothyroxine (SYNTHROID) 88 mcg, Oral, Every morning    montelukast (SINGULAIR) 10 mg, Nightly    multivitamin (ONE DAILY MULTIVITAMIN) per tablet 1 tablet, Daily    naproxen (NAPROSYN) 500 mg, Oral, 2 times daily with meals    NEXLETOL 180 mg, Oral, Daily    rosuvastatin (CRESTOR) 40 mg, Oral, Daily    venlafaxine (EFFEXOR-XR) 150 mg, Oral, Daily, Take with 75mg cap for total daily dose of 225mg.    venlafaxine (EFFEXOR-XR) 75 mg, Oral, Daily, Take with 150mg cap for total daily dose of 225mg.       Review of Systems   Constitutional:  Negative for appetite change, fatigue, fever and unexpected weight change.   HENT:  Negative for nasal congestion, ear pain, facial swelling, hearing loss, mouth sores, nosebleeds, sore throat and trouble swallowing.    Eyes:  Negative for pain, discharge, redness and visual disturbance.   Respiratory:  Negative for cough, chest tightness and shortness of breath.    Cardiovascular:  Negative for chest pain, palpitations and leg swelling.   Gastrointestinal:  Negative for abdominal pain, blood in stool, constipation, diarrhea and nausea.   Endocrine: Negative for cold intolerance, heat intolerance, polydipsia, polyphagia and polyuria.   Genitourinary:  Negative for difficulty urinating, dysuria, frequency and hematuria.   Musculoskeletal:  Positive for arthralgias and myalgias. Negative for joint swelling and joint deformity.   Integumentary:  Negative for color change, rash and mole/lesion.   Neurological:  Negative for dizziness, weakness, headaches and memory loss.  "  Hematological:  Negative for adenopathy. Does not bruise/bleed easily.   Psychiatric/Behavioral:  Negative for confusion, sleep disturbance and suicidal ideas.         Visit Vitals  /60 (BP Location: Right arm, Patient Position: Sitting)   Pulse 83   Temp 98.5 °F (36.9 °C) (Oral)   Ht 5' 2.99" (1.6 m)   Wt 66.4 kg (146 lb 6.4 oz)   SpO2 97%   BMI 25.94 kg/m²       Physical Exam  Vitals reviewed.   Constitutional:       General: She is not in acute distress.     Appearance: Normal appearance. She is overweight.   HENT:      Nose: Nose normal.      Mouth/Throat:      Mouth: Mucous membranes are moist.      Pharynx: Oropharynx is clear.   Eyes:      Conjunctiva/sclera: Conjunctivae normal.   Cardiovascular:      Rate and Rhythm: Normal rate and regular rhythm.      Pulses: Normal pulses.      Heart sounds: Murmur (2/6) heard.   Pulmonary:      Effort: Pulmonary effort is normal.      Breath sounds: Normal breath sounds.   Abdominal:      General: Bowel sounds are normal.      Palpations: Abdomen is soft.      Tenderness: There is no abdominal tenderness.   Musculoskeletal:         General: No swelling or deformity.      Left wrist: Swelling, tenderness (with palpation of distal radius with edema) and bony tenderness present. No effusion, lacerations, snuff box tenderness or crepitus. Decreased range of motion. Normal pulse.   Skin:     General: Skin is warm and dry.      Coloration: Skin is not jaundiced.      Findings: No rash.   Neurological:      Mental Status: She is alert.   Psychiatric:         Mood and Affect: Mood normal.         Behavior: Behavior normal.          Labs Reviewed:  Chemistry:  Lab Results   Component Value Date     02/07/2025    K 4.4 02/07/2025    BUN 15 02/07/2025    CREATININE 0.79 02/07/2025    EGFRNORACEVR 83 02/07/2025    GLUCOSE 175 (H) 02/07/2025    CALCIUM 9.5 02/07/2025    ALKPHOS 85 02/07/2025    LABPROT 6.7 02/07/2025    ALBUMIN 4.3 02/07/2025    AST 26 02/07/2025    " ALT 18 02/07/2025    SXDWQHXH76OF 39 08/01/2024    TSH 2.190 02/07/2025        Lab Results   Component Value Date    HGBA1C 7.1 (H) 02/07/2025        Hematology:  Lab Results   Component Value Date    WBC 5.58 02/07/2025    RBC 4.84 02/07/2025    HGB 14.4 02/07/2025    HCT 42.3 02/07/2025    MCV 87.4 02/07/2025    MCH 29.8 02/07/2025    MCHC 34.0 02/07/2025    RDW 12.3 02/07/2025     02/07/2025    MPV 11.1 02/07/2025       Lipid Panel:  Lab Results   Component Value Date    CHOL 271 (H) 02/07/2025    HDL 61 (H) 02/07/2025    LDLDIRECT 168.8 (H) 02/07/2025    TRIG 117 02/07/2025    TOTALCHOLEST 3 08/15/2024        Assessment & Plan:  1. Nonrheumatic mitral valve regurgitation  Overview:  Taking plavix, no longer seeing Dr. Medrano d/t out of network with insurance.  Refer to cardiology at [facility] per patient request.     Orders:  -     Ambulatory referral/consult to Cardiology; Future; Expected date: 05/01/2025    2. Heart murmur  Overview:  Consult to re-establish care with [facility] in [place]    Orders:  -     Ambulatory referral/consult to Cardiology; Future; Expected date: 05/01/2025    3. PAD (peripheral artery disease)  Overview:  Has bilateral stents. Consulted cardiovascular in [place] to alleviate transportation issues. Continue plavix, statin, zetia and nexletol.    Orders:  -     Ambulatory referral/consult to Cardiology; Future; Expected date: 05/01/2025    4. Other hyperlipidemia  Overview:  taking rosuvastatin 40mg, zetia, and nexletol.       5. Severe episode of recurrent major depressive disorder, without psychotic features  Assessment & Plan:  Established with Carlos, refill effexor 225mg     Orders:  -     venlafaxine (EFFEXOR-XR) 150 MG Cp24; Take 1 capsule (150 mg total) by mouth once daily. Take with 75mg cap for total daily dose of 225mg.  Dispense: 30 capsule; Refill: 2  -     venlafaxine (EFFEXOR-XR) 75 MG 24 hr capsule; Take 1 capsule (75 mg total) by mouth once  daily. Take with 150mg cap for total daily dose of 225mg.  Dispense: 30 capsule; Refill: 2    6. Left wrist pain  Overview:   Feb 2025 Xray negative. Ongoing pain despite NSAIDs, bracing, home exercises. Obtain MRI, Refer to orthopedic     Orders:  -     Ambulatory referral/consult to Orthopedics; Future; Expected date: 05/01/2025  -     MRI Wrist Joint Without Contrast Left; Future; Expected date: 04/24/2025         Future Appointments   Date Time Provider Department Center   5/14/2025  1:20 PM Hi Jarvis, PMHNP [facility]   7/31/2025  8:20 AM LAB, [facility]   8/7/2025 11:00 AM Omar Barboza APRN [facility]       No follow-ups on file. Call sooner if needed.    SOLIS SANCHEZ        Lab Frequency Next Occurrence   Mammo Digital Screening Bilat w/ Medhat Once 07/03/2023   Ambulatory referral/consult to Cardiology Once 08/13/2024   CBC Auto Differential Once 08/06/2025   Comprehensive Metabolic Panel Once 08/06/2025   Lipid Panel Once 08/06/2025   TSH Once 08/06/2025   Hemoglobin A1C Once 08/06/2025   Microalbumin/Creatinine Ratio, Urine Once 08/06/2025   Vitamin D Once 08/06/2025   DXA Bone Density Axial Skeleton 1 or more sites Once 02/11/2025   CT Chest Lung Screening Low Dose Once 02/11/2025

## 2025-05-14 ENCOUNTER — OFFICE VISIT (OUTPATIENT)
Dept: BEHAVIORAL HEALTH | Facility: CLINIC | Age: 65
End: 2025-05-14
Payer: MEDICARE

## 2025-05-14 VITALS
DIASTOLIC BLOOD PRESSURE: 84 MMHG | OXYGEN SATURATION: 98 % | WEIGHT: 143.94 LBS | BODY MASS INDEX: 25.5 KG/M2 | HEIGHT: 63 IN | TEMPERATURE: 100 F | SYSTOLIC BLOOD PRESSURE: 138 MMHG | HEART RATE: 91 BPM

## 2025-05-14 DIAGNOSIS — F33.2 SEVERE EPISODE OF RECURRENT MAJOR DEPRESSIVE DISORDER, WITHOUT PSYCHOTIC FEATURES: Primary | ICD-10-CM

## 2025-05-14 DIAGNOSIS — Z91.148 NON COMPLIANCE W MEDICATION REGIMEN: ICD-10-CM

## 2025-05-14 DIAGNOSIS — Z72.0 TOBACCO USER: ICD-10-CM

## 2025-05-14 DIAGNOSIS — F43.21 GRIEF: ICD-10-CM

## 2025-05-14 DIAGNOSIS — F41.1 GENERALIZED ANXIETY DISORDER: ICD-10-CM

## 2025-05-14 PROCEDURE — 99214 OFFICE O/P EST MOD 30 MIN: CPT | Mod: ,,, | Performed by: NURSE PRACTITIONER

## 2025-05-14 PROCEDURE — 3075F SYST BP GE 130 - 139MM HG: CPT | Mod: ,,, | Performed by: NURSE PRACTITIONER

## 2025-05-14 PROCEDURE — 3008F BODY MASS INDEX DOCD: CPT | Mod: ,,, | Performed by: NURSE PRACTITIONER

## 2025-05-14 PROCEDURE — 1125F AMNT PAIN NOTED PAIN PRSNT: CPT | Mod: ,,, | Performed by: NURSE PRACTITIONER

## 2025-05-14 PROCEDURE — 1159F MED LIST DOCD IN RCRD: CPT | Mod: ,,, | Performed by: NURSE PRACTITIONER

## 2025-05-14 PROCEDURE — 3079F DIAST BP 80-89 MM HG: CPT | Mod: ,,, | Performed by: NURSE PRACTITIONER

## 2025-05-14 PROCEDURE — 1160F RVW MEDS BY RX/DR IN RCRD: CPT | Mod: ,,, | Performed by: NURSE PRACTITIONER

## 2025-05-14 PROCEDURE — 3051F HG A1C>EQUAL 7.0%<8.0%: CPT | Mod: ,,, | Performed by: NURSE PRACTITIONER

## 2025-05-14 RX ORDER — VENLAFAXINE HYDROCHLORIDE 150 MG/1
150 CAPSULE, EXTENDED RELEASE ORAL DAILY
Qty: 30 CAPSULE | Refills: 2 | Status: SHIPPED | OUTPATIENT
Start: 2025-05-14

## 2025-05-14 RX ORDER — AMITRIPTYLINE HYDROCHLORIDE 10 MG/1
10 TABLET, FILM COATED ORAL NIGHTLY
Qty: 30 TABLET | Refills: 2 | Status: SHIPPED | OUTPATIENT
Start: 2025-05-14

## 2025-05-14 RX ORDER — VENLAFAXINE HYDROCHLORIDE 75 MG/1
75 CAPSULE, EXTENDED RELEASE ORAL DAILY
Qty: 30 CAPSULE | Refills: 2 | Status: SHIPPED | OUTPATIENT
Start: 2025-05-14
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
## 2025-06-23 ENCOUNTER — TELEPHONE (OUTPATIENT)
Dept: FAMILY MEDICINE | Facility: CLINIC | Age: 65
End: 2025-06-23
Payer: MEDICARE

## 2025-06-30 ENCOUNTER — OFFICE VISIT (OUTPATIENT)
Dept: BEHAVIORAL HEALTH | Facility: CLINIC | Age: 65
End: 2025-06-30
Payer: MEDICARE

## 2025-06-30 VITALS
BODY MASS INDEX: 26.52 KG/M2 | DIASTOLIC BLOOD PRESSURE: 72 MMHG | SYSTOLIC BLOOD PRESSURE: 106 MMHG | TEMPERATURE: 99 F | OXYGEN SATURATION: 98 % | HEART RATE: 67 BPM | WEIGHT: 149.69 LBS | HEIGHT: 63 IN

## 2025-06-30 DIAGNOSIS — F43.21 GRIEF: ICD-10-CM

## 2025-06-30 DIAGNOSIS — F33.2 SEVERE EPISODE OF RECURRENT MAJOR DEPRESSIVE DISORDER, WITHOUT PSYCHOTIC FEATURES: Primary | ICD-10-CM

## 2025-06-30 DIAGNOSIS — F41.1 GENERALIZED ANXIETY DISORDER: ICD-10-CM

## 2025-06-30 PROCEDURE — 3074F SYST BP LT 130 MM HG: CPT | Mod: ,,, | Performed by: NURSE PRACTITIONER

## 2025-06-30 PROCEDURE — 1125F AMNT PAIN NOTED PAIN PRSNT: CPT | Mod: ,,, | Performed by: NURSE PRACTITIONER

## 2025-06-30 PROCEDURE — 3051F HG A1C>EQUAL 7.0%<8.0%: CPT | Mod: ,,, | Performed by: NURSE PRACTITIONER

## 2025-06-30 PROCEDURE — 3008F BODY MASS INDEX DOCD: CPT | Mod: ,,, | Performed by: NURSE PRACTITIONER

## 2025-06-30 PROCEDURE — 99214 OFFICE O/P EST MOD 30 MIN: CPT | Mod: ,,, | Performed by: NURSE PRACTITIONER

## 2025-06-30 PROCEDURE — 1159F MED LIST DOCD IN RCRD: CPT | Mod: ,,, | Performed by: NURSE PRACTITIONER

## 2025-06-30 PROCEDURE — 3078F DIAST BP <80 MM HG: CPT | Mod: ,,, | Performed by: NURSE PRACTITIONER

## 2025-06-30 PROCEDURE — 1160F RVW MEDS BY RX/DR IN RCRD: CPT | Mod: ,,, | Performed by: NURSE PRACTITIONER

## 2025-06-30 RX ORDER — VENLAFAXINE HYDROCHLORIDE 150 MG/1
150 CAPSULE, EXTENDED RELEASE ORAL DAILY
Qty: 30 CAPSULE | Refills: 2 | Status: SHIPPED | OUTPATIENT
Start: 2025-06-30

## 2025-06-30 RX ORDER — VENLAFAXINE HYDROCHLORIDE 75 MG/1
75 CAPSULE, EXTENDED RELEASE ORAL DAILY
Qty: 30 CAPSULE | Refills: 2 | Status: SHIPPED | OUTPATIENT
Start: 2025-06-30

## 2025-06-30 RX ORDER — AMITRIPTYLINE HYDROCHLORIDE 10 MG/1
10 TABLET, FILM COATED ORAL NIGHTLY
Qty: 30 TABLET | Refills: 2 | Status: SHIPPED | OUTPATIENT
Start: 2025-06-30

## 2025-06-30 NOTE — PROGRESS NOTES
"PSYCHIATRIC FOLLOW-UP VISIT NOTE    Chief Complaint   Patient presents with    Medication Management     Medication management         History of Present Illness  64 y.o. year old White female with hx of MDD, indu, and grief seen today for follow-up appointment and medication management.  Patient reports that she has been doing very well since our last visit.  She is able to get her  admitted to a psychiatric facility, where he was diagnosed with dementia and placed on multiple medications.  She states he has been doing very well since he got home.  He no longer hallucinates or becomes aggressive.  She feels that things have returned to normal for the most part.  He does still have some memory deficits but these are less pronounced.  Patient has been doing much better since he returned home.  She denies any recent depression.  Anxiety has been mild and normative in nature.  Denies any symptoms that dominate her day or interfere with her ability to function.  She is sleeping well at night and feels rested in the morning.  Denies any side effects from current medication regimen. Patient denies SI/HI. Denies hallucinations and does not appear to be responding to internal stimuli or be internally preoccupied. No manic symptoms noted.       Objective:     Vitals:  Vitals:    06/30/25 1304   BP: 106/72   BP Location: Right arm   Patient Position: Sitting   Pulse: 67   Temp: 98.6 °F (37 °C)   TempSrc: Temporal   SpO2: 98%   Weight: 67.9 kg (149 lb 11.1 oz)   Height: 5' 2.99" (1.6 m)       Wt Readings from Last 3 Encounters:   06/30/25 1304 67.9 kg (149 lb 11.1 oz)   05/30/25 1411 64.3 kg (141 lb 12.1 oz)   05/14/25 1317 65.3 kg (143 lb 15.4 oz)         Medication:  Current Medications[1]       Significant Labs: - none at this time    Significant Imaging: - none at this time    Physical Exam     See HPI    Review of Systems     Mental Status Exam:  Presentation:  - Appearance: 64 y.o. year old White female, appears " "stated age, appears Casually dressed and Well groomed  - Motility: Erect when standing, Steady gait, No EPS or Tremors, No psychomotor agitation or retardation appreciated  - Behavior: calm, cooperative, good eye contact  Speech:  - Character/Organization: spontaneous, fluent, normal volume, normal rate, normal rhythm  Emotional State:  - Mood: "happier"   - Affect: congruent and appropriate  Thought:  - Process: logical, linear, organized , goal-directed  - Preoccupations: no ruminations, rituals, or phobias appreciated  - Delusions: no persecutory, paranoid, or grandiose delusions appreciated  - Perception: denies AVH, not actively responding to internal stimuli  - SI/HI: denies/denies  Sensorium & Intellect:  - Sensorium: AAOx4  - Memory: intact to recent and remote events  - Attention/Concentration: good/good  - Insight/Judgement: good/good    Gait: normal swing and stance  MSK:no rigidity appreciated    All other systems without acute issues unless noted in HPI      Assessment/Plan      ICD-10-CM ICD-9-CM    1. Severe episode of recurrent major depressive disorder, without psychotic features  F33.2 296.33 venlafaxine (EFFEXOR-XR) 150 MG Cp24      venlafaxine (EFFEXOR-XR) 75 MG 24 hr capsule      amitriptyline (ELAVIL) 10 MG tablet      2. Generalized anxiety disorder  F41.1 300.02 amitriptyline (ELAVIL) 10 MG tablet      3. Grief  F43.21 309.0          Continue current medications without change    Potential side effects and risks vs benefits of current treatment plan reviewed with patient. Applicable black box warnings reviewed. Encouraged patient not to alter dosages or abruptly discontinue medications without contacting prescriber first, due to risk of worsening symptoms and decompensation of mental status. Warned of risks associated with herbal remedies and supplements while taking psychotropic medications and of the need to consult prescriber prior to adding any of these to current regimen. Patient should " "abstain from abuse of alcohol, prescription medications, and illicit drugs. Reviewed when to contact clinic and/or seek emergent care, such as but not limited to, onset/worsening SI/HI, hallucinations, delusions, manic symptoms. Pt verbalized understanding and agreement of these warnings/recommendations and verbally consented to treatment plan.    Portions of this note may have been created with voice recognition software. Occasional "wrong-word" or "sound-a-like" substitutions may have occurred due to the inherent limitations of voice recognition software. Please, read the note carefully and recognize, using context, where substitutions have occurred.      Follow up in about 3 months (around 9/30/2025) for Medication Management.        Hi Jarvis, [facility]         [1]   Current Outpatient Medications:     albuterol (PROVENTIL HFA) 90 mcg/actuation inhaler, Inhale 2 puffs into the lungs every 6 (six) hours as needed for Wheezing. Rescue, Disp: 18 g, Rfl: 3    cholecalciferol, vitamin D3, (VITAMIN D3) 50 mcg (2,000 unit) Cap capsule, Take 4,000 Units by mouth once daily., Disp: , Rfl:     clopidogreL (PLAVIX) 75 mg tablet, Take 1 tablet (75 mg total) by mouth once daily., Disp: 90 tablet, Rfl: 3    ezetimibe (ZETIA) 10 mg tablet, Take 1 tablet (10 mg total) by mouth once daily., Disp: 90 tablet, Rfl: 3    famotidine (PEPCID) 20 MG tablet, Take 20 mg by mouth Daily., Disp: , Rfl:     levothyroxine (SYNTHROID) 88 MCG tablet, Take 1 tablet (88 mcg total) by mouth every morning., Disp: 90 tablet, Rfl: 3    montelukast (SINGULAIR) 10 mg tablet, Take 10 mg by mouth every evening., Disp: , Rfl:     multivitamin (ONE DAILY MULTIVITAMIN) per tablet, Take 1 tablet by mouth once daily., Disp: , Rfl:     rosuvastatin (CRESTOR) 40 MG Tab, Take 1 tablet (40 mg total) by mouth Daily., Disp: 90 tablet, Rfl: 3    amitriptyline (ELAVIL) 10 MG tablet, Take 1 tablet (10 mg total) by mouth every evening., Disp: 30 tablet, Rfl: " 2    venlafaxine (EFFEXOR-XR) 150 MG Cp24, Take 1 capsule (150 mg total) by mouth once daily. Take with 75mg cap for total daily dose of 225mg., Disp: 30 capsule, Rfl: 2    venlafaxine (EFFEXOR-XR) 75 MG 24 hr capsule, Take 1 capsule (75 mg total) by mouth once daily. Take with 150mg cap for total daily dose of 225mg., Disp: 30 capsule, Rfl: 2

## 2025-07-12 NOTE — PROGRESS NOTES
"Subjective:    Patient ID: Magda Wahl is a right handed 64 y.o. female  who presented to [facility] for new visit..      Chief Complaint: Pain of the Left Wrist      History of Present Illness:    Magda Wahl is a 64-year-old female who presents to clinic today for evaluation of left radial base of thumb pain that has been going on for the past few months.  Patient reports that she is right-handed but did fall on her right wrist about 6 months ago.  She denied any has a fracture but does report pain with any type of ulnar deviation of her left arm.  She has tried a thumb spica brace with mild improvement in her symptoms.  She rates her pain today as a 7/10 not improving with oral topical medications.  She has never had a injection into her hand or wrist before.  She denied any numbness tingling or dropping of objects at this time.      Prior to injection pain was a 7/10.  After injection pain with a 0/10.  Hand Review of Systems:  Swelling?  no  Instability?  no  Clicking?  no  Limited ROM? no  Fever/Chills? no  Subluxation? no  Dislocation? no  Numbness/Tingling? no  Weakness? no       Objective:      Physical Exam:    /66 (Patient Position: Sitting)   Pulse 78   Temp 98.8 °F (37.1 °C)   Ht 5' 3" (1.6 m)   Wt 67 kg (147 lb 11.3 oz)   SpO2 95%   BMI 26.17 kg/m²     Ortho/SPM Exam    Appearance:  Soft tissue swelling: Left: no Right: no  Effusion: Left:  Negative Right: Negative  Erythema: Left no Right: no  Ecchymosis: Left: no Right: no  Atrophy: Left: no Right: no    Palpation:  Hand/wrist Tenderness: Left: 1st dorsal compartment  Right: none    Range of motion:  Flexion (0-80): Left:  80 Right: 80  Extension (0-70): Left:  70 Right: 70  Ulnar deviation (0-30): 30 Right: 30  Radial deviation (0-20): 20 Right: 20  Supination (0-90): Left: 90 Right: 90  Pronation (0-90): Left: 90 Right: 90  Able to make a power fist and claw hand: on Both " hand(s)  Distal palmar crease-finger tip distance: 0 on Both hand(s)    Strength:  Flexion: Left: 5/5 Pain: no Right: 5/5 Pain: no  Extension: Left: 5/5 Pain: no Right: 5/5 Pain: no  Supination: Left: 5/5 Pain: no Right: 5/5 Pain: no  Pronation: Left: 5/5 Pain: no Right: 5/5 Pain: no  Ulnar deviation: Left: 5/5 Pain: no Right: 5/5 Pain: no  Radial deviation: Left: 5/5 Pain: no Right: 5/5 Pain: no    Special Tests:  Durkans Test (Carpal Compression test): Left: Negative  Right: Negative  FDP test: Left: Negative  Right: Negative  FDS test: Left: Negative  Right: Negative  Reverse Phalens: Left: Not performed Right: Not performed  Finkelstein's Test: Left: Positive Right: Negative    Froments: Left: Negative Right: Negative  Shuck Test: Left: Not performed Right: Not performed    AIN/PIN/Ulnar nerve: Intact and symmetric    Neurovascular Exam  General appearance: NAD  Peripheral pulses: normal bilaterally   Reflexes: Left: Not performed Right: Not performed   Sensation: normal  Median,Radial,Ulnar, Anterior Interosseus    Labs:  Last A1c: 7.1     Imaging:   Previous images reviewed.  X-rays ordered and performed today: yes  # of views: 3 Laterality: left  My Interpretation:  Distal Radial ulnar joint space is overall Normal on AP views. Scapholunate interval distance is Normal on left AP views. A DISI/VISI is not suggested on left hand series. Negative/positive ulnar variance is not suggested on lleft lateral views.  no fracture, dislocation, swelling or degenerative changes noted          Assessment:        Encounter Diagnoses   Code Name Primary?    M65.4 De Quervain's tenosynovitis, left Yes        Plan:   MDM: Prior external referring provider notes reviewed. Prior external referring provider studies reviewed.   Dx:  Left de Quervain tenosynovitis, new problem  Treatment Plan: Discussed with patient diagnosis and treatment recommendations. Handout given. Recommend conservative treatment to include: avoidance of  aggravating activity, significant modification of daily activities, hot/cold therapies, topical and oral medications, braces, HEP/PT/OT, and injections.   Patient with left been de Quervain tenosynovitis.  We will give her a corticosteroid injection into her tendon sheath at this time.  Counseled patient to wear her thumb spica brace for the next few days.  She can continue using oral and topical medication for pain relief.  After her injection, patient is still have some dorsal compartment pain.  We will see patient back in about 1 month for re-evaluation of her dorsal wrist.  Imaging: radiological studies ordered and independently reviewed; discussed with patient; agree with radiologist interpretation.   Procedure: Discussed CSI as treatment options; discussed injections as treatment options; since conservative measures did not improve symptoms patient consented for CSI today.  Activity: Activity as tolerated  Therapy: No formal therapy  Medication: CONTINUE over-the-counter acetaminophen (Tylenol 1000 mg three times per day as needed)  CONTINUE Voltaren Gel 1% as prescribed  CONTINUE over-the-counter NSAIDs (ibuprofen 200mg three tablets three times a day as needed). Please see your primary care physician for further refills.  RTC: 1 month follow up for left wrist.         Tendon Sheath    Date/Time: 7/14/2025 10:50 AM    Performed by: Leander Nogueira DO  Authorized by: Leander Nogueira, DO    Consent Done?:  Yes (Written)  Indications:  Pain  Site marked: the procedure site was marked    Timeout: prior to procedure the correct patient, procedure, and site was verified    Local anesthesia used?: Yes    Location:  Thumb  Site:  L thumb extension tendon sheath  Ultrasonic guidance for needle placement?: Yes    Needle size:  27 G  Approach:  Dorsal  Patient tolerance:  Patient tolerated the procedure well with no immediate complications    Staff Attending: Kamari Mims MD    Risks:  Possible complications with the  injection include bleeding, infection (.01%), tendon rupture, steroid flare, fat pad or soft tissue atrophy, skin depigmentation, allergic reaction to medications and vasovagal response. (steroid flare treatment is rest, ice, NSAIDs and resolves in 24-36 hours.)    Consent:  No absolute contraindications (cellulitis overlying joint, infection, lack of informed consent, allergy to injection medication, AVN protein or egg allergy for sodium hyaluronate, or history of steroid flare) or relative contraindications (uncontrolled DM2 A1c>10, coagulopathy, INR > 3.5, previous joint replacement or history of AVN).        Description:  The patient was prepped in normal sterile fashion use of chlorhexidine scrub and the appropriate and anatomic landmarks were identified with ultrasound as image guidance. The patient was evaluated with a WALTOP ultrasound machine using a 10 MHz linear probe, following a standard protocol. Ultrasound imaging confirmed placement of the needle in the correct position, with reference to surrounding anatomic structures. Dynamic visualization of the needle was continuous throughout the procedure(s) and maintained good position. Care was taken to ensure there was unrestricted flow of syringe contents (listed below) into the site of injection. The ultrasound image for needle placement was captured and saved in the patient's medical record.  .       Indication for use of Ultrasound Guidance: The indication for the use of ultrasound was to ensure accurate localization of needle for aspiration and/or injection, and minimize risk of damage to surrounding structures. Be EL, Clara S, Crow LJ, Iban MILLAN. Improving injection accuracy of the elbow, knee, and shoulder: does injection site and imaging make a difference? A systematic review. Am J Sports Med. 2011 Mar;39(3):656-62. doi: 10.1177/4470702683615680. Epub 2011 Jan 21. PMID: 31630694.    Body mass index is 26.17 kg/m².    Contents of syringe  included: 1/2 mL of 1% lidocaine with 4mg of Decadron/dexamethasone (1/2 mL of 4mg/mL)    Post Procedure: Patient alert, and moving all extremities. ROM improved, pain decreased.  Good peripheral pulses, no signs of vascular compromise and range of motion intact.  Aftercare instructions were given to patient at time of discharge.  Relative rest for 3 days-avoiding excess activity.  Place ice on the area for 15 minutes every 4-6 hours. Patient may take Tylenol a 1000 mg b.i.d. or ibuprofen 600 mg t.i.d. for the next 3-4 days if not on medication already and safe to take pending co-morbidities.  Protect the area for the next 1-8 hours if anesthetic was used.  Avoid excessive activity for the next 3-4 weeks.  ER precautions given for fever, severe joint pain or allergic reaction or other new symptoms related to the joint injection.         Leander Nogueira D.O.  Sports Medicine Fellow

## 2025-07-14 ENCOUNTER — OFFICE VISIT (OUTPATIENT)
Dept: ORTHOPEDICS | Facility: CLINIC | Age: 65
End: 2025-07-14
Payer: MEDICARE

## 2025-07-14 ENCOUNTER — HOSPITAL ENCOUNTER (OUTPATIENT)
Dept: RADIOLOGY | Facility: HOSPITAL | Age: 65
Discharge: HOME | End: 2025-07-14
Attending: STUDENT IN AN ORGANIZED HEALTH CARE EDUCATION/TRAINING PROGRAM
Payer: MEDICARE

## 2025-07-14 VITALS
BODY MASS INDEX: 26.17 KG/M2 | SYSTOLIC BLOOD PRESSURE: 126 MMHG | WEIGHT: 147.69 LBS | OXYGEN SATURATION: 95 % | HEART RATE: 78 BPM | TEMPERATURE: 99 F | HEIGHT: 63 IN | DIASTOLIC BLOOD PRESSURE: 66 MMHG

## 2025-07-14 DIAGNOSIS — M25.532 LEFT WRIST PAIN: ICD-10-CM

## 2025-07-14 DIAGNOSIS — M65.4 DE QUERVAIN'S TENOSYNOVITIS, LEFT: Primary | ICD-10-CM

## 2025-07-14 PROCEDURE — 99215 OFFICE O/P EST HI 40 MIN: CPT | Mod: PBBFAC,25 | Performed by: STUDENT IN AN ORGANIZED HEALTH CARE EDUCATION/TRAINING PROGRAM

## 2025-07-14 PROCEDURE — 73110 X-RAY EXAM OF WRIST: CPT | Mod: TC,LT

## 2025-07-14 PROCEDURE — 20550 NJX 1 TENDON SHEATH/LIGAMENT: CPT | Mod: PBBFAC,LT | Performed by: STUDENT IN AN ORGANIZED HEALTH CARE EDUCATION/TRAINING PROGRAM

## 2025-07-14 RX ORDER — DEXAMETHASONE SODIUM PHOSPHATE 4 MG/ML
4 INJECTION, SOLUTION INTRA-ARTICULAR; INTRALESIONAL; INTRAMUSCULAR; INTRAVENOUS; SOFT TISSUE
Status: COMPLETED | OUTPATIENT
Start: 2025-07-14 | End: 2025-07-14

## 2025-07-14 RX ORDER — LIDOCAINE HYDROCHLORIDE 10 MG/ML
1 INJECTION, SOLUTION EPIDURAL; INFILTRATION; INTRACAUDAL; PERINEURAL
Status: COMPLETED | OUTPATIENT
Start: 2025-07-14 | End: 2025-07-14

## 2025-07-14 RX ADMIN — LIDOCAINE HYDROCHLORIDE 10 MG: 10 INJECTION, SOLUTION EPIDURAL; INFILTRATION; INTRACAUDAL; PERINEURAL at 11:07

## 2025-07-14 RX ADMIN — DEXAMETHASONE SODIUM PHOSPHATE 4 MG: 4 INJECTION, SOLUTION INTRA-ARTICULAR; INTRALESIONAL; INTRAMUSCULAR; INTRAVENOUS; SOFT TISSUE at 11:07

## 2025-07-15 NOTE — PROGRESS NOTES
Faculty Attestation: Magda Wahl  was seen in [facility] Clinic Patient seen and evaluated at the time of the visit. History of Present Illness, Physical Exam, and Assessment and Plan reviewed.     Treatment plan is reasonable and appropriate. Compliance with treatment recommendations is important.      Radiology images independently reviewed and agree with fellow interpretation.     Procedure note reviewed. Present for entire procedure with the fellow. Patient tolerated procedure well.     Kamari Mims MD  Sports Medicine

## 2025-09-02 PROCEDURE — 80061 LIPID PANEL: CPT | Performed by: NURSE PRACTITIONER

## 2025-09-02 PROCEDURE — 82570 ASSAY OF URINE CREATININE: CPT | Performed by: NURSE PRACTITIONER

## 2025-09-02 PROCEDURE — 85025 COMPLETE CBC W/AUTO DIFF WBC: CPT | Performed by: NURSE PRACTITIONER

## 2025-09-02 PROCEDURE — 82306 VITAMIN D 25 HYDROXY: CPT | Performed by: NURSE PRACTITIONER

## 2025-09-02 PROCEDURE — 80053 COMPREHEN METABOLIC PANEL: CPT | Performed by: NURSE PRACTITIONER

## 2025-09-02 PROCEDURE — 84443 ASSAY THYROID STIM HORMONE: CPT | Performed by: NURSE PRACTITIONER

## 2025-09-02 PROCEDURE — 83036 HEMOGLOBIN GLYCOSYLATED A1C: CPT | Performed by: NURSE PRACTITIONER
